# Patient Record
Sex: FEMALE | Race: BLACK OR AFRICAN AMERICAN | Employment: OTHER | URBAN - METROPOLITAN AREA
[De-identification: names, ages, dates, MRNs, and addresses within clinical notes are randomized per-mention and may not be internally consistent; named-entity substitution may affect disease eponyms.]

---

## 2017-01-06 ENCOUNTER — OFFICE VISIT (OUTPATIENT)
Dept: INTERNAL MEDICINE CLINIC | Facility: CLINIC | Age: 62
End: 2017-01-06

## 2017-01-06 VITALS
SYSTOLIC BLOOD PRESSURE: 122 MMHG | TEMPERATURE: 98 F | HEART RATE: 84 BPM | HEIGHT: 57 IN | WEIGHT: 115.5 LBS | OXYGEN SATURATION: 99 % | DIASTOLIC BLOOD PRESSURE: 70 MMHG | BODY MASS INDEX: 24.92 KG/M2 | RESPIRATION RATE: 16 BRPM

## 2017-01-06 DIAGNOSIS — M47.26 OSTEOARTHRITIS OF SPINE WITH RADICULOPATHY, LUMBAR REGION: ICD-10-CM

## 2017-01-06 DIAGNOSIS — M51.9 LUMBAR DISC LESION: ICD-10-CM

## 2017-01-06 DIAGNOSIS — G89.29 OTHER CHRONIC PAIN: ICD-10-CM

## 2017-01-06 DIAGNOSIS — N95.2 VAGINAL ATROPHY: Primary | ICD-10-CM

## 2017-01-06 DIAGNOSIS — M35.9 UNDIFFERENTIATED CONNECTIVE TISSUE DISEASE (HCC): ICD-10-CM

## 2017-01-06 DIAGNOSIS — G47.00 INSOMNIA, UNSPECIFIED TYPE: ICD-10-CM

## 2017-01-06 DIAGNOSIS — M75.101 ROTATOR CUFF SYNDROME OF RIGHT SHOULDER: ICD-10-CM

## 2017-01-06 PROCEDURE — 99214 OFFICE O/P EST MOD 30 MIN: CPT | Performed by: INTERNAL MEDICINE

## 2017-01-06 NOTE — PROGRESS NOTES
Yamilet Fagan is a 62year old female. HPI:   Patient presents for multiple issues.   1. Chronic Neck and low back pain: underwent b/l facet joint pain injections by Dr. Saul Hernandez in 12/2016 and per him she had \"excellent response\" but patient states he Nausea and vomiting    Family History   Problem Relation Age of Onset   • atherosclerosis, PTCA, pacemaker[other] [OTHER] Mother    • Hypertension Mother    • Heart Disease Mother    • Unknown[other] [OTHER] Father    • Anemia, MI, alcoholic cirrhosis[othe atraumatic, MMM, throat is clear  NECK: supple, no jvd, no thyromegaly, old sugical scar down midline but no swelling apparent. Her flexion and extension is limited to 45 degrees.  Her lateral flexion is normal but she has pain with any movement  LUNGS: gayathri injections or ablations as he has recommended. Reminded to undergo CT spine to further evaluate L4 cyst.   # Recurrent BV, Dyspareunia, Vaginal Dryness: very well controlled with vaginal premarin 2 times/week.  Last episode of BV in 9/2016 but she was not u

## 2017-01-09 ENCOUNTER — TELEPHONE (OUTPATIENT)
Dept: NEUROLOGY | Facility: CLINIC | Age: 62
End: 2017-01-09

## 2017-01-09 NOTE — TELEPHONE ENCOUNTER
Pt is scheduled,CT Lumbar ordered by Dr. Branch Later has been approved by your insurance, authorization # [id number] and is approved through [id number]. Please have procedure completed before 2.23.17.  Schedule at your convenience with the central scheduling d

## 2017-01-10 ENCOUNTER — TELEPHONE (OUTPATIENT)
Dept: SURGERY | Facility: CLINIC | Age: 62
End: 2017-01-10

## 2017-01-10 NOTE — TELEPHONE ENCOUNTER
Spoke w/ pt regarding current pain. Pt c/o pain to RT lower back (tumor) and top of RT neck (swollen and using ice since last week); pain increases when turning neck to RT.   States pain radiates down RT shoulder (rotator cuff tear), and has numbness/tingl

## 2017-01-11 RX ORDER — ACETAMINOPHEN AND CODEINE PHOSPHATE 300; 30 MG/1; MG/1
1 TABLET ORAL EVERY 8 HOURS PRN
Qty: 90 TABLET | Refills: 0 | Status: SHIPPED | OUTPATIENT
Start: 2017-01-11 | End: 2017-02-14

## 2017-01-11 NOTE — TELEPHONE ENCOUNTER
i will place her on tylenol #3 for TID dosing. She should not take during the day if it's making her sleepy.  [place]

## 2017-01-11 NOTE — TELEPHONE ENCOUNTER
Spoke with patient and informed patient of message below. Pt verbalized understanding. No further questions at this time.

## 2017-01-13 ENCOUNTER — HOSPITAL ENCOUNTER (OUTPATIENT)
Dept: CT IMAGING | Facility: HOSPITAL | Age: 62
Discharge: HOME | End: 2017-01-13
Attending: NURSE PRACTITIONER
Payer: MEDICARE

## 2017-01-13 DIAGNOSIS — M54.16 LUMBAR RADICULOPATHY: ICD-10-CM

## 2017-01-13 PROCEDURE — 72131 CT LUMBAR SPINE W/O DYE: CPT

## 2017-01-17 ENCOUNTER — TELEPHONE (OUTPATIENT)
Dept: SURGERY | Facility: CLINIC | Age: 62
End: 2017-01-17

## 2017-01-17 NOTE — TELEPHONE ENCOUNTER
Spoke with patient and informed patient of message below. Pt verbalized understanding, but had questions regarding the RT side where tumor was seen by pt and Dr. Sang Marc from MRI at [facility], prior to CT scan. Pt indicated CT was ordered for further investigation.

## 2017-01-17 NOTE — TELEPHONE ENCOUNTER
----- Message from CONCEPCION Finley sent at 1/17/2017  8:39 AM CST -----  Arthritic changes and also bulging discs are noted on CT of lumbar spine. Proceed with scheduled left TLESIs.

## 2017-01-18 NOTE — TELEPHONE ENCOUNTER
Spoke with pt and informed her that RN will talk to Dr. Concetta Han today in regards to CT results. Pt was thankful, no further questions.

## 2017-01-18 NOTE — TELEPHONE ENCOUNTER
Spoke to patient and advised of provider message below. Patient also with questions regarding upcoming injection dates. Reviewed dates with patient, also sent pre-procedural instructions to [employer] account. No further needs at this time.

## 2017-01-18 NOTE — TELEPHONE ENCOUNTER
Would you please ask the radiologist to comment on the CT scan for the area at L4-5, cyst/growth, noted on MRI. Thanks!

## 2017-01-18 NOTE — TELEPHONE ENCOUNTER
Please let her know that the radiologist commented that it was a benign-appearing growth, Dr. Ceasar Smith feels it is likely a cyst. No concerns about this area at this time. He recommends that she continue with left TLESIs that are scheduled.    Thanks

## 2017-01-23 ENCOUNTER — TELEPHONE (OUTPATIENT)
Dept: SURGERY | Facility: CLINIC | Age: 62
End: 2017-01-23

## 2017-01-23 NOTE — TELEPHONE ENCOUNTER
Spoke to Mu Pat at [employer], [address] ([phone]) is valid and billable, no prior authorization or predetermination required.  Call reference #Tiffanie W 10:20am on 1/23/17, time on call: 16:03

## 2017-01-26 ENCOUNTER — TELEPHONE (OUTPATIENT)
Dept: SURGERY | Facility: CLINIC | Age: 62
End: 2017-01-26

## 2017-01-26 DIAGNOSIS — M54.16 LUMBAR RADICULOPATHY: Primary | ICD-10-CM

## 2017-01-26 NOTE — TELEPHONE ENCOUNTER
Patient did not show for scheduled injection today. Would you cancel her injection and contact her to see if she would like to reschedule?   Thanks

## 2017-01-26 NOTE — TELEPHONE ENCOUNTER
Spoke w/ pt this morning. Pt states she forgot about her injection d/t pain. Pt rescheduled for 3/2/17 @ 10:30am arriving at 9:30am.  Reviewed upcoming procedure dates and arrival times. Pt able to teach back dates and arrival times.   Pt verbalized unde ? Xarelto (Rivaroxaban) 3 days  ? Lovenox (Enoxaparin) 24 hours  ? Aspirin  ? 81mg day of procedure  ? Greater than 81 mg (325mg) 7 days  ? Coumadin Procedure may be cancelled if INR is elevated. ? Epidural ____ - 7 days  ? Others 5 days  ?  Excedrin (wit It is normal to have increased pain at injection site for up to 3-5 days after procedure, you can use heat for the first 24 hours (20 minutes on 20 minutes off) after the first 24 hours you can use heat or cold.          Epidural Injections  What is an epid No, this procedure is done with a local anesthetic. Some patients choose to receive intravenous sedation that can make the procedure easier to tolerate.  This type of sedation may cause amnesia and patients may not remember parts or all of the actual proced Most patients do not receive more than three injections in a six-month period. This is because the effects of the medication injected frequently last for six months or more.  If three injections have not helped you very much, you may be a candidate for a di

## 2017-02-02 ENCOUNTER — SURGERY (OUTPATIENT)
Age: 62
End: 2017-02-02

## 2017-02-02 ENCOUNTER — APPOINTMENT (OUTPATIENT)
Dept: GENERAL RADIOLOGY | Facility: HOSPITAL | Age: 62
End: 2017-02-02
Attending: ANESTHESIOLOGY
Payer: MEDICARE

## 2017-02-07 ENCOUNTER — TELEPHONE (OUTPATIENT)
Dept: INTERNAL MEDICINE CLINIC | Facility: CLINIC | Age: 62
End: 2017-02-07

## 2017-02-07 NOTE — TELEPHONE ENCOUNTER
Pt has new insurance for 2017 needs multiple referrals. Please call pt at [phone] can leave detailed msg if no answer      [facility]in [facility] [address] [facility],      PAIN MANAGEMENT Beau Rios.  Milly Aranda MD

## 2017-02-09 ENCOUNTER — SURGERY (OUTPATIENT)
Age: 62
End: 2017-02-09

## 2017-02-09 ENCOUNTER — APPOINTMENT (OUTPATIENT)
Dept: GENERAL RADIOLOGY | Facility: HOSPITAL | Age: 62
End: 2017-02-09
Attending: ANESTHESIOLOGY
Payer: MEDICARE

## 2017-02-14 ENCOUNTER — APPOINTMENT (OUTPATIENT)
Dept: LAB | Age: 62
End: 2017-02-14
Attending: INTERNAL MEDICINE
Payer: MEDICARE

## 2017-02-14 ENCOUNTER — OFFICE VISIT (OUTPATIENT)
Dept: INTERNAL MEDICINE CLINIC | Facility: CLINIC | Age: 62
End: 2017-02-14

## 2017-02-14 VITALS
OXYGEN SATURATION: 98 % | TEMPERATURE: 98 F | SYSTOLIC BLOOD PRESSURE: 118 MMHG | HEART RATE: 78 BPM | RESPIRATION RATE: 18 BRPM | BODY MASS INDEX: 24.59 KG/M2 | DIASTOLIC BLOOD PRESSURE: 70 MMHG | HEIGHT: 57 IN | WEIGHT: 114 LBS

## 2017-02-14 DIAGNOSIS — Z12.31 VISIT FOR SCREENING MAMMOGRAM: ICD-10-CM

## 2017-02-14 DIAGNOSIS — Z87.891 HISTORY OF TOBACCO ABUSE: ICD-10-CM

## 2017-02-14 DIAGNOSIS — Z00.00 ENCOUNTER FOR ANNUAL HEALTH EXAMINATION: ICD-10-CM

## 2017-02-14 DIAGNOSIS — Z00.00 ENCOUNTER FOR ANNUAL HEALTH EXAMINATION: Primary | ICD-10-CM

## 2017-02-14 DIAGNOSIS — N95.2 VAGINAL ATROPHY: ICD-10-CM

## 2017-02-14 DIAGNOSIS — G47.00 INSOMNIA, UNSPECIFIED TYPE: ICD-10-CM

## 2017-02-14 DIAGNOSIS — M35.9 UNDIFFERENTIATED CONNECTIVE TISSUE DISEASE (HCC): ICD-10-CM

## 2017-02-14 DIAGNOSIS — M47.26 OSTEOARTHRITIS OF SPINE WITH RADICULOPATHY, LUMBAR REGION: ICD-10-CM

## 2017-02-14 DIAGNOSIS — M48.02 SPINAL STENOSIS IN CERVICAL REGION: ICD-10-CM

## 2017-02-14 DIAGNOSIS — Z98.1 S/P CERVICAL SPINAL FUSION: ICD-10-CM

## 2017-02-14 LAB
ALT SERPL-CCNC: 22 U/L (ref 14–54)
AST SERPL-CCNC: 19 U/L (ref 15–41)
BUN BLD-MCNC: 11 MG/DL (ref 8–20)
CALCIUM BLD-MCNC: 8.8 MG/DL (ref 8.3–10.3)
CHLORIDE: 106 MMOL/L (ref 101–111)
CHOLEST SMN-MCNC: 222 MG/DL (ref ?–200)
CO2: 29 MMOL/L (ref 22–32)
CREAT BLD-MCNC: 0.9 MG/DL (ref 0.55–1.02)
EST. AVERAGE GLUCOSE BLD GHB EST-MCNC: 120 MG/DL (ref 68–126)
GLUCOSE BLD-MCNC: 78 MG/DL (ref 70–99)
HBA1C MFR BLD HPLC: 5.8 % (ref ?–5.7)
HDLC SERPL-MCNC: 81 MG/DL (ref 45–?)
HDLC SERPL: 2.74 {RATIO} (ref ?–4.44)
LDLC SERPL CALC-MCNC: 128 MG/DL (ref ?–130)
NONHDLC SERPL-MCNC: 141 MG/DL (ref ?–130)
POTASSIUM SERPL-SCNC: 4.8 MMOL/L (ref 3.6–5.1)
SODIUM SERPL-SCNC: 141 MMOL/L (ref 136–144)
TRIGLYCERIDES: 63 MG/DL (ref ?–150)
VLDL: 13 MG/DL (ref 5–40)

## 2017-02-14 PROCEDURE — 84460 ALANINE AMINO (ALT) (SGPT): CPT | Performed by: INTERNAL MEDICINE

## 2017-02-14 PROCEDURE — 99396 PREV VISIT EST AGE 40-64: CPT | Performed by: INTERNAL MEDICINE

## 2017-02-14 PROCEDURE — 36415 COLL VENOUS BLD VENIPUNCTURE: CPT | Performed by: INTERNAL MEDICINE

## 2017-02-14 PROCEDURE — 83036 HEMOGLOBIN GLYCOSYLATED A1C: CPT | Performed by: INTERNAL MEDICINE

## 2017-02-14 PROCEDURE — G0439 PPPS, SUBSEQ VISIT: HCPCS | Performed by: INTERNAL MEDICINE

## 2017-02-14 PROCEDURE — 80048 BASIC METABOLIC PNL TOTAL CA: CPT | Performed by: INTERNAL MEDICINE

## 2017-02-14 PROCEDURE — 96160 PT-FOCUSED HLTH RISK ASSMT: CPT | Performed by: INTERNAL MEDICINE

## 2017-02-14 PROCEDURE — 84450 TRANSFERASE (AST) (SGOT): CPT | Performed by: INTERNAL MEDICINE

## 2017-02-14 PROCEDURE — 80061 LIPID PANEL: CPT | Performed by: INTERNAL MEDICINE

## 2017-02-14 RX ORDER — TRAZODONE HYDROCHLORIDE 50 MG/1
50 TABLET ORAL NIGHTLY
Qty: 30 TABLET | Refills: 1 | Status: SHIPPED | OUTPATIENT
Start: 2017-02-14 | End: 2017-03-24

## 2017-02-14 NOTE — PATIENT INSTRUCTIONS
For your insomnia, please try trazodone 50 mg nightly for 3 nights. If you do not feel drowsy on this dose, then increase to 100 mg nightly. You need 3 eight ounce servings of calcium/vitamin D daily.  This includes spinach, kale, broccoli, almonds, milk

## 2017-02-14 NOTE — PROGRESS NOTES
HPI:   Harris Denis is a 62year old female who presents for a medicare wellness exam and additional issues noted below. 1. Chronic Pain: tylenol #4 is not helping so she stopped.    2. Cervical and lumbar injections on 2/9 and 2/13 have not helped Prescriptions Marked as Taking for the 2/14/17 encounter (Office Visit) with Cb Wood MD:  Estrogens, Conjugated (PREMARIN) 0.625 MG/GM Vaginal Cream Place 0.5 g vaginally See Admin Instructions.  2 times/week   RESTASIS 0.05 % Ophthalmic Emulsion Plac not use laxatives. Has used miralax in past but did not help.    : denies dysuria or hematuria, vaginal discharge or itching or bleeding, no complaint of urinary incontinence   MUSCULOSKELETAL: chronic pain  NEURO: has headaches 2/2 neck pain 2 times/week 12/21/2015, 09/15/2016, 01/27/2017   • Influenza 09/13/2010, 11/16/2011, 11/07/2012, 10/25/2013   • Kenalog Per 10mg, Bursa/Joint Inj 03/31/2015   • TDAP 10/07/2014       ASSESSMENT AND OTHER RELEVANT CHRONIC CONDITIONS:   Christina Pereira is a 62year old Hearing Problems?: No    Vision Problems? : Yes    Difficulty walking?: Yes    Difficulty dressing or bathing?: No    Problems with daily activities? : Yes    Memory Problems?: Yes      Fall/Risk Assessment     Do you have 3 or more medical conditions?: Date Value   03/25/2016 124     LDL-CHOLESTEROL (mg/dL (calc))   Date Value   04/26/2012 169*     LDL CHOLESTROL (mg/dL)   Date Value   12/24/2013 121        EKG - w/ Initial Preventative Physical Exam only, or if medically necessary Electrocardiogram da (Prevnar) No orders found for this or any previous visit. Pneumococcal 23 (Pneumovax) No orders found for this or any previous visit. Hepatitis B No orders found for this or any previous visit.      Tetanus   Orders placed or performed in visit on sleep hygiene and ambien have not helped.  Treating the underlying issue (pain) is the ultimate solution.    # Cervical stenosis, disc herniation with radiculopathy and history of cervical spine tumor, cervical kyphosis: s/p L C3-6 ACDF by Dr. Bhakti Puri i Osteopenia: she did not tolerate alendronate 2/2 GERD so has not taken since 1/2014. DEXA 3/2015 and FRAX score is low risk.  Thus, at this time she does not need pharmacologic therapy. Cont counseling on calcium/vit D and exercise.  Per Dr. Noa Go, she wo patient indicates understanding of these issues and agrees to the plan.    The patient is asked to return in 6-12 weeks for evaluation of insomnia.    Al Chiu MD

## 2017-02-16 ENCOUNTER — TELEPHONE (OUTPATIENT)
Dept: SURGERY | Facility: CLINIC | Age: 62
End: 2017-02-16

## 2017-02-16 NOTE — TELEPHONE ENCOUNTER
Spoke with pt who was under the impression that she had a scheduled injection today. Pt informed her that injection is scheduled for 3/2/2017. Pt transferred to [facility] to scheduled follow up.

## 2017-02-21 ENCOUNTER — HOSPITAL ENCOUNTER (OUTPATIENT)
Dept: MAMMOGRAPHY | Age: 62
Discharge: HOME | End: 2017-02-21
Attending: INTERNAL MEDICINE
Payer: MEDICARE

## 2017-02-21 DIAGNOSIS — Z12.31 VISIT FOR SCREENING MAMMOGRAM: ICD-10-CM

## 2017-02-21 PROCEDURE — 77067 SCR MAMMO BI INCL CAD: CPT

## 2017-02-27 ENCOUNTER — TELEPHONE (OUTPATIENT)
Dept: NEUROLOGY | Facility: CLINIC | Age: 62
End: 2017-02-27

## 2017-03-24 ENCOUNTER — MED REC SCAN ONLY (OUTPATIENT)
Dept: SURGERY | Facility: CLINIC | Age: 62
End: 2017-03-24

## 2017-03-24 ENCOUNTER — APPOINTMENT (OUTPATIENT)
Dept: LAB | Age: 62
End: 2017-03-24
Attending: ANESTHESIOLOGY
Payer: MEDICARE

## 2017-03-24 DIAGNOSIS — F11.90 NARCOTIC DRUG USE: ICD-10-CM

## 2017-03-24 PROCEDURE — 80307 DRUG TEST PRSMV CHEM ANLYZR: CPT

## 2017-03-24 NOTE — PATIENT INSTRUCTIONS
Refill policies:    • Allow 2 business days for refills; controlled substances may take longer.   • Contact your pharmacy at least 5 days prior to running out of medication and have them send an electronic request or submit request through the “request re insurance carrier to obtain pre-certification or prior authorization. Unfortunately, [facility] has seen an increase in denial of payment even though the procedure/test has been pre-certified.   You are strongly encouraged to contact your insurance carrier to v

## 2017-03-26 LAB
6-ACETYLMORHINE CUTOFF 20 NG/ML: NOT DETECTED
7-AMINOCLONAZEPAM 40 NG/ML: NOT DETECTED
A-OH-ALPRAZOLM CUTOFF 20 NG/ML: NOT DETECTED
ALPRAZOLAM CUTOFF 40 NG/ML: NOT DETECTED
AMPHETAMINE CUTOFF 10 NG/ML: NOT DETECTED
BARBITURATES CUTOFF 200 NG/ML: NOT DETECTED
BENZOYLECGONINE  150 NG/ML: NOT DETECTED
BUPRENORPHINE CUTOFF 5 NG/ML: NOT DETECTED
CARISOPRODOL CUTOFF 100 NG/ML: NOT DETECTED
CODINE CUTOFF 40 NG/ML: PRESENT
COLNAZEPAM CUTOFF 20 NG/ML: NOT DETECTED
CREATININE,URINE: 51.3 MG/DL
DIAZEPAM CUTOFF 50 NG/ML: NOT DETECTED
ETHYL-GLUCURONIDE 500 NG/ML: NOT DETECTED
FENTANYL CUTOFF 2 NG/ML: NOT DETECTED
HYDROCODONE CUTOFF 40 NG/ML: NOT DETECTED
HYDROMORPHONE CUTOFF 40 NG/ML: NOT DETECTED
LORAZEPAM CUTOFF 60 NG/ML: NOT DETECTED
MARIJUANA CUTOFF 20 NG/ML: NOT DETECTED
MDA CUTOFF 200 NG/ML: NOT DETECTED
MDEA EVE CUTOFF 200 NG/ML: NOT DETECTED
MDMA-ECSTASY CUTOFF 200 NG/ML: NOT DETECTED
MEPERIDINE MET CUTOFF 50 NG/ML: NOT DETECTED
METHADONE CUTOFF 150 NG/ML: NOT DETECTED
METHAMPHETMN CUTOFF 400 NG/ML: NOT DETECTED
METHYLPHENIDATE (CUTOFF 100 NG/ML): NOT DETECTED
MIDAZOLAM CUTOFF 20 NG/ML: NOT DETECTED
MORHINE CUTOFF 20 NG/ML: PRESENT
NORBUPRENORPHINE  20 NG/ML: NOT DETECTED
NORDIAZEPAM CUTOFF 50 NG/ML: NOT DETECTED
NORFENTANYL CUTOFF 2 NG/ML: NOT DETECTED
NORHYDRCODONE CUTOFF 100 NG/ML: NOT DETECTED
NOROXYCODONE CUTOFF 100 NG/ML: NOT DETECTED
NOROXYMORPHNE CUTOFF 100 NG/ML: NOT DETECTED
OXAZEPAM CUTOFF 50 NG/ML: NOT DETECTED
OXYCODONE CUTOFF 40 NG/ML: NOT DETECTED
OXYMORPHONE CUTOFF 40 NG/ML: NOT DETECTED
PCP CUTOFF 25 NG/ML: NOT DETECTED
PHENTERMINE CUTOFF 100 NG/ML: NOT DETECTED
PROPOXYPHENE CUTOFF 300 NG/ML: NOT DETECTED
TAPENTADOL CUTOFF 100 NG/ML: NOT DETECTED
TAPENTADOL-O SULF 200 NG/ML: NOT DETECTED
TEMAZEPAM CUTOFF 50 NG/ML: NOT DETECTED
TRAMADOL CUTOFF 200 NG/ML: NOT DETECTED
ZOLPIDEM CUTOFF 20 NG/ML: NOT DETECTED

## 2017-03-26 NOTE — PROGRESS NOTES
Name: Izabel Blunt   : 1955   DOS: 3/24/2017     Pain Clinic Follow Up Visit:   Izabel Blunt is a 62year old female who presents for recheck of her chronic neck and low back pain.   She is status post bilateral diagnostic cervical facet join spine makes the pain worse. Straight leg raising test is positive on the left side at 15° and negative on the right side. Motor 5 out of 5, sensory is intact and reflexes 2+. Dorsalis pedis is palpable bilaterally.  Heel walking and toe walking is normal.

## 2017-03-28 ENCOUNTER — TELEPHONE (OUTPATIENT)
Dept: SURGERY | Facility: CLINIC | Age: 62
End: 2017-03-28

## 2017-03-28 NOTE — TELEPHONE ENCOUNTER
----- Message from CONCEPCION Ochoa sent at 3/27/2017 10:58 AM CDT -----  She is taking codeine and morphine, which we are not prescribing for her. Please let her know that we will no longer be able to prescribe Tramadol for her.

## 2017-03-28 NOTE — TELEPHONE ENCOUNTER
Patient informed of message below. Patient stated she better start watching what she's eating. Patient understood that we will not be prescribing Tramadol for her.

## 2017-04-23 ENCOUNTER — HOSPITAL ENCOUNTER (OUTPATIENT)
Age: 62
Discharge: HOME | End: 2017-04-23
Payer: MEDICARE

## 2017-04-23 VITALS
HEART RATE: 84 BPM | RESPIRATION RATE: 16 BRPM | OXYGEN SATURATION: 100 % | TEMPERATURE: 98 F | DIASTOLIC BLOOD PRESSURE: 85 MMHG | SYSTOLIC BLOOD PRESSURE: 154 MMHG

## 2017-04-23 DIAGNOSIS — N30.00 ACUTE CYSTITIS WITHOUT HEMATURIA: Primary | ICD-10-CM

## 2017-04-23 PROCEDURE — 87186 SC STD MICRODIL/AGAR DIL: CPT | Performed by: PHYSICIAN ASSISTANT

## 2017-04-23 PROCEDURE — 81002 URINALYSIS NONAUTO W/O SCOPE: CPT | Performed by: PHYSICIAN ASSISTANT

## 2017-04-23 PROCEDURE — 99214 OFFICE O/P EST MOD 30 MIN: CPT

## 2017-04-23 PROCEDURE — 87086 URINE CULTURE/COLONY COUNT: CPT | Performed by: PHYSICIAN ASSISTANT

## 2017-04-23 PROCEDURE — 99204 OFFICE O/P NEW MOD 45 MIN: CPT

## 2017-04-23 PROCEDURE — 87088 URINE BACTERIA CULTURE: CPT | Performed by: PHYSICIAN ASSISTANT

## 2017-04-23 RX ORDER — SULFAMETHOXAZOLE AND TRIMETHOPRIM 800; 160 MG/1; MG/1
1 TABLET ORAL 2 TIMES DAILY
Qty: 6 TABLET | Refills: 0 | Status: SHIPPED | OUTPATIENT
Start: 2017-04-23 | End: 2017-04-26

## 2017-04-23 RX ORDER — PHENAZOPYRIDINE HYDROCHLORIDE 200 MG/1
200 TABLET, FILM COATED ORAL 3 TIMES DAILY PRN
Qty: 6 TABLET | Refills: 0 | Status: SHIPPED | OUTPATIENT
Start: 2017-04-23 | End: 2017-04-30

## 2017-04-23 NOTE — ED PROVIDER NOTES
Patient Seen in: THE [facility] OF [facility] Immediate Care In [address] [place] [address],[address]    History   Patient presents with:  Dysuria    Stated Complaint: UTI    HPI    CHIEF COMPLAINT: UTI symptoms    HISTORY OF PRESENT ILLNESS: Patient is a 62-year-old female presents to the immediate c Intraspinal tumor    HYSTERECTOMY  1985    Comment RUKHSANA and BSO, fibroids, 1987    OTHER SURGICAL HISTORY  2006    OTHER SURGICAL HISTORY  1/2008    Comment R toe artificial cartilage, straighten out 4th toe    BACK SURGERY  2007     Comment cer tumors sanjeev None (Room air)       Current:/85 mmHg  Pulse 84  Temp(Src) 98 °F (36.7 °C) (Temporal)  Resp 16  SpO2 100%        Physical Exam    Nursing notes and vital signs reviewed     General Appearance: alert and oriented x 4, no acute distress  Respiratory: [id number]  [phone]    Schedule an appointment as soon as possible for a visit in 5 days  For reevaluation      Medications Prescribed:  Current Discharge Medication List    START taking these medications    Sulfamethoxazole-TMP -160 MG Oral Tab

## 2017-04-24 NOTE — ED NOTES
Preliminary urine culture positive E-coli. Patient treated with Bactrim awaiting final culture sensitivity.

## 2017-04-28 ENCOUNTER — OFFICE VISIT (OUTPATIENT)
Dept: INTERNAL MEDICINE CLINIC | Facility: CLINIC | Age: 62
End: 2017-04-28

## 2017-04-28 VITALS
WEIGHT: 119.5 LBS | BODY MASS INDEX: 25 KG/M2 | RESPIRATION RATE: 16 BRPM | DIASTOLIC BLOOD PRESSURE: 78 MMHG | TEMPERATURE: 98 F | HEART RATE: 88 BPM | SYSTOLIC BLOOD PRESSURE: 144 MMHG

## 2017-04-28 DIAGNOSIS — N39.0 URINARY TRACT INFECTION WITHOUT HEMATURIA, SITE UNSPECIFIED: Primary | ICD-10-CM

## 2017-04-28 PROCEDURE — 99213 OFFICE O/P EST LOW 20 MIN: CPT | Performed by: INTERNAL MEDICINE

## 2017-04-28 PROCEDURE — 81003 URINALYSIS AUTO W/O SCOPE: CPT | Performed by: INTERNAL MEDICINE

## 2017-04-28 RX ORDER — SULFAMETHOXAZOLE AND TRIMETHOPRIM 800; 160 MG/1; MG/1
1 TABLET ORAL 2 TIMES DAILY
Qty: 10 TABLET | Refills: 0 | Status: SHIPPED | OUTPATIENT
Start: 2017-04-28 | End: 2017-05-03

## 2017-04-28 RX ORDER — FLUCONAZOLE 150 MG/1
150 TABLET ORAL ONCE
Qty: 1 TABLET | Refills: 0 | Status: SHIPPED | OUTPATIENT
Start: 2017-04-28 | End: 2017-04-28

## 2017-04-28 NOTE — PATIENT INSTRUCTIONS
- We will do another course of antibiotics (Bactrim). Take 1 tablet twice daily for the next 5 days.   I have also prescribed a Diflucan tablet that you can take if you have symptoms of a yeast infection.    - Let us know if your symptoms are not improved

## 2017-04-28 NOTE — PROGRESS NOTES
Kirtland Sacks is a 62year old female. HPI:   Patient presents with:  Urgent Care F/u: 4/23/17 dt UTI  Patient presents for follow up on urinary issues. She was seen at the immediate care on 4/23, was diagnosed with a UTI.   Urine culture did grow p Depression; Osteoarthrosis, unspecified whether generalized or localized, unspecified site; Osteopenia; Undifferentiated connective tissue disease ([facility] [address] Dyspareunia; Osteoporosis; and H/O mammogram (4-10-15).   Surgical:  has past surgical history that inc Coli. Will do 5 more days of Bactrim DS.       Patient Care Team:  Paulina Stewart MD as PCP - General (Internal Medicine)  Angela Avila MD ([facility])  Art Ann MD (Anesthesiology)  CONCEPCION Haque (Nurse Practitioner)  Brenna Esquivel

## 2017-05-22 RX ORDER — IBUPROFEN 800 MG/1
TABLET ORAL
Qty: 90 TABLET | Refills: 1 | Status: SHIPPED | OUTPATIENT
Start: 2017-05-22 | End: 2017-05-22

## 2017-05-22 RX ORDER — IBUPROFEN 800 MG/1
TABLET ORAL
Qty: 30 TABLET | Refills: 0 | Status: SHIPPED | OUTPATIENT
Start: 2017-05-22 | End: 2017-08-01

## 2017-06-16 ENCOUNTER — OFFICE VISIT (OUTPATIENT)
Dept: INTERNAL MEDICINE CLINIC | Facility: CLINIC | Age: 62
End: 2017-06-16

## 2017-06-16 VITALS
SYSTOLIC BLOOD PRESSURE: 134 MMHG | HEART RATE: 72 BPM | WEIGHT: 119.5 LBS | HEIGHT: 58 IN | TEMPERATURE: 98 F | BODY MASS INDEX: 25.08 KG/M2 | DIASTOLIC BLOOD PRESSURE: 76 MMHG | RESPIRATION RATE: 17 BRPM

## 2017-06-16 DIAGNOSIS — M47.26 OSTEOARTHRITIS OF SPINE WITH RADICULOPATHY, LUMBAR REGION: ICD-10-CM

## 2017-06-16 DIAGNOSIS — R35.0 URINARY FREQUENCY: Primary | ICD-10-CM

## 2017-06-16 DIAGNOSIS — M79.671 RIGHT FOOT PAIN: ICD-10-CM

## 2017-06-16 DIAGNOSIS — M50.30 DEGENERATIVE DISC DISEASE, CERVICAL: ICD-10-CM

## 2017-06-16 DIAGNOSIS — G47.00 INSOMNIA, UNSPECIFIED TYPE: ICD-10-CM

## 2017-06-16 DIAGNOSIS — Z98.1 S/P CERVICAL SPINAL FUSION: ICD-10-CM

## 2017-06-16 DIAGNOSIS — M48.02 SPINAL STENOSIS IN CERVICAL REGION: ICD-10-CM

## 2017-06-16 DIAGNOSIS — G89.29 OTHER CHRONIC PAIN: ICD-10-CM

## 2017-06-16 PROCEDURE — 99214 OFFICE O/P EST MOD 30 MIN: CPT | Performed by: INTERNAL MEDICINE

## 2017-06-16 PROCEDURE — 81003 URINALYSIS AUTO W/O SCOPE: CPT | Performed by: INTERNAL MEDICINE

## 2017-06-16 PROCEDURE — 87086 URINE CULTURE/COLONY COUNT: CPT | Performed by: INTERNAL MEDICINE

## 2017-06-16 NOTE — PATIENT INSTRUCTIONS
For your insomnia, please start with trazodone 25 mg nightly for 3 nights and then increase to 50 mg if you tolerate it well.

## 2017-06-16 NOTE — PROGRESS NOTES
Tawnya Cordero is a 62year old female. HPI:   Patient presents for the followin. Lumbar Radiculopathy, Cervical Radiculitis: she is very upset because she states Dr. Blanchard Awksuzi made her feel like a drug addict.  She underwent LESI in low back and neck chronic pain. No anxiety. She gets very frustrated about her pain. No SI/HI.    EXT: denies edema     Wt Readings from Last 6 Encounters:  06/16/17 : 119 lb 8 oz  04/28/17 : 119 lb 8 oz  03/24/17 : 116 lb  02/14/17 : 114 lb  02/02/17 : 114 lb  01/06/17 : 11 in 2003    Alcohol Use: No                    EXAM:   /76 mmHg  Pulse 72  Temp(Src) 97.7 °F (36.5 °C) (Oral)  Resp 17  Ht 58\"  Wt 119 lb 8 oz  BMI 24.98 kg/m2  GENERAL: A&O well developed, well nourished,in no apparent distress  SKIN: no rashes, no interventions anymore but no medications have provided relief. Very challenging situation.    # Mood Disorder: never took fluoxetine. Cont to decline SSRI or counseling. Treating her underlying pain is main solution. I am hopeful that trazodone may help. signs or symptoms of lung cancer, cigarette smoking history is 32 years and quit 14 year ago. Vaccines: TDAP 2014, up to date with flu shot. Shingles vaccine advised.    Healthcare Living Will, Medical POA: does not have one, counseled on writing; resourc

## 2017-07-13 ENCOUNTER — OFFICE VISIT (OUTPATIENT)
Dept: INTERNAL MEDICINE CLINIC | Facility: CLINIC | Age: 62
End: 2017-07-13

## 2017-07-13 VITALS
WEIGHT: 119 LBS | SYSTOLIC BLOOD PRESSURE: 122 MMHG | BODY MASS INDEX: 24.98 KG/M2 | HEIGHT: 58 IN | HEART RATE: 72 BPM | DIASTOLIC BLOOD PRESSURE: 70 MMHG | TEMPERATURE: 98 F | RESPIRATION RATE: 16 BRPM

## 2017-07-13 DIAGNOSIS — R30.0 DYSURIA: ICD-10-CM

## 2017-07-13 DIAGNOSIS — N89.8 VAGINAL DISCHARGE: Primary | ICD-10-CM

## 2017-07-13 PROCEDURE — 87480 CANDIDA DNA DIR PROBE: CPT | Performed by: INTERNAL MEDICINE

## 2017-07-13 PROCEDURE — 87660 TRICHOMONAS VAGIN DIR PROBE: CPT | Performed by: INTERNAL MEDICINE

## 2017-07-13 PROCEDURE — 87510 GARDNER VAG DNA DIR PROBE: CPT | Performed by: INTERNAL MEDICINE

## 2017-07-13 PROCEDURE — 99214 OFFICE O/P EST MOD 30 MIN: CPT | Performed by: INTERNAL MEDICINE

## 2017-07-13 RX ORDER — METRONIDAZOLE 500 MG/1
500 TABLET ORAL 2 TIMES DAILY
Qty: 14 TABLET | Refills: 0 | Status: SHIPPED | OUTPATIENT
Start: 2017-07-13 | End: 2017-07-20

## 2017-07-13 RX ORDER — FLUCONAZOLE 150 MG/1
150 TABLET ORAL ONCE
Qty: 1 TABLET | Refills: 0 | Status: SHIPPED | OUTPATIENT
Start: 2017-07-13 | End: 2017-07-13

## 2017-07-13 NOTE — PATIENT INSTRUCTIONS
- start antibiotic (metronidazole). Take 1 tablet twice daily for the next week  - If you develop a yeast infection, take 1 tablet of diflucan  - We will contact you with your specimen results.   If everything is negative, and you are still having symptoms

## 2017-07-13 NOTE — PROGRESS NOTES
Rosalee Johnson is a 62year old female. HPI:   Patient presents with:  Vaginal Problem: Pt has been expercincing some vaginal odor for the past week think she has a infection   Patient presents with acute complaint of vaginal odor.   This has been goi Spinal cord tumor; and Undifferentiated connective tissue disease ([facility] [address]   Surgical:  has a past surgical history that includes colonoscopy (2010); revise median n/carpal tunnel surg (2000); repair rotator cuff,chronic ([id number]); surgery - external (Septe empirically on metronidazole, 500 mg BID x 7 days. Fluconazole 150 mg once prescribed as well - patient often gets yeast infections with antibiotic treatments. 2.  Polyuria  Possible related to vaginal symptoms.  She has had urinary symptoms several t

## 2017-07-14 LAB
C TRACH DNA SPEC QL NAA+PROBE: NEGATIVE
N GONORRHOEA DNA SPEC QL NAA+PROBE: NEGATIVE

## 2017-08-01 RX ORDER — IBUPROFEN 800 MG/1
TABLET ORAL
Qty: 30 TABLET | Refills: 0 | Status: SHIPPED | OUTPATIENT
Start: 2017-08-01 | End: 2017-09-09

## 2017-09-09 ENCOUNTER — TELEPHONE (OUTPATIENT)
Dept: INTERNAL MEDICINE CLINIC | Facility: CLINIC | Age: 62
End: 2017-09-09

## 2017-09-09 ENCOUNTER — MOBILE ENCOUNTER (OUTPATIENT)
Dept: INTERNAL MEDICINE CLINIC | Facility: CLINIC | Age: 62
End: 2017-09-09

## 2017-09-09 ENCOUNTER — APPOINTMENT (OUTPATIENT)
Dept: LAB | Facility: HOSPITAL | Age: 62
End: 2017-09-09
Attending: INTERNAL MEDICINE
Payer: MEDICARE

## 2017-09-09 DIAGNOSIS — R35.0 URINARY FREQUENCY: Primary | ICD-10-CM

## 2017-09-09 DIAGNOSIS — R35.0 URINARY FREQUENCY: ICD-10-CM

## 2017-09-09 LAB
BILIRUB UR QL STRIP.AUTO: NEGATIVE
GLUCOSE UR STRIP.AUTO-MCNC: NEGATIVE MG/DL
KETONES UR STRIP.AUTO-MCNC: NEGATIVE MG/DL
NITRITE UR QL STRIP.AUTO: NEGATIVE
PH UR STRIP.AUTO: 6 [PH] (ref 4.5–8)
PROT UR STRIP.AUTO-MCNC: NEGATIVE MG/DL
SP GR UR STRIP.AUTO: <1.005 (ref 1–1.03)
UROBILINOGEN UR STRIP.AUTO-MCNC: <2 MG/DL
WBC #/AREA URNS AUTO: >50 /HPF

## 2017-09-09 PROCEDURE — 87086 URINE CULTURE/COLONY COUNT: CPT

## 2017-09-09 PROCEDURE — 87186 SC STD MICRODIL/AGAR DIL: CPT

## 2017-09-09 PROCEDURE — 87077 CULTURE AEROBIC IDENTIFY: CPT

## 2017-09-09 PROCEDURE — 81001 URINALYSIS AUTO W/SCOPE: CPT

## 2017-09-09 RX ORDER — SULFAMETHOXAZOLE AND TRIMETHOPRIM 800; 160 MG/1; MG/1
1 TABLET ORAL 2 TIMES DAILY
Qty: 14 TABLET | Refills: 0 | Status: SHIPPED | OUTPATIENT
Start: 2017-09-09 | End: 2017-09-15 | Stop reason: ALTCHOICE

## 2017-09-09 RX ORDER — FLUCONAZOLE 150 MG/1
150 TABLET ORAL ONCE
Qty: 1 TABLET | Refills: 0 | Status: SHIPPED | OUTPATIENT
Start: 2017-09-09 | End: 2017-09-09

## 2017-09-10 ENCOUNTER — TELEPHONE (OUTPATIENT)
Dept: INTERNAL MEDICINE CLINIC | Facility: CLINIC | Age: 62
End: 2017-09-10

## 2017-09-10 NOTE — TELEPHONE ENCOUNTER
Patient called with 1 day of dysuria and urinary frequency. No hematuria. Mild pelvic pressure. No fevers, chills, shortness of breath, chest pain. I asked patient to complete UA and bactrim prescribed.

## 2017-09-12 RX ORDER — IBUPROFEN 800 MG/1
TABLET ORAL
Qty: 30 TABLET | Refills: 0 | Status: SHIPPED | OUTPATIENT
Start: 2017-09-12 | End: 2017-12-04

## 2017-09-15 ENCOUNTER — OFFICE VISIT (OUTPATIENT)
Dept: INTERNAL MEDICINE CLINIC | Facility: CLINIC | Age: 62
End: 2017-09-15

## 2017-09-15 VITALS
SYSTOLIC BLOOD PRESSURE: 138 MMHG | WEIGHT: 115.5 LBS | DIASTOLIC BLOOD PRESSURE: 80 MMHG | OXYGEN SATURATION: 98 % | BODY MASS INDEX: 24.24 KG/M2 | RESPIRATION RATE: 20 BRPM | HEIGHT: 58 IN | TEMPERATURE: 98 F | HEART RATE: 88 BPM

## 2017-09-15 DIAGNOSIS — N89.8 VAGINAL ITCHING: ICD-10-CM

## 2017-09-15 DIAGNOSIS — R26.81 UNSTABLE GAIT: ICD-10-CM

## 2017-09-15 DIAGNOSIS — N89.8 VAGINAL ODOR: ICD-10-CM

## 2017-09-15 DIAGNOSIS — R29.6 RECURRENT FALLS WHILE WALKING: ICD-10-CM

## 2017-09-15 DIAGNOSIS — N89.8 VAGINAL DISCHARGE: Primary | ICD-10-CM

## 2017-09-15 DIAGNOSIS — M48.02 SPINAL STENOSIS IN CERVICAL REGION: ICD-10-CM

## 2017-09-15 DIAGNOSIS — M20.41 HAMMERTOE OF RIGHT FOOT: ICD-10-CM

## 2017-09-15 DIAGNOSIS — G44.229 CHRONIC TENSION-TYPE HEADACHE, NOT INTRACTABLE: ICD-10-CM

## 2017-09-15 LAB
APPEARANCE: CLEAR
MULTISTIX LOT#: NORMAL NUMERIC
PH, URINE: 5.5 (ref 4.5–8)
SPECIFIC GRAVITY: 1.01 (ref 1–1.03)
UROBILINOGEN,SEMI-QN: 0.2 MG/DL (ref 0–1.9)

## 2017-09-15 PROCEDURE — 99214 OFFICE O/P EST MOD 30 MIN: CPT | Performed by: INTERNAL MEDICINE

## 2017-09-15 PROCEDURE — 81003 URINALYSIS AUTO W/O SCOPE: CPT | Performed by: INTERNAL MEDICINE

## 2017-09-15 PROCEDURE — 87660 TRICHOMONAS VAGIN DIR PROBE: CPT | Performed by: INTERNAL MEDICINE

## 2017-09-15 PROCEDURE — 87510 GARDNER VAG DNA DIR PROBE: CPT | Performed by: INTERNAL MEDICINE

## 2017-09-15 PROCEDURE — 87480 CANDIDA DNA DIR PROBE: CPT | Performed by: INTERNAL MEDICINE

## 2017-09-15 RX ORDER — EPINEPHRINE 0.3 MG/.3ML
0.3 INJECTION SUBCUTANEOUS ONCE
Qty: 2 EACH | Refills: 0 | Status: SHIPPED | OUTPATIENT
Start: 2017-09-15 | End: 2017-09-15

## 2017-09-15 NOTE — PROGRESS NOTES
Leodan Martinez is a 65year old female. HPI:   Patient presents for the followin. Possible peanut allergy: no issues as a child. However, last week she used peanut oil and developed tongue itching and hives.   She has needed epi shot in past from mood is stable. Sometimes she feels depression or anxious. She prefers to manage on her own. She declines counseling. Denies any SI/HI.    EXT: denies edema         Wt Readings from Last 6 Encounters:  09/15/17 : 115 lb 8 oz  07/13/17 : 119 lb  06/16/17 : 1 Packs/day: 1.00      Years: 32.00        Quit date: 3/20/2003  Smokeless tobacco: Never Used                      Comment: quit in 2003  Alcohol use:  No                    EXAM:   /80 (BP Location: Left arm, Patient Position: Sitting, Cuff Size: adul of cervical spine tumor, cervical kyphosis: s/p L C3-6 ACDF by Dr. Christy Lopez in 4/2016 to stabilize spinal cord.    - underwent b/l facet joint injections, tylenol #4 by Dr. Santo Mejia but this was not helpful.   - gabapentin, tylenol, meloxicam, voltaren, ibu Cancer Screening: 3/18/2016 colonoscopy by Dr. Jose Michael with internal hemorrhoids, repeat in 2021. Cervical Cancer Screening: s/p RUKHSANA  Breast Cancer Screening: cont yearly mammogram  Bone Health: see above.  Takes calcium/vit BID  Lung Cancer Screening: (54

## 2017-10-19 ENCOUNTER — HOSPITAL ENCOUNTER (OUTPATIENT)
Age: 62
Discharge: HOME | End: 2017-10-19
Attending: FAMILY MEDICINE
Payer: MEDICARE

## 2017-10-19 ENCOUNTER — TELEPHONE (OUTPATIENT)
Dept: INTERNAL MEDICINE CLINIC | Facility: CLINIC | Age: 62
End: 2017-10-19

## 2017-10-19 VITALS
TEMPERATURE: 98 F | SYSTOLIC BLOOD PRESSURE: 140 MMHG | HEART RATE: 87 BPM | RESPIRATION RATE: 16 BRPM | DIASTOLIC BLOOD PRESSURE: 76 MMHG

## 2017-10-19 DIAGNOSIS — N39.0 UTI (URINARY TRACT INFECTION), UNCOMPLICATED: Primary | ICD-10-CM

## 2017-10-19 PROCEDURE — 87086 URINE CULTURE/COLONY COUNT: CPT | Performed by: FAMILY MEDICINE

## 2017-10-19 PROCEDURE — 99214 OFFICE O/P EST MOD 30 MIN: CPT

## 2017-10-19 PROCEDURE — 81002 URINALYSIS NONAUTO W/O SCOPE: CPT | Performed by: FAMILY MEDICINE

## 2017-10-19 RX ORDER — NITROFURANTOIN 25; 75 MG/1; MG/1
100 CAPSULE ORAL 2 TIMES DAILY
Qty: 14 CAPSULE | Refills: 0 | Status: SHIPPED | OUTPATIENT
Start: 2017-10-19 | End: 2017-10-26

## 2017-10-19 RX ORDER — PHENAZOPYRIDINE HYDROCHLORIDE 200 MG/1
200 TABLET, FILM COATED ORAL 3 TIMES DAILY PRN
Qty: 6 TABLET | Refills: 0 | Status: SHIPPED | OUTPATIENT
Start: 2017-10-19 | End: 2017-10-21

## 2017-10-19 NOTE — TELEPHONE ENCOUNTER
Patient called stating that she has a UTI. She is unable to come in for appointment tomorrow and is wondering if a prescription can be sent to relieve the pain.  Patient said she can schedule an appointment for Monday, but still needs a prescription

## 2017-10-19 NOTE — TELEPHONE ENCOUNTER
Patient informed to walk in clinic or IC, office visit would be required for treatment, patient verbalized understanding

## 2017-10-20 NOTE — ED INITIAL ASSESSMENT (HPI)
Burning with urination - started yesterday.  Took a left over antibiotic yesterday x 1,  denies fever,blood in urine

## 2017-10-20 NOTE — ED PROVIDER NOTES
Patient Seen in: THE MEDICAL [facility] Immediate Care In [facility]    History   Patient presents with:  Urinary Symptoms (urologic)    Stated Complaint: Possible UTI    HPI    This 58-year-old female presents to the office with complaint of dysuria, urinary urgency a alcoholic cirrhosis[other] [OTHER] Other      6 siblings   • Heart Disease Maternal Grandmother    • Cancer Neg    • Stroke Neg        Smoking status: Former Smoker                                                              Packs/day: 1.00      Years: 28 limits   URINE CULTURE, ROUTINE     Urine culture from 9/9/17 shows 10,000-50,000 E. coli which was sensitive to the patient's Bactrim.   It was sensitive to all antibiotics tested.    ============================================================  ED Course your test results determines a need for a change in antibiotics. Otherwise, take all your medications until completed. See your doctor in 2-3 days if not better.

## 2017-11-20 ENCOUNTER — TELEPHONE (OUTPATIENT)
Dept: INTERNAL MEDICINE CLINIC | Facility: CLINIC | Age: 62
End: 2017-11-20

## 2017-11-20 NOTE — TELEPHONE ENCOUNTER
I was paged directly by patient and she is complaining of thick, cottage cheesy vaginal discharge without any odor. She was recently treated for a UTI and believes this is responsible for her current symptoms.  I have ordered a dose of diflucan but she will

## 2017-12-05 RX ORDER — IBUPROFEN 800 MG/1
TABLET ORAL
Qty: 30 TABLET | Refills: 0 | Status: SHIPPED | OUTPATIENT
Start: 2017-12-05 | End: 2018-03-16

## 2017-12-05 NOTE — TELEPHONE ENCOUNTER
Medication(s) to Refill:   Pending Prescriptions Disp Refills    IBUPROFEN 800 MG Oral Tab [Pharmacy Med Name: Ibuprofen Oral Tablet 800 MG] 30 tablet 0     Sig: take 1 tablet by mouth every 8 hours as needed for pain.  do not exceed 3 tablets per day

## 2017-12-19 ENCOUNTER — OFFICE VISIT (OUTPATIENT)
Dept: INTERNAL MEDICINE CLINIC | Facility: CLINIC | Age: 62
End: 2017-12-19

## 2017-12-19 ENCOUNTER — APPOINTMENT (OUTPATIENT)
Dept: LAB | Age: 62
End: 2017-12-19
Attending: NURSE PRACTITIONER
Payer: MEDICARE

## 2017-12-19 VITALS
HEART RATE: 79 BPM | BODY MASS INDEX: 25 KG/M2 | WEIGHT: 117.5 LBS | RESPIRATION RATE: 12 BRPM | DIASTOLIC BLOOD PRESSURE: 82 MMHG | TEMPERATURE: 99 F | SYSTOLIC BLOOD PRESSURE: 139 MMHG | OXYGEN SATURATION: 98 %

## 2017-12-19 DIAGNOSIS — N89.8 VAGINAL DISCHARGE: ICD-10-CM

## 2017-12-19 DIAGNOSIS — R10.2 VAGINAL PAIN: Primary | ICD-10-CM

## 2017-12-19 PROCEDURE — 36415 COLL VENOUS BLD VENIPUNCTURE: CPT | Performed by: NURSE PRACTITIONER

## 2017-12-19 PROCEDURE — 87510 GARDNER VAG DNA DIR PROBE: CPT | Performed by: NURSE PRACTITIONER

## 2017-12-19 PROCEDURE — 86780 TREPONEMA PALLIDUM: CPT | Performed by: NURSE PRACTITIONER

## 2017-12-19 PROCEDURE — 87389 HIV-1 AG W/HIV-1&-2 AB AG IA: CPT | Performed by: NURSE PRACTITIONER

## 2017-12-19 PROCEDURE — 86803 HEPATITIS C AB TEST: CPT | Performed by: NURSE PRACTITIONER

## 2017-12-19 PROCEDURE — 87480 CANDIDA DNA DIR PROBE: CPT | Performed by: NURSE PRACTITIONER

## 2017-12-19 PROCEDURE — 99213 OFFICE O/P EST LOW 20 MIN: CPT | Performed by: NURSE PRACTITIONER

## 2017-12-19 PROCEDURE — 87660 TRICHOMONAS VAGIN DIR PROBE: CPT | Performed by: NURSE PRACTITIONER

## 2017-12-19 PROCEDURE — 87340 HEPATITIS B SURFACE AG IA: CPT | Performed by: NURSE PRACTITIONER

## 2017-12-19 NOTE — PROGRESS NOTES
HPI:   Christina Pereira is a 65year old female. Patient presents with:  Vaginal Problem: Vaginal itching and swelling more than 2 weeks. Completed Diflucan that was prescribed Dr. Joana Guevara.     Patient presents with c/o vaginal discomfort and discharge HYSTERECTOMY      Comment: RUKHSANA and BSO, fibroids, 1987  2006: OTHER SURGICAL HISTORY  1/2008: OTHER SURGICAL HISTORY      Comment: R toe artificial cartilage, straighten out 4th               toe  2001,2003: REPAIR ROTATOR CUFF,CHRONIC      Comment: (manasaat edema. Vaginal vault with thin white discharge. Bimanual exam with + tenderness. Cultures obtained. ASSESSMENT AND PLAN:   Sarah Nunez is a 65year old female who presents with the following.  Differential includes BV, worsening vaginal atrophy/PO

## 2017-12-22 ENCOUNTER — TELEPHONE (OUTPATIENT)
Dept: INTERNAL MEDICINE CLINIC | Facility: CLINIC | Age: 62
End: 2017-12-22

## 2017-12-22 NOTE — TELEPHONE ENCOUNTER
I can send her oral medication for BV but she MUST avoid all alcohol including anything with alcohol in it or she will become valiantly ill. She has also already completed two courses of diflucan and Cx did not indicate Candida.  Try metronidazole first and

## 2017-12-22 NOTE — TELEPHONE ENCOUNTER
Spoke to patient prefers a oral medication opposed to Vaginal Metro gel, also vagina still swollen and c/o yeast infection (has tried OTC Montistat with no relief, please advise

## 2017-12-22 NOTE — TELEPHONE ENCOUNTER
Patient called me stating she has not heard back from office of [facility]. I do see that Claritza Elkins intended to order oral metronidazole for patient but it was not done.  Patient called me complaining her vaginal area is swollen (just as it was when she

## 2017-12-22 NOTE — TELEPHONE ENCOUNTER
Patient calling to speak with nurse. Patient still has symptoms and needs a prescription. Please call patient back.  Patient was seen on 12/19/17

## 2018-01-19 PROBLEM — M85.80 OSTEOPENIA, UNSPECIFIED LOCATION: Status: ACTIVE | Noted: 2018-01-19

## 2018-01-19 PROCEDURE — 86160 COMPLEMENT ANTIGEN: CPT | Performed by: INTERNAL MEDICINE

## 2018-01-19 PROCEDURE — 82570 ASSAY OF URINE CREATININE: CPT | Performed by: INTERNAL MEDICINE

## 2018-01-19 PROCEDURE — 81003 URINALYSIS AUTO W/O SCOPE: CPT | Performed by: INTERNAL MEDICINE

## 2018-01-19 PROCEDURE — 86225 DNA ANTIBODY NATIVE: CPT | Performed by: INTERNAL MEDICINE

## 2018-01-19 PROCEDURE — 84156 ASSAY OF PROTEIN URINE: CPT | Performed by: INTERNAL MEDICINE

## 2018-01-23 ENCOUNTER — OFFICE VISIT (OUTPATIENT)
Dept: INTERNAL MEDICINE CLINIC | Facility: CLINIC | Age: 63
End: 2018-01-23

## 2018-01-23 VITALS
WEIGHT: 117 LBS | HEART RATE: 88 BPM | BODY MASS INDEX: 25.24 KG/M2 | HEIGHT: 57 IN | RESPIRATION RATE: 18 BRPM | SYSTOLIC BLOOD PRESSURE: 138 MMHG | OXYGEN SATURATION: 98 % | DIASTOLIC BLOOD PRESSURE: 84 MMHG | TEMPERATURE: 98 F

## 2018-01-23 DIAGNOSIS — M35.9 UNDIFFERENTIATED CONNECTIVE TISSUE DISEASE (HCC): ICD-10-CM

## 2018-01-23 DIAGNOSIS — H57.89 EYE IRRITATION: Primary | ICD-10-CM

## 2018-01-23 PROCEDURE — G0008 ADMIN INFLUENZA VIRUS VAC: HCPCS | Performed by: PHYSICIAN ASSISTANT

## 2018-01-23 PROCEDURE — 90686 IIV4 VACC NO PRSV 0.5 ML IM: CPT | Performed by: PHYSICIAN ASSISTANT

## 2018-01-23 PROCEDURE — 99214 OFFICE O/P EST MOD 30 MIN: CPT | Performed by: PHYSICIAN ASSISTANT

## 2018-01-23 NOTE — PROGRESS NOTES
Spencer Fagan is a 65year old female. HPI:   Patient presents for bilat eye itching x 2 weeks. C/o eye redness, irritation, more dryness than usual.  Denies eyelid swelling or redness, changes in vision, AM crusting, excessive tearing.   She does h Comment: R toe artificial cartilage, straighten out 4th               toe  04/2016: OTHER SURGICAL HISTORY      Comment: Cervical spine fusion  2001,2003: REPAIR ROTATOR CUFF,CHRONIC      Comment: (bilateral)  2000: REVISE MEDIAN N/CARPAL TUNNEL SURG  Sept

## 2018-01-23 NOTE — PATIENT INSTRUCTIONS
Eye Irritation:  - cold compresses three times a day  - start Zaditor eye drops - follow directions on the box    Please see your eye doctor ASAP. Please see Dr. Lauro Schaffer for your wellness exam next month as planned.

## 2018-02-13 DIAGNOSIS — Z01.00 EYE EXAM, ROUTINE: Primary | ICD-10-CM

## 2018-02-13 DIAGNOSIS — Z79.899 EYE EXAM DUE TO HIGH RISK MEDICATION, ENCOUNTER FOR: ICD-10-CM

## 2018-03-16 ENCOUNTER — OFFICE VISIT (OUTPATIENT)
Dept: INTERNAL MEDICINE CLINIC | Facility: CLINIC | Age: 63
End: 2018-03-16

## 2018-03-16 VITALS
SYSTOLIC BLOOD PRESSURE: 120 MMHG | DIASTOLIC BLOOD PRESSURE: 72 MMHG | BODY MASS INDEX: 25.24 KG/M2 | TEMPERATURE: 98 F | WEIGHT: 117 LBS | HEART RATE: 68 BPM | HEIGHT: 57 IN

## 2018-03-16 DIAGNOSIS — M85.80 OSTEOPENIA, UNSPECIFIED LOCATION: ICD-10-CM

## 2018-03-16 DIAGNOSIS — N95.2 VAGINAL ATROPHY: ICD-10-CM

## 2018-03-16 DIAGNOSIS — Z12.39 SCREENING BREAST EXAMINATION: ICD-10-CM

## 2018-03-16 DIAGNOSIS — Z00.00 ROUTINE GENERAL MEDICAL EXAMINATION AT A HEALTH CARE FACILITY: Primary | ICD-10-CM

## 2018-03-16 DIAGNOSIS — M47.26 OSTEOARTHRITIS OF SPINE WITH RADICULOPATHY, LUMBAR REGION: ICD-10-CM

## 2018-03-16 DIAGNOSIS — M20.41 HAMMERTOE OF RIGHT FOOT: ICD-10-CM

## 2018-03-16 DIAGNOSIS — M75.101 ROTATOR CUFF SYNDROME OF RIGHT SHOULDER: ICD-10-CM

## 2018-03-16 DIAGNOSIS — M50.30 DEGENERATIVE DISC DISEASE, CERVICAL: ICD-10-CM

## 2018-03-16 DIAGNOSIS — F33.1 MODERATE EPISODE OF RECURRENT MAJOR DEPRESSIVE DISORDER (HCC): ICD-10-CM

## 2018-03-16 DIAGNOSIS — M35.9 UNDIFFERENTIATED CONNECTIVE TISSUE DISEASE (HCC): ICD-10-CM

## 2018-03-16 DIAGNOSIS — M48.02 SPINAL STENOSIS IN CERVICAL REGION: ICD-10-CM

## 2018-03-16 DIAGNOSIS — Z79.899 LONG-TERM USE OF PLAQUENIL: ICD-10-CM

## 2018-03-16 DIAGNOSIS — G89.29 OTHER CHRONIC PAIN: ICD-10-CM

## 2018-03-16 PROBLEM — M51.9 LUMBAR DISC LESION: Status: RESOLVED | Noted: 2017-01-06 | Resolved: 2018-03-16

## 2018-03-16 PROCEDURE — 96160 PT-FOCUSED HLTH RISK ASSMT: CPT | Performed by: INTERNAL MEDICINE

## 2018-03-16 PROCEDURE — 99396 PREV VISIT EST AGE 40-64: CPT | Performed by: INTERNAL MEDICINE

## 2018-03-16 PROCEDURE — G0439 PPPS, SUBSEQ VISIT: HCPCS | Performed by: INTERNAL MEDICINE

## 2018-03-16 RX ORDER — ESCITALOPRAM OXALATE 5 MG/1
5 TABLET ORAL DAILY
Qty: 30 TABLET | Refills: 1 | Status: SHIPPED | OUTPATIENT
Start: 2018-03-16 | End: 2018-04-27

## 2018-03-16 RX ORDER — IBUPROFEN 800 MG/1
TABLET ORAL
Qty: 30 TABLET | Refills: 0 | Status: SHIPPED | OUTPATIENT
Start: 2018-03-16 | End: 2019-01-05

## 2018-03-16 NOTE — PROGRESS NOTES
Lindsey Oh is a 65year old female. HPI:   Patient presents for medicare supervisit and additional issues noted below. 1. Undifferentiated CTDz: finally resumed care with rheumatology. Started plaquenil in 1/2018.  Tolerating it well but not imp and Lumbar DJDz and chronic R shoulder pain: R shoulder pain is really severe right now. Requesting referral to ortho. REVIEW OF SYSTEMS:   GENERAL HEALTH: feels well otherwise. No f/c  NEURO: denies any LOC.  She has frequent headaches with her Girtha  cuff syndrome    • Spinal cord tumor     c2 intraspinal tumor with cord compression, neurilemmoma    • Undifferentiated connective tissue disease [facility])       Past Surgical History:  2007 : BACK SURGERY      Comment: cer tumors removed  2010: COLONOSCOPY  1 again. She is going to wait until she sees physiatry and works on improving her mood. # Recurrent BV and Dyspareunia/vaginal dryness: well controlled/prevented with premarin. # Chronic Tension Headaches: 2/2 neck pain, see management as below.    # R sh RUKHSANA  Breast Cancer Screening: cont yearly mammogram  Bone Health: see above.  Takes calcium/vit BID  Lung Cancer Screening: (54 to 74 years, a history of smoking at least 30 pack-years and, if a former smoker, had quit within the previous 15 years): quit sm

## 2018-03-16 NOTE — PATIENT INSTRUCTIONS
For your mood (and hopefully this will also help your pain) please start lexapro (escitalopram) 5 mg once daily for 7 days. If you tolerate it well, please increase to 10 mg daily for 7 days.  If you continue to tolerate it well, please increase to 20 mg da

## 2018-04-27 ENCOUNTER — APPOINTMENT (OUTPATIENT)
Dept: LAB | Age: 63
End: 2018-04-27
Attending: INTERNAL MEDICINE
Payer: MEDICARE

## 2018-04-27 DIAGNOSIS — F33.1 MODERATE EPISODE OF RECURRENT MAJOR DEPRESSIVE DISORDER (HCC): ICD-10-CM

## 2018-04-27 DIAGNOSIS — E55.9 VITAMIN D DEFICIENCY: ICD-10-CM

## 2018-04-27 DIAGNOSIS — Z00.00 LABORATORY EXAM ORDERED AS PART OF ROUTINE GENERAL MEDICAL EXAMINATION: ICD-10-CM

## 2018-04-27 DIAGNOSIS — M35.9 UNDIFFERENTIATED CONNECTIVE TISSUE DISEASE (HCC): ICD-10-CM

## 2018-04-27 PROCEDURE — 36415 COLL VENOUS BLD VENIPUNCTURE: CPT | Performed by: INTERNAL MEDICINE

## 2018-04-27 PROCEDURE — 82306 VITAMIN D 25 HYDROXY: CPT | Performed by: INTERNAL MEDICINE

## 2018-04-27 PROCEDURE — 80061 LIPID PANEL: CPT | Performed by: INTERNAL MEDICINE

## 2018-04-27 NOTE — PROGRESS NOTES
Gely Ibarra is a 65year old female. HPI:   Patient presents for the followin. MDD 2/2 chronic pain: she has self titrated up to 15 mg of lexapro. She thinks the lexapro is \"activiting\" her. It makes her feel hyper and worsening her sleep. hematuria. No vaginal discharge, itching, or odor. SKIN: denies any unusual skin lesions or rashes  PSYCH: mood is as above.    EXT: denies edema     Wt Readings from Last 6 Encounters:  04/27/18 : 114 lb  03/16/18 : 117 lb  01/23/18 : 117 lb  01/19/18 : History:    Smoking status: Former Smoker                                                              Packs/day: 1.00      Years: 32.00        Quit date: 3/20/2003  Smokeless tobacco: Never Used                      Comment: quit in 2003  Alcohol use:  No joint injections, tylenol #4 by Dr. Soha Nuno but this was not helpful.   - gabapentin, tylenol, meloxicam, voltaren, ibuprofen, lidoerm patches, PT, HEP have not helped  # Chronic Pain 2/2 OA and Fibromyalgia: she has diffuse OA and UCTD but I do think fibromy

## 2018-05-01 ENCOUNTER — HOSPITAL ENCOUNTER (OUTPATIENT)
Dept: MAMMOGRAPHY | Age: 63
Discharge: HOME | End: 2018-05-01
Attending: INTERNAL MEDICINE
Payer: MEDICARE

## 2018-05-01 ENCOUNTER — HOSPITAL ENCOUNTER (OUTPATIENT)
Dept: BONE DENSITY | Age: 63
Discharge: HOME | End: 2018-05-01
Attending: INTERNAL MEDICINE
Payer: MEDICARE

## 2018-05-01 DIAGNOSIS — M50.30 DEGENERATIVE DISC DISEASE, CERVICAL: ICD-10-CM

## 2018-05-01 DIAGNOSIS — Z12.39 SCREENING BREAST EXAMINATION: ICD-10-CM

## 2018-05-01 DIAGNOSIS — R53.82 CFIDS (CHRONIC FATIGUE AND IMMUNE DYSFUNCTION SYNDROME) (HCC): ICD-10-CM

## 2018-05-01 DIAGNOSIS — M25.50 POLYARTHRALGIA: ICD-10-CM

## 2018-05-01 DIAGNOSIS — M35.9 UNDIFFERENTIATED CONNECTIVE TISSUE DISEASE (HCC): ICD-10-CM

## 2018-05-01 DIAGNOSIS — D89.89 CFIDS (CHRONIC FATIGUE AND IMMUNE DYSFUNCTION SYNDROME) (HCC): ICD-10-CM

## 2018-05-01 DIAGNOSIS — M75.101 ROTATOR CUFF SYNDROME OF RIGHT SHOULDER: ICD-10-CM

## 2018-05-01 DIAGNOSIS — R53.82 CHRONIC FATIGUE: ICD-10-CM

## 2018-05-01 DIAGNOSIS — M35.9 DIFFUSE DISEASE OF CONNECTIVE TISSUE (HCC): ICD-10-CM

## 2018-05-01 DIAGNOSIS — M47.26 OSTEOARTHRITIS OF SPINE WITH RADICULOPATHY, LUMBAR REGION: ICD-10-CM

## 2018-05-01 DIAGNOSIS — M25.50 PAIN IN JOINT, MULTIPLE SITES: ICD-10-CM

## 2018-05-01 DIAGNOSIS — M20.41 HAMMERTOE OF RIGHT FOOT: ICD-10-CM

## 2018-05-01 DIAGNOSIS — Z79.899 LONG-TERM USE OF PLAQUENIL: ICD-10-CM

## 2018-05-01 PROCEDURE — 77067 SCR MAMMO BI INCL CAD: CPT | Performed by: INTERNAL MEDICINE

## 2018-05-01 PROCEDURE — 77063 BREAST TOMOSYNTHESIS BI: CPT | Performed by: INTERNAL MEDICINE

## 2018-05-01 PROCEDURE — 77080 DXA BONE DENSITY AXIAL: CPT | Performed by: INTERNAL MEDICINE

## 2018-05-01 NOTE — PROGRESS NOTES
- Bone density test show osteopenia (low bone density)  - Results and treatment plan to be discussed at follow-up appointment.

## 2018-05-05 ENCOUNTER — OFFICE VISIT (OUTPATIENT)
Dept: INTERNAL MEDICINE CLINIC | Facility: CLINIC | Age: 63
End: 2018-05-05

## 2018-05-05 VITALS
SYSTOLIC BLOOD PRESSURE: 134 MMHG | WEIGHT: 115 LBS | BODY MASS INDEX: 24.14 KG/M2 | RESPIRATION RATE: 16 BRPM | DIASTOLIC BLOOD PRESSURE: 80 MMHG | HEART RATE: 77 BPM | TEMPERATURE: 99 F | OXYGEN SATURATION: 98 % | HEIGHT: 58 IN

## 2018-05-05 DIAGNOSIS — F33.1 MODERATE EPISODE OF RECURRENT MAJOR DEPRESSIVE DISORDER (HCC): ICD-10-CM

## 2018-05-05 DIAGNOSIS — N89.8 VAGINAL ODOR: Primary | ICD-10-CM

## 2018-05-05 DIAGNOSIS — N89.8 VAGINAL DISCHARGE: ICD-10-CM

## 2018-05-05 PROCEDURE — 87660 TRICHOMONAS VAGIN DIR PROBE: CPT | Performed by: PHYSICIAN ASSISTANT

## 2018-05-05 PROCEDURE — 87480 CANDIDA DNA DIR PROBE: CPT | Performed by: PHYSICIAN ASSISTANT

## 2018-05-05 PROCEDURE — 87491 CHLMYD TRACH DNA AMP PROBE: CPT | Performed by: PHYSICIAN ASSISTANT

## 2018-05-05 PROCEDURE — 99214 OFFICE O/P EST MOD 30 MIN: CPT | Performed by: PHYSICIAN ASSISTANT

## 2018-05-05 PROCEDURE — 87591 N.GONORRHOEAE DNA AMP PROB: CPT | Performed by: PHYSICIAN ASSISTANT

## 2018-05-05 PROCEDURE — 87510 GARDNER VAG DNA DIR PROBE: CPT | Performed by: PHYSICIAN ASSISTANT

## 2018-05-05 RX ORDER — ESCITALOPRAM OXALATE 10 MG/1
10 TABLET ORAL DAILY
Refills: 0 | COMMUNITY
Start: 2018-05-05 | End: 2018-09-12 | Stop reason: ALTCHOICE

## 2018-05-05 RX ORDER — FLUCONAZOLE 150 MG/1
150 TABLET ORAL ONCE
Qty: 1 TABLET | Refills: 0 | Status: SHIPPED | OUTPATIENT
Start: 2018-05-05 | End: 2018-05-08

## 2018-05-05 RX ORDER — METRONIDAZOLE 500 MG/1
500 TABLET ORAL 2 TIMES DAILY
Qty: 14 TABLET | Refills: 0 | Status: SHIPPED | OUTPATIENT
Start: 2018-05-05 | End: 2018-05-12

## 2018-05-05 NOTE — PATIENT INSTRUCTIONS
Current Symptoms:  - start metronidazole 500 mg tablets - 1 tablet twice a day x 7 days  - may take Diflucan (fluconazole) if developing vaginal itching or white/clumpy discharge    If swab positive again for bacterial vaginosis:  - start metronidazole gel

## 2018-05-05 NOTE — PROGRESS NOTES
HPI:  Lindsey Oh is a 65year old female who presents for vaginal odor and grey to white discharge x 3 days. Denies vaginal itching, burning, skin lesions, dysuria, hematuria, abd pain or back, nausea, diarrhea, fever, chills, sweats.   Hx of BV x3 e Comment: quit in 2003  Alcohol use:  No                 REVIEW OF SYSTEMS:  GENERAL: feels well otherwise  SKIN: no rashes  LUNGS: denies shortness of breath  CARDIOVASCULAR: denies chest pain  GI: per HPI  : per HPI    EXAM:  /80 (

## 2018-05-07 RX ORDER — METRONIDAZOLE 7.5 MG/G
GEL VAGINAL
Qty: 70 G | Refills: 5 | Status: SHIPPED | OUTPATIENT
Start: 2018-05-07 | End: 2018-09-12 | Stop reason: ALTCHOICE

## 2018-05-08 ENCOUNTER — TELEPHONE (OUTPATIENT)
Dept: INTERNAL MEDICINE CLINIC | Facility: CLINIC | Age: 63
End: 2018-05-08

## 2018-05-08 PROBLEM — M85.89 OSTEOPENIA OF MULTIPLE SITES: Status: ACTIVE | Noted: 2018-01-19

## 2018-05-08 RX ORDER — FLUCONAZOLE 150 MG/1
150 TABLET ORAL ONCE
Qty: 1 TABLET | Refills: 0 | Status: SHIPPED | OUTPATIENT
Start: 2018-05-08 | End: 2018-05-08

## 2018-05-08 NOTE — TELEPHONE ENCOUNTER
She is concerned about the length of treatment and developing another yeast infection.  Can you add refills

## 2018-05-08 NOTE — TELEPHONE ENCOUNTER
Patient states she always get a yeast infection with Flagyl, will you send a Diflucan with refills?  Please advise

## 2018-05-09 ENCOUNTER — TELEPHONE (OUTPATIENT)
Dept: INTERNAL MEDICINE CLINIC | Facility: CLINIC | Age: 63
End: 2018-05-09

## 2018-05-09 NOTE — TELEPHONE ENCOUNTER
Per MD message: Call patient and tell her she does not meet criteria for a prior auth on Lidocaine patch. She can purchase these patches OTC. Spoke to pt and informed. Pt verbalized understanding.

## 2018-05-15 ENCOUNTER — HOSPITAL ENCOUNTER (OUTPATIENT)
Dept: MAMMOGRAPHY | Facility: HOSPITAL | Age: 63
Discharge: HOME | End: 2018-05-15
Attending: INTERNAL MEDICINE
Payer: MEDICARE

## 2018-05-15 DIAGNOSIS — R92.2 INCONCLUSIVE MAMMOGRAM: ICD-10-CM

## 2018-05-15 PROCEDURE — 76642 ULTRASOUND BREAST LIMITED: CPT | Performed by: INTERNAL MEDICINE

## 2018-05-15 PROCEDURE — 77061 BREAST TOMOSYNTHESIS UNI: CPT | Performed by: INTERNAL MEDICINE

## 2018-05-15 PROCEDURE — 77065 DX MAMMO INCL CAD UNI: CPT | Performed by: INTERNAL MEDICINE

## 2018-06-21 ENCOUNTER — TELEPHONE (OUTPATIENT)
Dept: INTERNAL MEDICINE CLINIC | Facility: CLINIC | Age: 63
End: 2018-06-21

## 2018-06-21 NOTE — TELEPHONE ENCOUNTER
Patient calling to speak with nurse regarding UTI. Patient was informed that there are no appointments today and offered tomorrow with Dr Lorenzo Lo or Soham Talavera, but patient refused to wait.  Please call patient

## 2018-06-21 NOTE — TELEPHONE ENCOUNTER
Spoke to patient, no appointment today can go to IC but patient prefers to wait for Dr. Sreekanth Hoffmann, scheduled appointment 6/22/18 @ 10 am

## 2018-06-25 ENCOUNTER — HOSPITAL ENCOUNTER (OUTPATIENT)
Age: 63
Discharge: HOME | End: 2018-06-25
Payer: MEDICARE

## 2018-06-25 VITALS
OXYGEN SATURATION: 98 % | TEMPERATURE: 97 F | RESPIRATION RATE: 20 BRPM | SYSTOLIC BLOOD PRESSURE: 150 MMHG | DIASTOLIC BLOOD PRESSURE: 72 MMHG | HEART RATE: 83 BPM

## 2018-06-25 DIAGNOSIS — R25.2 MUSCLE CRAMPING: ICD-10-CM

## 2018-06-25 DIAGNOSIS — N39.0 URINARY TRACT INFECTION WITHOUT HEMATURIA, SITE UNSPECIFIED: Primary | ICD-10-CM

## 2018-06-25 PROCEDURE — 99214 OFFICE O/P EST MOD 30 MIN: CPT

## 2018-06-25 PROCEDURE — 87086 URINE CULTURE/COLONY COUNT: CPT | Performed by: FAMILY MEDICINE

## 2018-06-25 PROCEDURE — 87077 CULTURE AEROBIC IDENTIFY: CPT | Performed by: FAMILY MEDICINE

## 2018-06-25 PROCEDURE — 81002 URINALYSIS NONAUTO W/O SCOPE: CPT | Performed by: FAMILY MEDICINE

## 2018-06-25 RX ORDER — ACETAMINOPHEN 500 MG
1000 TABLET ORAL ONCE
Status: COMPLETED | OUTPATIENT
Start: 2018-06-25 | End: 2018-06-25

## 2018-06-25 RX ORDER — FLUCONAZOLE 150 MG/1
150 TABLET ORAL ONCE
Qty: 1 TABLET | Refills: 0 | Status: SHIPPED | OUTPATIENT
Start: 2018-06-25 | End: 2018-06-25

## 2018-06-25 RX ORDER — NITROFURANTOIN 25; 75 MG/1; MG/1
100 CAPSULE ORAL 2 TIMES DAILY
Qty: 14 CAPSULE | Refills: 0 | Status: SHIPPED | OUTPATIENT
Start: 2018-06-25 | End: 2018-07-02

## 2018-06-26 NOTE — ED PROVIDER NOTES
Patient Seen in: [facility]    History   Patient presents with:  Urinary Symptoms (urologic)    Stated Complaint: UTI     HPI    44-year-old female here with complaint of urinary frequency and burning for the past few days.   Daisha Morris family history is reviewed and is noncontributory to the presenting problem, except as indicated as above.     Smoking status: Former Smoker                                                              Packs/day: 1.00      Years: 32.00        Quit date: 3/2 other components within normal limits   URINE CULTURE, ROUTINE       ED Course as of Jun 25 2019  ------------------------------------------------------------      [facility]       Clinical Impression: UTI symptoms with muscle cramping  Course of Treatment: Brenda

## 2018-06-28 NOTE — ED NOTES
Called the patient and spoke to her regarding the urine culture result. Instructed to stop the antibiotic per Dr Lakesha Fox order. Patient states she is still feeling some discomfort. Instructed to follow up with PCP. Verbalized understanding.

## 2018-09-12 ENCOUNTER — OFFICE VISIT (OUTPATIENT)
Dept: INTERNAL MEDICINE CLINIC | Facility: CLINIC | Age: 63
End: 2018-09-12
Payer: COMMERCIAL

## 2018-09-12 VITALS
DIASTOLIC BLOOD PRESSURE: 70 MMHG | HEIGHT: 58 IN | OXYGEN SATURATION: 100 % | WEIGHT: 111 LBS | HEART RATE: 104 BPM | BODY MASS INDEX: 23.3 KG/M2 | SYSTOLIC BLOOD PRESSURE: 122 MMHG | TEMPERATURE: 100 F

## 2018-09-12 DIAGNOSIS — J06.9 UPPER RESPIRATORY TRACT INFECTION, UNSPECIFIED TYPE: ICD-10-CM

## 2018-09-12 DIAGNOSIS — J40 BRONCHITIS: Primary | ICD-10-CM

## 2018-09-12 PROCEDURE — 99213 OFFICE O/P EST LOW 20 MIN: CPT | Performed by: NURSE PRACTITIONER

## 2018-09-12 RX ORDER — DEXTROMETHORPHAN HYDROBROMIDE AND PROMETHAZINE HYDROCHLORIDE 15; 6.25 MG/5ML; MG/5ML
5 SYRUP ORAL 4 TIMES DAILY PRN
Qty: 1 BOTTLE | Refills: 0 | Status: SHIPPED | OUTPATIENT
Start: 2018-09-12 | End: 2018-11-08 | Stop reason: ALTCHOICE

## 2018-09-12 RX ORDER — METHYLPREDNISOLONE 4 MG/1
TABLET ORAL
Qty: 1 KIT | Refills: 0 | Status: SHIPPED | OUTPATIENT
Start: 2018-09-12 | End: 2018-11-08 | Stop reason: ALTCHOICE

## 2018-09-12 NOTE — PROGRESS NOTES
CHIEF COMPLAINT:   Patient presents with:  Cough: patient c/o cough x 4 days      HPI:   Chiquis Coello is a 59year old female who presents for upper respiratory symptoms for  4 days. Patient reports congestion, dry cough.  Cough is/is not keeping patien cuff syndrome    • Spinal cord tumor     c2 intraspinal tumor with cord compression, neurilemmoma    • Undifferentiated connective tissue disease [facility])       Past Surgical History:  2007 : BACK SURGERY      Comment:  cer tumors removed  2010: COLONOSCOPY moist. Posterior pharynx is mildly erythematous. PND exudates. Tonsils 2+    NECK: Supple, non-tender  LUNGS: clear to auscultation bilaterally, no wheezes or rhonchi. Breathing is non labored.   CARDIO: RRR without murmur  EXTREMITIES: no cyanosis, clubbin

## 2018-11-07 PROCEDURE — 86225 DNA ANTIBODY NATIVE: CPT | Performed by: INTERNAL MEDICINE

## 2018-11-07 PROCEDURE — 86160 COMPLEMENT ANTIGEN: CPT | Performed by: INTERNAL MEDICINE

## 2018-11-08 PROCEDURE — 86235 NUCLEAR ANTIGEN ANTIBODY: CPT | Performed by: INTERNAL MEDICINE

## 2018-11-08 PROCEDURE — 83883 ASSAY NEPHELOMETRY NOT SPEC: CPT | Performed by: INTERNAL MEDICINE

## 2018-11-08 PROCEDURE — 86200 CCP ANTIBODY: CPT | Performed by: INTERNAL MEDICINE

## 2018-11-08 PROCEDURE — 36415 COLL VENOUS BLD VENIPUNCTURE: CPT | Performed by: INTERNAL MEDICINE

## 2018-11-08 PROCEDURE — 84165 PROTEIN E-PHORESIS SERUM: CPT | Performed by: INTERNAL MEDICINE

## 2018-11-08 PROCEDURE — 86334 IMMUNOFIX E-PHORESIS SERUM: CPT | Performed by: INTERNAL MEDICINE

## 2018-11-27 ENCOUNTER — HOSPITAL ENCOUNTER (OUTPATIENT)
Age: 63
Discharge: HOME | End: 2018-11-27
Payer: MEDICARE

## 2018-11-27 VITALS
TEMPERATURE: 97 F | HEART RATE: 82 BPM | BODY MASS INDEX: 23.18 KG/M2 | HEIGHT: 59 IN | RESPIRATION RATE: 16 BRPM | SYSTOLIC BLOOD PRESSURE: 130 MMHG | OXYGEN SATURATION: 100 % | DIASTOLIC BLOOD PRESSURE: 77 MMHG | WEIGHT: 115 LBS

## 2018-11-27 DIAGNOSIS — N39.0 URINARY TRACT INFECTION WITH HEMATURIA, SITE UNSPECIFIED: Primary | ICD-10-CM

## 2018-11-27 DIAGNOSIS — R31.9 URINARY TRACT INFECTION WITH HEMATURIA, SITE UNSPECIFIED: Primary | ICD-10-CM

## 2018-11-27 PROCEDURE — 99214 OFFICE O/P EST MOD 30 MIN: CPT

## 2018-11-27 PROCEDURE — 87086 URINE CULTURE/COLONY COUNT: CPT | Performed by: PHYSICIAN ASSISTANT

## 2018-11-27 PROCEDURE — 81002 URINALYSIS NONAUTO W/O SCOPE: CPT | Performed by: PHYSICIAN ASSISTANT

## 2018-11-27 RX ORDER — NITROFURANTOIN 25; 75 MG/1; MG/1
100 CAPSULE ORAL 2 TIMES DAILY
Qty: 14 CAPSULE | Refills: 0 | Status: SHIPPED | OUTPATIENT
Start: 2018-11-27 | End: 2018-12-04

## 2018-11-27 RX ORDER — FLUCONAZOLE 150 MG/1
150 TABLET ORAL ONCE
Qty: 1 TABLET | Refills: 0 | Status: SHIPPED | OUTPATIENT
Start: 2018-11-27 | End: 2018-11-27

## 2018-11-28 NOTE — ED PROVIDER NOTES
Patient Seen in: [facility]    History   Patient presents with:  Urinary Symptoms (urologic)    Stated Complaint: UTI     HPI    71-year-old female here with complaint of dysuria, frequency and urgency that started today.   Ria Family history reviewed and is not pertinent to presenting problem. The patient's medication list, past medical history and social history elements  as listed in today's nurse's notes are reviewed and agree.    The patient's family history is revie warm.   Psychiatric: She has a normal mood and affect. Her behavior is normal. Judgment and thought content normal.   Nursing note and vitals reviewed.           ED Course     Labs Reviewed   POCT URINALYSIS DIPSTICK - Abnormal; Notable for the following co

## 2018-12-10 ENCOUNTER — TELEPHONE (OUTPATIENT)
Dept: INTERNAL MEDICINE CLINIC | Facility: CLINIC | Age: 63
End: 2018-12-10

## 2018-12-10 ENCOUNTER — OFFICE VISIT (OUTPATIENT)
Dept: INTERNAL MEDICINE CLINIC | Facility: CLINIC | Age: 63
End: 2018-12-10
Payer: COMMERCIAL

## 2018-12-10 VITALS
RESPIRATION RATE: 16 BRPM | OXYGEN SATURATION: 98 % | HEIGHT: 58 IN | BODY MASS INDEX: 24.14 KG/M2 | HEART RATE: 75 BPM | DIASTOLIC BLOOD PRESSURE: 70 MMHG | TEMPERATURE: 98 F | WEIGHT: 115 LBS | SYSTOLIC BLOOD PRESSURE: 128 MMHG

## 2018-12-10 DIAGNOSIS — B96.89 BACTERIAL VAGINOSIS: ICD-10-CM

## 2018-12-10 DIAGNOSIS — N76.0 BACTERIAL VAGINOSIS: ICD-10-CM

## 2018-12-10 DIAGNOSIS — N94.10 DYSPAREUNIA, FEMALE: ICD-10-CM

## 2018-12-10 DIAGNOSIS — N89.8 VAGINAL DISCHARGE: ICD-10-CM

## 2018-12-10 DIAGNOSIS — R35.0 URINARY FREQUENCY: Primary | ICD-10-CM

## 2018-12-10 PROCEDURE — 87660 TRICHOMONAS VAGIN DIR PROBE: CPT | Performed by: PHYSICIAN ASSISTANT

## 2018-12-10 PROCEDURE — 87510 GARDNER VAG DNA DIR PROBE: CPT | Performed by: PHYSICIAN ASSISTANT

## 2018-12-10 PROCEDURE — 99214 OFFICE O/P EST MOD 30 MIN: CPT | Performed by: PHYSICIAN ASSISTANT

## 2018-12-10 PROCEDURE — 87480 CANDIDA DNA DIR PROBE: CPT | Performed by: PHYSICIAN ASSISTANT

## 2018-12-10 PROCEDURE — 81003 URINALYSIS AUTO W/O SCOPE: CPT | Performed by: PHYSICIAN ASSISTANT

## 2018-12-10 RX ORDER — FLUCONAZOLE 150 MG/1
150 TABLET ORAL ONCE
Qty: 1 TABLET | Refills: 0 | Status: SHIPPED | OUTPATIENT
Start: 2018-12-10 | End: 2018-12-10

## 2018-12-10 RX ORDER — METRONIDAZOLE 500 MG/1
500 TABLET ORAL 2 TIMES DAILY
Qty: 14 TABLET | Refills: 0 | Status: SHIPPED | OUTPATIENT
Start: 2018-12-10 | End: 2018-12-17

## 2018-12-10 NOTE — TELEPHONE ENCOUNTER
Patient was seen at the urgent care for a UTI on 11/27/18. She stated that she was given an antibiotic and it's not working. Patient scheduled an appointment for tomorrow at 8:00am with Jenny Breen but would like to be seen today. Please advise.

## 2018-12-10 NOTE — PROGRESS NOTES
HPI:  Leodan Martinez is a 59year old female who presents for urinary symptoms x 4 days. C/o increased urinary frequency, urgency, nausea, vaginal odor, vaginal discharge - grey, mild vaginal itching.   Denies fever, chills, sweats, dysuria, abd pain, vo INJECTION MULTIPLE LEVEL Left 2/2/2017    Performed by Judie Trinidad MD at [facility] MAIN OR     Social History    Tobacco Use      Smoking status: Former Smoker        Packs/day: 1.00        Years: 32.00        Pack years: 28        Quit date: 3/20/2003 March 2019 for wellness visit.

## 2018-12-10 NOTE — PATIENT INSTRUCTIONS
Bacterial vaginosis:  - start metronidazole 500 mg - 1 tablet twice a day    May take diflucan if needed for yeast infection symptoms (clumpy white discharge, itching).     Urinary symptoms:  - increase water intake  - avoid bladder irritants (caffeine, alc

## 2019-01-03 ENCOUNTER — TELEPHONE (OUTPATIENT)
Dept: OBGYN CLINIC | Facility: CLINIC | Age: 64
End: 2019-01-03

## 2019-01-03 NOTE — TELEPHONE ENCOUNTER
Pt calling states she has been getting recurrent UTI's/ bladder infections. Has been treating with PCP Dr Diego Schultz and since can't find infection or reason why she keeps getting these symptoms referred her to [facility].  Pt asking if Dr Marina Hurd can see her sooner t

## 2019-01-03 NOTE — TELEPHONE ENCOUNTER
Please advise pt this is the next available slot. Pt has not been seen since 2015; technically a new patient.

## 2019-01-04 ENCOUNTER — TELEPHONE (OUTPATIENT)
Dept: INTERNAL MEDICINE CLINIC | Facility: CLINIC | Age: 64
End: 2019-01-04

## 2019-01-04 NOTE — TELEPHONE ENCOUNTER
C/o urinary frequency, odor, abdominal pressure/cramping, dysuria, dark colored urine for over 1 week. Denies fever, nausea, vomiting, chills, hematuria. Cloudy urine, flank pain. Per franci Marley to schedule tomorrow at 0800.      Future Appointment

## 2019-01-05 ENCOUNTER — OFFICE VISIT (OUTPATIENT)
Dept: INTERNAL MEDICINE CLINIC | Facility: CLINIC | Age: 64
End: 2019-01-05
Payer: MEDICARE

## 2019-01-05 VITALS
OXYGEN SATURATION: 99 % | TEMPERATURE: 98 F | WEIGHT: 116.75 LBS | SYSTOLIC BLOOD PRESSURE: 124 MMHG | HEIGHT: 58 IN | HEART RATE: 88 BPM | BODY MASS INDEX: 24.51 KG/M2 | RESPIRATION RATE: 16 BRPM | DIASTOLIC BLOOD PRESSURE: 72 MMHG

## 2019-01-05 DIAGNOSIS — R30.0 DYSURIA: ICD-10-CM

## 2019-01-05 DIAGNOSIS — R35.0 URINARY FREQUENCY: Primary | ICD-10-CM

## 2019-01-05 DIAGNOSIS — R39.15 URINARY URGENCY: ICD-10-CM

## 2019-01-05 LAB
APPEARANCE: CLEAR
MULTISTIX LOT#: ABNORMAL NUMERIC
PH, URINE: 6 (ref 4.5–8)
SPECIFIC GRAVITY: 1.01 (ref 1–1.03)
URINE-COLOR: YELLOW
UROBILINOGEN,SEMI-QN: 0.2 MG/DL (ref 0–1.9)

## 2019-01-05 PROCEDURE — 87086 URINE CULTURE/COLONY COUNT: CPT | Performed by: PHYSICIAN ASSISTANT

## 2019-01-05 PROCEDURE — 87077 CULTURE AEROBIC IDENTIFY: CPT | Performed by: PHYSICIAN ASSISTANT

## 2019-01-05 PROCEDURE — 87186 SC STD MICRODIL/AGAR DIL: CPT | Performed by: PHYSICIAN ASSISTANT

## 2019-01-05 PROCEDURE — 81003 URINALYSIS AUTO W/O SCOPE: CPT | Performed by: PHYSICIAN ASSISTANT

## 2019-01-05 PROCEDURE — 99214 OFFICE O/P EST MOD 30 MIN: CPT | Performed by: PHYSICIAN ASSISTANT

## 2019-01-05 RX ORDER — FLUCONAZOLE 150 MG/1
150 TABLET ORAL ONCE
Qty: 1 TABLET | Refills: 0 | Status: SHIPPED | OUTPATIENT
Start: 2019-01-05 | End: 2019-01-05

## 2019-01-05 RX ORDER — NITROFURANTOIN 25; 75 MG/1; MG/1
100 CAPSULE ORAL 2 TIMES DAILY
Qty: 10 CAPSULE | Refills: 0 | Status: SHIPPED | OUTPATIENT
Start: 2019-01-05 | End: 2019-01-10

## 2019-01-05 NOTE — PATIENT INSTRUCTIONS
Urinary tract infection:  - start antibiotic (nitrofurantoin) - 1 tablet twice a day x 5 days  - increase water intake  - avoid bladder irritants    Take fluconazole if you develop signs of a yeast infection (vaginal itching, discharge).     Follow up with

## 2019-01-05 NOTE — PROGRESS NOTES
HPI:  Bienvenido Pinedo is a 59year old female who presents for urinary symptoms for 7 days. Patient complains of increased urinary frequency, urgency, burning with urination, mild lower abd pain.   Denies fever, chills, sweats, hematuria, back pain, vagin Right 12/1/2016    Performed by Teodoro Fay MD at [facility] MAIN OR   • COLONOSCOPY  2010   • HYSTERECTOMY  1985    RUKHSANA and BSO, fibroids, 1987   • OTHER SURGICAL HISTORY  2006   • OTHER SURGICAL HISTORY  1/2008    R toe artificial cartilage, straighten out will take only if she develops symptoms. # Hx of recurrent BV: we tried doing twice weekly vaginal metronidazole this summer but pt kept developing recurrent yeast infections. Will f/u with GYN. # Dyspareunia: likely 2/2 recurrent BV.   Will f/u with GYN

## 2019-05-13 ENCOUNTER — LAB ENCOUNTER (OUTPATIENT)
Dept: LAB | Age: 64
End: 2019-05-13
Attending: ORTHOPAEDIC SURGERY
Payer: MEDICARE

## 2019-05-13 ENCOUNTER — OFFICE VISIT (OUTPATIENT)
Dept: INTERNAL MEDICINE CLINIC | Facility: CLINIC | Age: 64
End: 2019-05-13
Payer: MEDICARE

## 2019-05-13 VITALS
RESPIRATION RATE: 16 BRPM | SYSTOLIC BLOOD PRESSURE: 134 MMHG | DIASTOLIC BLOOD PRESSURE: 68 MMHG | HEART RATE: 62 BPM | TEMPERATURE: 98 F | WEIGHT: 108.5 LBS | BODY MASS INDEX: 23.41 KG/M2 | HEIGHT: 57 IN

## 2019-05-13 DIAGNOSIS — G89.29 OTHER CHRONIC PAIN: ICD-10-CM

## 2019-05-13 DIAGNOSIS — F33.1 MODERATE EPISODE OF RECURRENT MAJOR DEPRESSIVE DISORDER (HCC): ICD-10-CM

## 2019-05-13 DIAGNOSIS — E55.9 VITAMIN D DEFICIENCY: ICD-10-CM

## 2019-05-13 DIAGNOSIS — M06.4 INFLAMMATORY POLYARTHRITIS (HCC): ICD-10-CM

## 2019-05-13 DIAGNOSIS — Z00.00 ROUTINE GENERAL MEDICAL EXAMINATION AT A HEALTH CARE FACILITY: ICD-10-CM

## 2019-05-13 DIAGNOSIS — M47.812 CERVICAL SPONDYLOSIS WITHOUT MYELOPATHY: Primary | ICD-10-CM

## 2019-05-13 DIAGNOSIS — M35.9 UNDIFFERENTIATED CONNECTIVE TISSUE DISEASE (HCC): ICD-10-CM

## 2019-05-13 DIAGNOSIS — Z12.39 SCREENING FOR BREAST CANCER: ICD-10-CM

## 2019-05-13 DIAGNOSIS — M48.02 CERVICAL STENOSIS OF SPINE: ICD-10-CM

## 2019-05-13 DIAGNOSIS — S46.011D TRAUMATIC TEAR OF RIGHT ROTATOR CUFF, SUBSEQUENT ENCOUNTER: ICD-10-CM

## 2019-05-13 DIAGNOSIS — M19.011 PRIMARY OSTEOARTHRITIS OF RIGHT SHOULDER: ICD-10-CM

## 2019-05-13 DIAGNOSIS — R07.89 ATYPICAL CHEST PAIN: ICD-10-CM

## 2019-05-13 DIAGNOSIS — M47.812 CERVICAL SPONDYLOSIS WITHOUT MYELOPATHY: ICD-10-CM

## 2019-05-13 DIAGNOSIS — M75.101 ROTATOR CUFF SYNDROME OF RIGHT SHOULDER: ICD-10-CM

## 2019-05-13 PROBLEM — J41.0 SMOKERS' COUGH (HCC): Chronic | Status: ACTIVE | Noted: 2019-05-13

## 2019-05-13 PROBLEM — J41.0 SMOKERS' COUGH (HCC): Chronic | Status: RESOLVED | Noted: 2019-05-13 | Resolved: 2019-05-13

## 2019-05-13 PROCEDURE — 87081 CULTURE SCREEN ONLY: CPT

## 2019-05-13 PROCEDURE — 86160 COMPLEMENT ANTIGEN: CPT

## 2019-05-13 PROCEDURE — 36415 COLL VENOUS BLD VENIPUNCTURE: CPT

## 2019-05-13 PROCEDURE — 84156 ASSAY OF PROTEIN URINE: CPT

## 2019-05-13 PROCEDURE — 86140 C-REACTIVE PROTEIN: CPT

## 2019-05-13 PROCEDURE — 82570 ASSAY OF URINE CREATININE: CPT

## 2019-05-13 PROCEDURE — 85652 RBC SED RATE AUTOMATED: CPT

## 2019-05-13 PROCEDURE — 82248 BILIRUBIN DIRECT: CPT

## 2019-05-13 PROCEDURE — 81003 URINALYSIS AUTO W/O SCOPE: CPT

## 2019-05-13 PROCEDURE — 86225 DNA ANTIBODY NATIVE: CPT

## 2019-05-13 PROCEDURE — 85025 COMPLETE CBC W/AUTO DIFF WBC: CPT

## 2019-05-13 PROCEDURE — 93000 ELECTROCARDIOGRAM COMPLETE: CPT | Performed by: INTERNAL MEDICINE

## 2019-05-13 PROCEDURE — 80053 COMPREHEN METABOLIC PANEL: CPT

## 2019-05-13 PROCEDURE — 96160 PT-FOCUSED HLTH RISK ASSMT: CPT | Performed by: INTERNAL MEDICINE

## 2019-05-13 PROCEDURE — 80061 LIPID PANEL: CPT

## 2019-05-13 PROCEDURE — G0439 PPPS, SUBSEQ VISIT: HCPCS | Performed by: INTERNAL MEDICINE

## 2019-05-13 RX ORDER — DOCUSATE SODIUM 100 MG/1
100 CAPSULE, LIQUID FILLED ORAL 2 TIMES DAILY
Qty: 60 CAPSULE | Refills: 0 | Status: SHIPPED | OUTPATIENT
Start: 2019-05-13 | End: 2019-06-11

## 2019-05-13 RX ORDER — HYDROCODONE BITARTRATE AND ACETAMINOPHEN 5; 325 MG/1; MG/1
1 TABLET ORAL EVERY 8 HOURS PRN
Qty: 30 TABLET | Refills: 0 | Status: ON HOLD | OUTPATIENT
Start: 2019-05-13 | End: 2019-05-31

## 2019-05-13 NOTE — PATIENT INSTRUCTIONS
You need 3 eight ounce servings of calcium/vitamin D daily. This includes spinach, kale, broccoli, almonds, milk, yogurt, or cottage cheese. I also advise you get the shingrix vaccine once in a lifetime.  This is NEW and considered better than the previo

## 2019-05-13 NOTE — PROGRESS NOTES
sed rate is normal  C-reactive protein is normal  potassium is above normal  white blood cell count is lower than 6 and 12 months ago  C3 is normal

## 2019-05-13 NOTE — PROGRESS NOTES
Izabel Blunt is a 59year old female. HPI:   Patient presents for medicare supervisit and following issues and pre-op evaluation requested by Dr. Alison Stevens.    1. Chronic rotator cuff tear which is large and retracted and not amenable to repair s cain. She does not feel anxious. Feels she brings herself out of her low moods. Denies depression.    EXT: denies edema       Wt Readings from Last 6 Encounters:  05/13/19 : 108 lb 8 oz  02/26/19 : 115 lb  01/30/19 : 115 lb  01/29/19 : 115 lb  01/05/19 : 1 (bilateral)   • REVISE MEDIAN N/CARPAL TUNNEL SURG  2000   • SURGERY - EXTERNAL  September 2007    C-spine surgery for Intraspinal tumor   • TRANSFORAMINAL LUMBAR EPIDURAL STEROID INJECTION MULTIPLE LEVEL Left 2/9/2017    Performed by Mj Franklin MD shoulder replacement by Melissa Parikh on 5/30/2019. R eported risk of composite outcome of myocardial infarction, cardiac arrest, or death 30 days after noncardiac surgery based on RCRI points is 3.9% (95% CI 2.8%-5.4%) for 0 points.  She is at acceptable r w/leukopenia and pos EVERT: I have re-ordered labs she is due for by rheum. She is not on HCQ b/c she is overdue for optho visit - she has this scheduled and will then f/u with rheum.    # Osteopenia: she did not tolerate alendronate 2/2 GERD so has not taken

## 2019-05-15 ENCOUNTER — LABORATORY ENCOUNTER (OUTPATIENT)
Dept: LAB | Age: 64
End: 2019-05-15
Payer: MEDICARE

## 2019-05-15 DIAGNOSIS — M19.011 OSTEOARTHRITIS OF RIGHT SHOULDER, UNSPECIFIED OSTEOARTHRITIS TYPE: ICD-10-CM

## 2019-05-15 PROCEDURE — 86901 BLOOD TYPING SEROLOGIC RH(D): CPT

## 2019-05-15 PROCEDURE — 86900 BLOOD TYPING SEROLOGIC ABO: CPT

## 2019-05-15 PROCEDURE — 86850 RBC ANTIBODY SCREEN: CPT

## 2019-05-16 DIAGNOSIS — E87.5 HYPERKALEMIA: Primary | ICD-10-CM

## 2019-05-16 NOTE — PROGRESS NOTES
White blood cell count is low. This is lower than 6 months ago  Potassium level is elevated. Please discuss this with your primary care provider.  Do not eat foods that are high in potassium (bananas)  Liver and kidney function are normal   - Markers for co

## 2019-05-17 ENCOUNTER — TELEPHONE (OUTPATIENT)
Dept: INTERNAL MEDICINE CLINIC | Facility: CLINIC | Age: 64
End: 2019-05-17

## 2019-05-17 ENCOUNTER — APPOINTMENT (OUTPATIENT)
Dept: LAB | Facility: HOSPITAL | Age: 64
End: 2019-05-17
Attending: INTERNAL MEDICINE
Payer: MEDICARE

## 2019-05-17 DIAGNOSIS — E87.5 HYPERKALEMIA: ICD-10-CM

## 2019-05-17 PROCEDURE — 36415 COLL VENOUS BLD VENIPUNCTURE: CPT

## 2019-05-17 PROCEDURE — 80048 BASIC METABOLIC PNL TOTAL CA: CPT

## 2019-05-17 NOTE — TELEPHONE ENCOUNTER
----- Message from Boy Sewell MD sent at 5/16/2019  3:05 PM CDT -----  Please ensure patient reviews mychart message from [address]. Please order repeat BMP.

## 2019-05-17 NOTE — TELEPHONE ENCOUNTER
Pt provided with instructions and verbalized understanding. She states that she feels K+ being elevated is not a fluke since she has been having heart palpitations which she did discuss with you at [facility].      Reiterated to avoid foods high in K+, if heart

## 2019-05-20 ENCOUNTER — TELEPHONE (OUTPATIENT)
Dept: SURGERY | Facility: CLINIC | Age: 64
End: 2019-05-20

## 2019-05-21 ENCOUNTER — TELEPHONE (OUTPATIENT)
Dept: INTERNAL MEDICINE CLINIC | Facility: CLINIC | Age: 64
End: 2019-05-21

## 2019-05-21 NOTE — TELEPHONE ENCOUNTER
[facility], calling in, seeking EKG results/tracing to be faxed to [phone]    T: [phone]

## 2019-05-21 NOTE — TELEPHONE ENCOUNTER
Pre op ekg faxed with confirmation. Confirmation was received. Copy of form made and placed in teal accordion file. Original form sent to scanning.

## 2019-05-23 ENCOUNTER — TELEPHONE (OUTPATIENT)
Dept: INTERNAL MEDICINE CLINIC | Facility: CLINIC | Age: 64
End: 2019-05-23

## 2019-05-23 NOTE — TELEPHONE ENCOUNTER
Pretty Crandall from presurgical at [facility] calling for a fax of this patient's EKG for procedure next week 5/30/19.  Please fax to her at [phone]

## 2019-05-29 NOTE — H&P
Click to print Maru Valdivia 578 for scanning     Office Visit     5/29/2019  1400 ERICKA Gomez MD       SURGERY, ORTHOPEDIC   Traumatic complete tear of right rotator cuff, subsequent encounter +1   Negative   • Osteoarthrosis, unspecified whether generalized or localized, unspecified site     • Osteopenia     • Osteopenia, unspecified location 1/19/2018   • Osteoporosis     • Rotator cuff syndrome     • Spinal cord tumor       c2 intraspinal tumor Years since quittin.1      Smokeless tobacco: Never Used      Tobacco comment: quit in     Alcohol use: No      Alcohol/week: 0.0 oz    Drug use: No      Comment: in her 20s she used many drugs but never IVDU            REVIEW OF SYSTEMS:

## 2019-05-30 ENCOUNTER — HOSPITAL ENCOUNTER (INPATIENT)
Facility: HOSPITAL | Age: 64
LOS: 2 days | Discharge: HOME HEALTH | DRG: 483 | End: 2019-06-01
Attending: ORTHOPAEDIC SURGERY | Admitting: ORTHOPAEDIC SURGERY
Payer: MEDICARE

## 2019-05-30 ENCOUNTER — ANESTHESIA EVENT (OUTPATIENT)
Dept: SURGERY | Facility: HOSPITAL | Age: 64
DRG: 483 | End: 2019-05-30
Payer: MEDICARE

## 2019-05-30 ENCOUNTER — APPOINTMENT (OUTPATIENT)
Dept: GENERAL RADIOLOGY | Facility: HOSPITAL | Age: 64
DRG: 483 | End: 2019-05-30
Attending: ORTHOPAEDIC SURGERY
Payer: MEDICARE

## 2019-05-30 ENCOUNTER — ANESTHESIA (OUTPATIENT)
Dept: SURGERY | Facility: HOSPITAL | Age: 64
DRG: 483 | End: 2019-05-30
Payer: MEDICARE

## 2019-05-30 DIAGNOSIS — S46.011D TRAUMATIC TEAR OF RIGHT ROTATOR CUFF, SUBSEQUENT ENCOUNTER: ICD-10-CM

## 2019-05-30 DIAGNOSIS — M19.011 OSTEOARTHRITIS OF RIGHT SHOULDER, UNSPECIFIED OSTEOARTHRITIS TYPE: Primary | ICD-10-CM

## 2019-05-30 PROCEDURE — 73020 X-RAY EXAM OF SHOULDER: CPT | Performed by: ORTHOPAEDIC SURGERY

## 2019-05-30 PROCEDURE — 0RRJ00Z REPLACEMENT OF RIGHT SHOULDER JOINT WITH REVERSE BALL AND SOCKET SYNTHETIC SUBSTITUTE, OPEN APPROACH: ICD-10-PCS | Performed by: ORTHOPAEDIC SURGERY

## 2019-05-30 PROCEDURE — 3E0T3BZ INTRODUCTION OF ANESTHETIC AGENT INTO PERIPHERAL NERVES AND PLEXI, PERCUTANEOUS APPROACH: ICD-10-PCS | Performed by: ANESTHESIOLOGY

## 2019-05-30 PROCEDURE — 99233 SBSQ HOSP IP/OBS HIGH 50: CPT | Performed by: HOSPITALIST

## 2019-05-30 DEVICE — DELTA XTEND PREMIERON X-LINKED PE HUMERAL CUP HIGH MOBILITY SIZE 38 +3MM
Type: IMPLANTABLE DEVICE | Site: SHOULDER | Status: FUNCTIONAL
Brand: DELTA XTEND

## 2019-05-30 DEVICE — DELTA XTEND NON LOCKING METAGLENE SCREW DIA 4.5 LG 18MM
Type: IMPLANTABLE DEVICE | Site: SHOULDER | Status: FUNCTIONAL
Brand: DELTA XTEND

## 2019-05-30 DEVICE — DELTA XTEND LOCKING METAGLENE SCREW DIA 4.5 LG 24MM
Type: IMPLANTABLE DEVICE | Site: SHOULDER | Status: FUNCTIONAL
Brand: DELTA XTEND

## 2019-05-30 DEVICE — DELTA XTEND CEMENTLESS MODULAR HUMERAL STEM DIA 10 HA
Type: IMPLANTABLE DEVICE | Site: SHOULDER | Status: FUNCTIONAL
Brand: DELTA XTEND

## 2019-05-30 DEVICE — DELTA XTEND STANDARD GLENOSPHERE DIA 38MM STD
Type: IMPLANTABLE DEVICE | Site: SHOULDER | Status: FUNCTIONAL
Brand: DELTA XTEND

## 2019-05-30 DEVICE — DELTA XTEND CEMENTLESS MODULAR ECCENTRIC EPIPHYSIS SZ1 / RIGHT HA
Type: IMPLANTABLE DEVICE | Site: SHOULDER | Status: FUNCTIONAL
Brand: DELTA XTEND

## 2019-05-30 RX ORDER — DIPHENHYDRAMINE HYDROCHLORIDE 50 MG/ML
25 INJECTION INTRAMUSCULAR; INTRAVENOUS ONCE AS NEEDED
Status: ACTIVE | OUTPATIENT
Start: 2019-05-30 | End: 2019-05-30

## 2019-05-30 RX ORDER — HYDROMORPHONE HYDROCHLORIDE 1 MG/ML
0.4 INJECTION, SOLUTION INTRAMUSCULAR; INTRAVENOUS; SUBCUTANEOUS EVERY 5 MIN PRN
Status: DISCONTINUED | OUTPATIENT
Start: 2019-05-30 | End: 2019-05-30 | Stop reason: HOSPADM

## 2019-05-30 RX ORDER — HYDROCODONE BITARTRATE AND ACETAMINOPHEN 5; 325 MG/1; MG/1
1-2 TABLET ORAL EVERY 6 HOURS PRN
Status: DISCONTINUED | OUTPATIENT
Start: 2019-05-30 | End: 2019-05-30

## 2019-05-30 RX ORDER — METOCLOPRAMIDE HYDROCHLORIDE 5 MG/ML
10 INJECTION INTRAMUSCULAR; INTRAVENOUS EVERY 6 HOURS PRN
Status: DISCONTINUED | OUTPATIENT
Start: 2019-05-30 | End: 2019-06-01

## 2019-05-30 RX ORDER — OXYCODONE HCL 10 MG/1
10 TABLET, FILM COATED, EXTENDED RELEASE ORAL
Status: COMPLETED | OUTPATIENT
Start: 2019-05-30 | End: 2019-05-30

## 2019-05-30 RX ORDER — ACETAMINOPHEN 325 MG/1
650 TABLET ORAL 4 TIMES DAILY
Status: DISCONTINUED | OUTPATIENT
Start: 2019-05-30 | End: 2019-05-31

## 2019-05-30 RX ORDER — HYDROMORPHONE HYDROCHLORIDE 1 MG/ML
0.3 INJECTION, SOLUTION INTRAMUSCULAR; INTRAVENOUS; SUBCUTANEOUS EVERY 2 HOUR PRN
Status: DISCONTINUED | OUTPATIENT
Start: 2019-05-30 | End: 2019-06-01

## 2019-05-30 RX ORDER — MIDAZOLAM HYDROCHLORIDE 1 MG/ML
1 INJECTION INTRAMUSCULAR; INTRAVENOUS EVERY 5 MIN PRN
Status: DISCONTINUED | OUTPATIENT
Start: 2019-05-30 | End: 2019-05-30 | Stop reason: HOSPADM

## 2019-05-30 RX ORDER — TIZANIDINE 4 MG/1
4 TABLET ORAL 3 TIMES DAILY PRN
Status: DISCONTINUED | OUTPATIENT
Start: 2019-05-30 | End: 2019-06-01

## 2019-05-30 RX ORDER — ONDANSETRON 2 MG/ML
4 INJECTION INTRAMUSCULAR; INTRAVENOUS AS NEEDED
Status: DISCONTINUED | OUTPATIENT
Start: 2019-05-30 | End: 2019-05-30 | Stop reason: HOSPADM

## 2019-05-30 RX ORDER — HYDROMORPHONE HYDROCHLORIDE 1 MG/ML
0.2 INJECTION, SOLUTION INTRAMUSCULAR; INTRAVENOUS; SUBCUTANEOUS EVERY 2 HOUR PRN
Status: DISCONTINUED | OUTPATIENT
Start: 2019-05-30 | End: 2019-06-01

## 2019-05-30 RX ORDER — HYDROXYCHLOROQUINE SULFATE 200 MG/1
200 TABLET, FILM COATED ORAL
Status: DISCONTINUED | OUTPATIENT
Start: 2019-05-30 | End: 2019-06-01

## 2019-05-30 RX ORDER — ACETAMINOPHEN 500 MG
1000 TABLET ORAL ONCE
Status: DISCONTINUED | OUTPATIENT
Start: 2019-05-30 | End: 2019-05-30

## 2019-05-30 RX ORDER — METOCLOPRAMIDE HYDROCHLORIDE 5 MG/ML
10 INJECTION INTRAMUSCULAR; INTRAVENOUS AS NEEDED
Status: DISCONTINUED | OUTPATIENT
Start: 2019-05-30 | End: 2019-05-30 | Stop reason: HOSPADM

## 2019-05-30 RX ORDER — SODIUM CHLORIDE, SODIUM LACTATE, POTASSIUM CHLORIDE, CALCIUM CHLORIDE 600; 310; 30; 20 MG/100ML; MG/100ML; MG/100ML; MG/100ML
INJECTION, SOLUTION INTRAVENOUS CONTINUOUS
Status: DISCONTINUED | OUTPATIENT
Start: 2019-05-30 | End: 2019-05-30

## 2019-05-30 RX ORDER — ACETAMINOPHEN 325 MG/1
650 TABLET ORAL ONCE
Status: COMPLETED | OUTPATIENT
Start: 2019-05-30 | End: 2019-05-30

## 2019-05-30 RX ORDER — ALBUTEROL SULFATE 90 UG/1
1 AEROSOL, METERED RESPIRATORY (INHALATION) EVERY 4 HOURS PRN
Status: DISCONTINUED | OUTPATIENT
Start: 2019-05-30 | End: 2019-06-01

## 2019-05-30 RX ORDER — CEFAZOLIN SODIUM/WATER 2 G/20 ML
2 SYRINGE (ML) INTRAVENOUS ONCE
Status: COMPLETED | OUTPATIENT
Start: 2019-05-30 | End: 2019-05-30

## 2019-05-30 RX ORDER — SCOLOPAMINE TRANSDERMAL SYSTEM 1 MG/1
1 PATCH, EXTENDED RELEASE TRANSDERMAL ONCE
Status: DISCONTINUED | OUTPATIENT
Start: 2019-05-30 | End: 2019-06-01

## 2019-05-30 RX ORDER — BISACODYL 10 MG
10 SUPPOSITORY, RECTAL RECTAL
Status: DISCONTINUED | OUTPATIENT
Start: 2019-05-30 | End: 2019-06-01

## 2019-05-30 RX ORDER — HYDROMORPHONE HYDROCHLORIDE 1 MG/ML
0.4 INJECTION, SOLUTION INTRAMUSCULAR; INTRAVENOUS; SUBCUTANEOUS EVERY 2 HOUR PRN
Status: DISCONTINUED | OUTPATIENT
Start: 2019-05-30 | End: 2019-06-01

## 2019-05-30 RX ORDER — DEXAMETHASONE SODIUM PHOSPHATE 4 MG/ML
4 VIAL (ML) INJECTION AS NEEDED
Status: DISCONTINUED | OUTPATIENT
Start: 2019-05-30 | End: 2019-05-30 | Stop reason: HOSPADM

## 2019-05-30 RX ORDER — ENOXAPARIN SODIUM 100 MG/ML
40 INJECTION SUBCUTANEOUS DAILY
Status: DISCONTINUED | OUTPATIENT
Start: 2019-05-31 | End: 2019-06-01

## 2019-05-30 RX ORDER — HYDROCODONE BITARTRATE AND ACETAMINOPHEN 5; 325 MG/1; MG/1
2 TABLET ORAL AS NEEDED
Status: DISCONTINUED | OUTPATIENT
Start: 2019-05-30 | End: 2019-05-30 | Stop reason: HOSPADM

## 2019-05-30 RX ORDER — SODIUM CHLORIDE, SODIUM LACTATE, POTASSIUM CHLORIDE, CALCIUM CHLORIDE 600; 310; 30; 20 MG/100ML; MG/100ML; MG/100ML; MG/100ML
INJECTION, SOLUTION INTRAVENOUS CONTINUOUS
Status: DISCONTINUED | OUTPATIENT
Start: 2019-05-30 | End: 2019-06-01

## 2019-05-30 RX ORDER — CEFAZOLIN SODIUM/WATER 2 G/20 ML
2 SYRINGE (ML) INTRAVENOUS EVERY 8 HOURS
Status: COMPLETED | OUTPATIENT
Start: 2019-05-31 | End: 2019-05-31

## 2019-05-30 RX ORDER — DOCUSATE SODIUM 100 MG/1
100 CAPSULE, LIQUID FILLED ORAL 2 TIMES DAILY
Status: DISCONTINUED | OUTPATIENT
Start: 2019-05-30 | End: 2019-05-30

## 2019-05-30 RX ORDER — OXYCODONE HYDROCHLORIDE 5 MG/1
2.5 TABLET ORAL EVERY 4 HOURS PRN
Status: DISCONTINUED | OUTPATIENT
Start: 2019-05-30 | End: 2019-05-31

## 2019-05-30 RX ORDER — POLYETHYLENE GLYCOL 3350 17 G/17G
17 POWDER, FOR SOLUTION ORAL DAILY PRN
Status: DISCONTINUED | OUTPATIENT
Start: 2019-05-30 | End: 2019-06-01

## 2019-05-30 RX ORDER — HYDROCODONE BITARTRATE AND ACETAMINOPHEN 5; 325 MG/1; MG/1
1 TABLET ORAL AS NEEDED
Status: DISCONTINUED | OUTPATIENT
Start: 2019-05-30 | End: 2019-05-30 | Stop reason: HOSPADM

## 2019-05-30 RX ORDER — SODIUM CHLORIDE, SODIUM LACTATE, POTASSIUM CHLORIDE, CALCIUM CHLORIDE 600; 310; 30; 20 MG/100ML; MG/100ML; MG/100ML; MG/100ML
INJECTION, SOLUTION INTRAVENOUS CONTINUOUS
Status: DISCONTINUED | OUTPATIENT
Start: 2019-05-30 | End: 2019-05-30 | Stop reason: HOSPADM

## 2019-05-30 RX ORDER — OXYCODONE HYDROCHLORIDE 10 MG/1
10 TABLET ORAL EVERY 4 HOURS PRN
Status: DISCONTINUED | OUTPATIENT
Start: 2019-05-30 | End: 2019-05-31

## 2019-05-30 RX ORDER — LABETALOL HYDROCHLORIDE 5 MG/ML
5 INJECTION, SOLUTION INTRAVENOUS EVERY 5 MIN PRN
Status: DISCONTINUED | OUTPATIENT
Start: 2019-05-30 | End: 2019-05-30 | Stop reason: HOSPADM

## 2019-05-30 RX ORDER — OXYCODONE HYDROCHLORIDE 5 MG/1
5 TABLET ORAL EVERY 4 HOURS PRN
Status: DISCONTINUED | OUTPATIENT
Start: 2019-05-30 | End: 2019-05-31

## 2019-05-30 RX ORDER — ACETAMINOPHEN 500 MG
1000 TABLET ORAL ONCE AS NEEDED
Status: DISCONTINUED | OUTPATIENT
Start: 2019-05-30 | End: 2019-05-30 | Stop reason: HOSPADM

## 2019-05-30 RX ORDER — NALOXONE HYDROCHLORIDE 0.4 MG/ML
80 INJECTION, SOLUTION INTRAMUSCULAR; INTRAVENOUS; SUBCUTANEOUS AS NEEDED
Status: DISCONTINUED | OUTPATIENT
Start: 2019-05-30 | End: 2019-05-30 | Stop reason: HOSPADM

## 2019-05-30 RX ORDER — KETOROLAC TROMETHAMINE 15 MG/ML
15 INJECTION, SOLUTION INTRAMUSCULAR; INTRAVENOUS EVERY 6 HOURS
Status: COMPLETED | OUTPATIENT
Start: 2019-05-30 | End: 2019-05-31

## 2019-05-30 RX ORDER — DOCUSATE SODIUM 100 MG/1
100 CAPSULE, LIQUID FILLED ORAL 2 TIMES DAILY
Status: DISCONTINUED | OUTPATIENT
Start: 2019-05-30 | End: 2019-06-01

## 2019-05-30 RX ORDER — ACETAMINOPHEN 325 MG/1
TABLET ORAL
Status: COMPLETED
Start: 2019-05-30 | End: 2019-05-30

## 2019-05-30 RX ORDER — ONDANSETRON 2 MG/ML
4 INJECTION INTRAMUSCULAR; INTRAVENOUS EVERY 4 HOURS PRN
Status: DISCONTINUED | OUTPATIENT
Start: 2019-05-30 | End: 2019-06-01

## 2019-05-30 RX ORDER — SODIUM PHOSPHATE, DIBASIC AND SODIUM PHOSPHATE, MONOBASIC 7; 19 G/133ML; G/133ML
1 ENEMA RECTAL ONCE AS NEEDED
Status: DISCONTINUED | OUTPATIENT
Start: 2019-05-30 | End: 2019-06-01

## 2019-05-30 RX ORDER — MEPERIDINE HYDROCHLORIDE 25 MG/ML
12.5 INJECTION INTRAMUSCULAR; INTRAVENOUS; SUBCUTANEOUS AS NEEDED
Status: DISCONTINUED | OUTPATIENT
Start: 2019-05-30 | End: 2019-05-30 | Stop reason: HOSPADM

## 2019-05-30 RX ORDER — PROCHLORPERAZINE EDISYLATE 5 MG/ML
10 INJECTION INTRAMUSCULAR; INTRAVENOUS EVERY 6 HOURS PRN
Status: DISCONTINUED | OUTPATIENT
Start: 2019-05-30 | End: 2019-06-01

## 2019-05-30 NOTE — BRIEF OP NOTE
Pre-Operative Diagnosis: Traumatic tear of right rotator cuff, subsequent encounter [S46.011D]  Osteoarthritis of right shoulder, unspecified osteoarthritis type [M19.011]     Post-Operative Diagnosis: same as pre-op      Procedure Performed:   Procedure(s

## 2019-05-30 NOTE — ANESTHESIA POSTPROCEDURE EVALUATION
[address] Patient Status:  Surgery Admit - Inpt   Age/Gender 59year old female MRN [id number]   Location [facility] Attending Alysia Padilla MD   Hosp Day # 0 PCP Mitchell Cox MD       Anesthesia P

## 2019-05-30 NOTE — INTERVAL H&P NOTE
Pre-op Diagnosis: Traumatic tear of right rotator cuff, subsequent encounter [S46.011D]  Osteoarthritis of right shoulder, unspecified osteoarthritis type [M19.011]    The above referenced H&P was reviewed by Nara Martinez MD on 5/30/2019, the patient wa

## 2019-05-30 NOTE — ANESTHESIA PREPROCEDURE EVALUATION
PRE-OP EVALUATION    Patient Name: Satish Caraballo    Pre-op Diagnosis: Traumatic tear of right rotator cuff, subsequent encounter [S46.011D]  Osteoarthritis of right shoulder, unspecified osteoarthritis type [M19.011]    Procedure(s):  RIGHT REVERSE TOTA Endo/Other                           (+) arthritis       Pulmonary                 (-) recent URI          Neuro/Psych      (+) depression  (+) anxiety                      Cervical spine fusion      Past Surgical History:   Procedur K 4.4 05/17/2019     05/17/2019    CO2 32.0 05/17/2019    BUN 13 05/17/2019    CREATSERUM 0.96 05/17/2019    GLU 94 05/17/2019    CA 9.1 05/17/2019            Airway      Mallampati: II  Mouth opening: >3 FB  TM distance: > 6 cm  Neck ROM: full Ca

## 2019-05-31 PROCEDURE — 99232 SBSQ HOSP IP/OBS MODERATE 35: CPT | Performed by: HOSPITALIST

## 2019-05-31 RX ORDER — ACETAMINOPHEN 325 MG/1
325 TABLET ORAL EVERY 4 HOURS PRN
Status: DISCONTINUED | OUTPATIENT
Start: 2019-05-31 | End: 2019-06-01

## 2019-05-31 RX ORDER — OXYCODONE HYDROCHLORIDE AND ACETAMINOPHEN 5; 325 MG/1; MG/1
1 TABLET ORAL EVERY 4 HOURS PRN
Qty: 40 TABLET | Refills: 0 | Status: SHIPPED | OUTPATIENT
Start: 2019-05-31 | End: 2019-06-10

## 2019-05-31 RX ORDER — OXYCODONE HYDROCHLORIDE AND ACETAMINOPHEN 5; 325 MG/1; MG/1
2 TABLET ORAL EVERY 4 HOURS PRN
Status: DISCONTINUED | OUTPATIENT
Start: 2019-05-31 | End: 2019-06-01

## 2019-05-31 RX ORDER — OXYCODONE HYDROCHLORIDE AND ACETAMINOPHEN 5; 325 MG/1; MG/1
1 TABLET ORAL EVERY 4 HOURS PRN
Status: DISCONTINUED | OUTPATIENT
Start: 2019-05-31 | End: 2019-06-01

## 2019-05-31 RX ORDER — ACETAMINOPHEN 325 MG/1
650 TABLET ORAL EVERY 4 HOURS PRN
Status: DISCONTINUED | OUTPATIENT
Start: 2019-05-31 | End: 2019-06-01

## 2019-05-31 NOTE — PLAN OF CARE
Patient was admitted from PACU. She is AOX4. Denies any pain. + numbness on her Right Shoulder. Patient able to move fingers, moderate hand grasp. MF dressing C/D/I, ice gel maintained. DTV. SCDs maintained. IS education done.  Up with standby assist using

## 2019-05-31 NOTE — CM/SW NOTE
Per Evelin Barrientos with [address], they can accept pt and she will meet with pt.      Eric Long RN, [address] [place] [address]  Extension [phone]

## 2019-05-31 NOTE — PROGRESS NOTES
NURSING ADMISSION NOTE      Patient admitted via BED  Oriented to room. Safety precautions initiated. Bed in low position. Call light in reach.   Dr. Lucretia Farrell made aware of the consult

## 2019-05-31 NOTE — CONSULTS
[facility]  [address] Patient Status:  Inpatient    1955 MRN [id number]   [facility] 3SW-A Attending Hayden Capellan MD   [address] Day # 0 PCP Al Chiu MD     Reason for consult: connective tissue disease Cervical spine fusion   • REPAIR ROTATOR CUFF,CHRONIC  3015,[id number]    (bilateral)   • REVISE MEDIAN N/CARPAL TUNNEL SURG  2000   • SURGERY - EXTERNAL  September 2007    C-spine surgery for Intraspinal tumor   • TRANSFORAMINAL LUMBAR EPIDURAL STEROID [facility] Powder, Breath Activated Inhale 1 puff into the lungs every 4 (four) hours as needed. Disp: 1 each Rfl: 0   lidocaine 5 % External Patch Place 1 patch onto the skin See Admin Instructions.  On for 12 hours and off for 12 hours Disp: 90 patch Rfl: 1   RESTAS

## 2019-05-31 NOTE — CM/SW NOTE
60 yo sp reverse total shoulder replacement. PT is recommending home physical therapy. Discussed with RN who will request home health order from surgeon. Referred to Yeni Grossman with [facility]. Await acceptance.      Brenda Al RN, CCM

## 2019-05-31 NOTE — PROGRESS NOTES
[facility] Note:  Renal Dose Adjustment for Ketorolac (TORADOL)    Satish Caraballo has been prescribed Ketorolac (TORADOL) 30 mg IV every 6 hours scheduled for pain.     Estimated crcl was 45 ml/min using scr 0.96 from 5/17/19    Her calculated creatinin

## 2019-05-31 NOTE — PROGRESS NOTES
Block still partially in effect    /60 (BP Location: Left arm)   Pulse 90   Temp 98.6 °F (37 °C) (Oral)   Resp 16   Ht 4' 9\" (1.448 m)   Wt 106 lb 2.4 oz (48.1 kg)   SpO2 99%   BMI 22.97 kg/m²     Dressing intact. Moves fingers.   Has sensation at a

## 2019-05-31 NOTE — PAYOR COMM NOTE
--------------  ADMISSION REVIEW     Payor: BCBS MEDICARE ADV PPO  Subscriber #:  [id number]  Authorization Number: [id number]    Admit date: 5/30/19  Admit time: 1944       Admitting Physician: Rolan Tatum MD  Attending Physician:  Rolan Tatum, It will not replace the rotator cuff and definitely would not add any external rotation. She voices her understanding wishes to proceed  All of their questions were answered and they are in agreement with the treatment plan.             Pre-Operative Diagn

## 2019-05-31 NOTE — PROGRESS NOTES
[facility]  Progress Note     Sherie Aponte Patient Status:  Inpatient    1955 MRN [id number]   [facility] 3SW-A Attending Sabiha Medina MD   Hosp Day # 1 PCP Alexsander Zuleta MD     Chief Complaint: Medical Management    S: P 100 mg Oral BID   • enoxaparin  40 mg Subcutaneous Daily       ASSESSMENT / PLAN:     1. S/P TSA  2. SHADY:  Plaquenil    Plan of care: Per ortho, will follow peripherally.     Sara Chacon MD  [facility]

## 2019-05-31 NOTE — HOME CARE LIAISON
MET WITH PTNT AND OFFERED CHOICE  OF AGENCIES. PTNT AGREEABLE TO [facility]. MET WITH PTNT TO DISCUSS HOME HEALTH SERVICES AND COVERAGE CRITERIA. PTNT AGREEABLE TO [facility]. PTNT GIVEN RESIDENTIAL BROCHURE.  RESIDENTIAL WITH PROVIDE SN/PT ON DISC

## 2019-05-31 NOTE — OCCUPATIONAL THERAPY NOTE
OCCUPATIONAL THERAPY QUICK EVALUATION - INPATIENT    Room Number: 377/377-A  Evaluation Date: 5/31/2019     Type of Evaluation: Quick Eval  Presenting Problem: s/p R reverse TSA 5/30/19    Physician Order: IP Consult to Occupational Therapy  Reason for The cartilage, straighten out 4th toe   • OTHER SURGICAL HISTORY  04/2016    Cervical spine fusion   • REPAIR ROTATOR CUFF,CHRONIC  1182,4613    (bilateral)   • REVISE MEDIAN N/CARPAL TUNNEL SURG  2000   • SHOULDER TOTAL Right 5/30/2019    Performed by Kj Mustafa, end range    Upper extremity strength is within functional limits; except R shoulder d/t R TSA; L shoulder flexion 4+/5    NEUROLOGICAL FINDINGS                   ACTIVITY TOLERANCE                         O2 SATURATIONS                ACTIVITIES OF DAILY technique, bed mobility via Mod I with good return demo following NWB, educated patient on providing support under surgical arm while sleeping.  Patient also educated on OT role, safety, fall prevention, pain management, shower transfers, car transfers with supervision at home  Patient able to dress lower extremities:  At supervision level or above; patient reports will have supervision at home  Patient/Caregiver able to demonstrate safety with ADLS: At supervision level or above; patient reports will have sup

## 2019-05-31 NOTE — PROGRESS NOTES
Post Op Day 1 Ortho Note: Right reverse TSA    Status Post Nerve Block:  Type of Nerve Block: Right Interscalene  Single Injection Nerve Block    Post op review: No evidence of immediate block related complications, No paresthesia noted and Patients with l

## 2019-05-31 NOTE — PLAN OF CARE
Pt A&Ox4. On ra,  &scds in place. Tolerating diet. Bs active, passing gas given laxative this am per noc rn. Voiding in bathroom up with min assist & RUE in sling. Participating in therapy. Vss.  Per ortho md pt to stay at hosp to night & ok to order felicitas

## 2019-05-31 NOTE — PHYSICAL THERAPY NOTE
PHYSICAL THERAPY QUICK EVALUATION - INPATIENT    Room Number: 377/377-A  Evaluation Date: 5/31/2019  Presenting Problem: R Reverse TSA on 5/30/19  Physician Order: PT Eval and Treat    Problem List  Active Problems:    Mixed connective tissue disease (HC Performed by Federica Mcgowan MD at [address] [facility] [address]   • TRANSFORAMINAL LUMBAR EPIDURAL STEROID INJECTION MULTIPLE LEVEL Left 2/2/2017    Performed by Federica Mcgowan MD at [facility]  Type of Home: House   Home Layout: Multi-level  Reda Peasant 45.44   CMS Modifier (G-Code): CK      FUNCTIONAL ABILITY STATUS  Gait Assessment  Gait Assistance: Supervision  Distance (ft): 300  Assistive Device: None  Pattern: Within Functional Limits  Stoop/Curb Assistance: Modified independent  Comment : scores pe

## 2019-06-01 VITALS
HEART RATE: 70 BPM | OXYGEN SATURATION: 96 % | WEIGHT: 106.13 LBS | HEIGHT: 57 IN | RESPIRATION RATE: 16 BRPM | BODY MASS INDEX: 22.9 KG/M2 | DIASTOLIC BLOOD PRESSURE: 72 MMHG | SYSTOLIC BLOOD PRESSURE: 103 MMHG | TEMPERATURE: 98 F

## 2019-06-01 RX ORDER — DIPHENHYDRAMINE HCL 25 MG
CAPSULE ORAL
Status: DISCONTINUED
Start: 2019-06-01 | End: 2019-06-01

## 2019-06-01 RX ORDER — DIPHENHYDRAMINE HCL 25 MG
25 CAPSULE ORAL ONCE
Status: COMPLETED | OUTPATIENT
Start: 2019-06-01 | End: 2019-06-01

## 2019-06-01 NOTE — PROGRESS NOTES
[address] Patient Status:  Inpatient    1955 MRN [id number]   [facility] 3SW-A Attending Rolan Tatum MD   Hosp Day # 2 PCP Lee Ann Leyva MD     Subjective:  S/P RIGHT Reverse Total Shoulder Arthroplasty  Sy

## 2019-06-01 NOTE — PLAN OF CARE
Left shoulder with Aquacel dressing intact, dry and clean. Gel ice pack noted to left shoulder. Denies any numbness/tingling to left arm/hands. Renata CORDERO came/examined patient for Dr. Mary Song. Per ortho patient cleared for discharge to home.   Discu

## 2019-06-01 NOTE — PROGRESS NOTES
Acute Pain Service    Post Op Day 2 Ortho Note    Assessed patient in bed. Patient rates pain 2/10 at rest. Patient states Percocet is working well to manage pain; denies itching/nausea/dizziness.  Able to wiggle fingers and make a fist with surgical arm; r

## 2019-06-01 NOTE — PLAN OF CARE
A&O x 4. VSS. On RA. RUE pain well controlled with PRN pain medications. Aquacel dressing to right shoulder C/D/I. Sling and ice therapy in place. Up with stand by assist to bathroom, voiding without issue. SCD's/Lovenox.  Reviewed POC, pain management, and

## 2019-06-01 NOTE — PROGRESS NOTES
NURSING DISCHARGE NOTE    Discharged Home via Wheelchair. Accompanied by Family member  Belongings Taken by patient/family   Discharge patient with referral to [facility].   Discussed discharge instructions with patient, she verbalized under

## 2019-06-03 NOTE — CM/SW NOTE
06/03/19 0800   Discharge disposition   Expected discharge disposition Home-Health   Name of Facillity[facility]Home Care[facility]Hospice [facility]   Discharge transportation Private car   DC 6/1/19

## 2019-06-03 NOTE — OPERATIVE REPORT
[address]    PATIENT'S NAME: Kristine Flowers   ATTENDING PHYSICIAN: Dianelys Saucedo M.D. OPERATING PHYSICIAN: Dianelys Saucedo M.D.    PATIENT ACCOUNT#:   [de-identified]    LOCATION:  [address] [place] [address]  MEDICAL RECORD #:   [id number]       DATE OF BIRTH: anterior aspect. Circumferential excision of the labrum was sharply performed. The template for the metaglene was positioned and the guide pin placed. Reamer was passed over the pin, including the Dev reamer.   Drill was passed over this for the julius

## 2019-06-03 NOTE — PAYOR COMM NOTE
--------------  DISCHARGE REVIEW    Payor: BCBS MEDICARE ADV PPO  Subscriber #:  [id number]  Authorization Number: [id number]    Admit date: 5/30/19  Admit time:  1944  Discharge Date: 6/1/2019 12:20 PM     Admitting Physician: Edward Mane MD  [facility]

## 2019-06-11 NOTE — TELEPHONE ENCOUNTER
Refill requested:   Requested Prescriptions     Pending Prescriptions Disp Refills   • DOCUSATE SODIUM 100 MG Oral Cap [Pharmacy Med Name: Docusate Sodium 100 Mg Cap [facility]] 60 capsule 0     Sig: Take 1 capsule (100 mg total) by mouth 2 (two) times daily.

## 2019-06-12 RX ORDER — DOCUSATE SODIUM 100 MG/1
100 CAPSULE, LIQUID FILLED ORAL 2 TIMES DAILY PRN
Qty: 60 CAPSULE | Refills: 0 | Status: SHIPPED | OUTPATIENT
Start: 2019-06-12 | End: 2019-07-12

## 2019-07-13 RX ORDER — DOCUSATE SODIUM 100 MG/1
CAPSULE, LIQUID FILLED ORAL
Qty: 60 CAPSULE | Refills: 3 | Status: SHIPPED | OUTPATIENT
Start: 2019-07-13

## 2019-07-13 NOTE — TELEPHONE ENCOUNTER
Docusate sodium 100mg last filled 6/12/19 #60    LOV  5/13/19 - PreOp exam    RTC  - 3 months for chronic pain     Upcoming appt - none     Labs - 5-13-19

## 2019-10-03 ENCOUNTER — OFFICE VISIT (OUTPATIENT)
Dept: INTERNAL MEDICINE CLINIC | Facility: CLINIC | Age: 64
End: 2019-10-03
Payer: MEDICARE

## 2019-10-03 VITALS
BODY MASS INDEX: 23 KG/M2 | SYSTOLIC BLOOD PRESSURE: 116 MMHG | HEART RATE: 88 BPM | WEIGHT: 106 LBS | DIASTOLIC BLOOD PRESSURE: 70 MMHG | TEMPERATURE: 98 F

## 2019-10-03 DIAGNOSIS — N76.0 ACUTE VAGINITIS: Primary | ICD-10-CM

## 2019-10-03 DIAGNOSIS — M19.011 OSTEOARTHRITIS OF RIGHT SHOULDER, UNSPECIFIED OSTEOARTHRITIS TYPE: ICD-10-CM

## 2019-10-03 DIAGNOSIS — Z23 INFLUENZA VACCINE NEEDED: ICD-10-CM

## 2019-10-03 PROCEDURE — G0008 ADMIN INFLUENZA VIRUS VAC: HCPCS | Performed by: NURSE PRACTITIONER

## 2019-10-03 PROCEDURE — 87510 GARDNER VAG DNA DIR PROBE: CPT | Performed by: NURSE PRACTITIONER

## 2019-10-03 PROCEDURE — 87491 CHLMYD TRACH DNA AMP PROBE: CPT | Performed by: NURSE PRACTITIONER

## 2019-10-03 PROCEDURE — 87591 N.GONORRHOEAE DNA AMP PROB: CPT | Performed by: NURSE PRACTITIONER

## 2019-10-03 PROCEDURE — 87480 CANDIDA DNA DIR PROBE: CPT | Performed by: NURSE PRACTITIONER

## 2019-10-03 PROCEDURE — 99214 OFFICE O/P EST MOD 30 MIN: CPT | Performed by: NURSE PRACTITIONER

## 2019-10-03 PROCEDURE — 87660 TRICHOMONAS VAGIN DIR PROBE: CPT | Performed by: NURSE PRACTITIONER

## 2019-10-03 PROCEDURE — 90686 IIV4 VACC NO PRSV 0.5 ML IM: CPT | Performed by: NURSE PRACTITIONER

## 2019-10-03 RX ORDER — MELOXICAM 15 MG/1
15 TABLET ORAL DAILY
Qty: 90 TABLET | Refills: 0 | Status: SHIPPED | OUTPATIENT
Start: 2019-10-03 | End: 2020-02-11 | Stop reason: SINTOL

## 2019-10-03 NOTE — PATIENT INSTRUCTIONS
Bacterial Vaginosis    You have a vaginal infection called bacterial vaginosis (BV). Both good and bad bacteria are present in a healthy vagina. BV occurs when these bacteria get out of balance. The number of bad bacteria increase.  And the number of good · You have a fever of 100.4ºF (38ºC) or higher, or as directed by your provider. · Your symptoms worsen, or they don’t go away within a few days of starting treatment. · You have new pain in the lower belly or pelvic region.   · You have side effects that What side effects may I notice from receiving this medicine?   Side effects that you should report to your doctor or health care professional as soon as possible:  · allergic reactions like skin rash, itching or hives, swelling of the face, lips, or tongue · methotrexate  · other NSAIDs, medicines for pain and inflammation, like ibuprofen and naproxen  · pemetrexed  What if I miss a dose? If you miss a dose, take it as soon as you can. If it is almost time for your next dose, take only that dose.  Do not hanna This medicine can cause ulcers and bleeding in the stomach and intestines at any time during treatment. This can happen with no warning and may cause death. There is increased risk with taking this medicine for a long time.  Smoking, drinking alcohol, older

## 2019-10-03 NOTE — PROGRESS NOTES
HPI:   Kalee Kelsey is a 62year old female patient presents with c/o vaginal discharge for the past 6 days. She denies any pelvic pain or dyspareunia. Patient reports the vaginal discharge is milky and has + odor. No urinary frequency or urgency.  No b Osteoarthrosis, unspecified whether generalized or localized, unspecified site    • Osteopenia    • Osteopenia, unspecified location 1/19/2018   • Osteoporosis    • PONV (postoperative nausea and vomiting)    • Rotator cuff syndrome    • Spinal cord tumor Grandmother    • Cancer Neg    • Stroke Neg       Social History:   Social History    Tobacco Use      Smoking status: Former Smoker        Packs/day: 1.00        Years: 32.00        Pack years: 28        Quit date: 3/20/2003        Years since quittin Dispense: 90 tablet; Refill: 0    Follow up if symptoms are not improving.

## 2019-10-04 ENCOUNTER — TELEPHONE (OUTPATIENT)
Dept: INTERNAL MEDICINE CLINIC | Facility: CLINIC | Age: 64
End: 2019-10-04

## 2019-10-04 ENCOUNTER — HOSPITAL ENCOUNTER (OUTPATIENT)
Dept: MAMMOGRAPHY | Age: 64
Discharge: HOME | End: 2019-10-04
Attending: INTERNAL MEDICINE
Payer: MEDICARE

## 2019-10-04 DIAGNOSIS — Z12.39 SCREENING FOR BREAST CANCER: ICD-10-CM

## 2019-10-04 DIAGNOSIS — M19.011 OSTEOARTHRITIS OF RIGHT SHOULDER, UNSPECIFIED OSTEOARTHRITIS TYPE: ICD-10-CM

## 2019-10-04 PROCEDURE — 77067 SCR MAMMO BI INCL CAD: CPT | Performed by: INTERNAL MEDICINE

## 2019-10-04 PROCEDURE — 77063 BREAST TOMOSYNTHESIS BI: CPT | Performed by: INTERNAL MEDICINE

## 2019-10-04 RX ORDER — FLUCONAZOLE 150 MG/1
150 TABLET ORAL ONCE
Qty: 1 TABLET | Refills: 0 | Status: SHIPPED | OUTPATIENT
Start: 2019-10-04 | End: 2019-10-04

## 2019-10-04 RX ORDER — METRONIDAZOLE 500 MG/1
500 TABLET ORAL 2 TIMES DAILY
Qty: 14 TABLET | Refills: 0 | Status: SHIPPED | OUTPATIENT
Start: 2019-10-04 | End: 2019-10-11

## 2019-10-04 NOTE — TELEPHONE ENCOUNTER
----- Message from REANRD Galvan sent at 10/4/2019 11:10 AM CDT -----  Please inform patient +BV. Please send metronidazole 500 mg PO BID x 7 days. No alcohol ingestion (even avoid in mouthwash) while taking.  Please also send Diflucan 150 mg PO x

## 2019-11-25 ENCOUNTER — TELEPHONE (OUTPATIENT)
Dept: INTERNAL MEDICINE CLINIC | Facility: CLINIC | Age: 64
End: 2019-11-25

## 2019-11-25 NOTE — TELEPHONE ENCOUNTER
Patient calling because she thinks she has another vaginal infection; please call to triage.  IFEANYI set up next available appointment for her to come in to be seen on this Friday, 11/20/19

## 2019-11-29 ENCOUNTER — OFFICE VISIT (OUTPATIENT)
Dept: INTERNAL MEDICINE CLINIC | Facility: CLINIC | Age: 64
End: 2019-11-29
Payer: MEDICARE

## 2019-11-29 VITALS
RESPIRATION RATE: 16 BRPM | SYSTOLIC BLOOD PRESSURE: 124 MMHG | WEIGHT: 107.5 LBS | DIASTOLIC BLOOD PRESSURE: 62 MMHG | HEIGHT: 57 IN | BODY MASS INDEX: 23.19 KG/M2 | TEMPERATURE: 98 F | HEART RATE: 74 BPM | OXYGEN SATURATION: 98 %

## 2019-11-29 DIAGNOSIS — N95.2 VAGINAL ATROPHY: ICD-10-CM

## 2019-11-29 DIAGNOSIS — R68.89 DECREASED EXERCISE TOLERANCE: ICD-10-CM

## 2019-11-29 DIAGNOSIS — N76.0 ACUTE VAGINITIS: Primary | ICD-10-CM

## 2019-11-29 DIAGNOSIS — R07.9 CHEST PAIN, UNSPECIFIED TYPE: ICD-10-CM

## 2019-11-29 PROCEDURE — 93000 ELECTROCARDIOGRAM COMPLETE: CPT | Performed by: NURSE PRACTITIONER

## 2019-11-29 PROCEDURE — 87510 GARDNER VAG DNA DIR PROBE: CPT | Performed by: NURSE PRACTITIONER

## 2019-11-29 PROCEDURE — 87660 TRICHOMONAS VAGIN DIR PROBE: CPT | Performed by: NURSE PRACTITIONER

## 2019-11-29 PROCEDURE — 99214 OFFICE O/P EST MOD 30 MIN: CPT | Performed by: NURSE PRACTITIONER

## 2019-11-29 PROCEDURE — 87480 CANDIDA DNA DIR PROBE: CPT | Performed by: NURSE PRACTITIONER

## 2019-11-29 RX ORDER — METRONIDAZOLE 500 MG/1
500 TABLET ORAL 2 TIMES DAILY
Qty: 14 TABLET | Refills: 0 | Status: SHIPPED | OUTPATIENT
Start: 2019-11-29 | End: 2019-12-06

## 2019-11-29 RX ORDER — FLUCONAZOLE 150 MG/1
150 TABLET ORAL ONCE
Qty: 1 TABLET | Refills: 0 | Status: SHIPPED | OUTPATIENT
Start: 2019-11-29 | End: 2019-11-29

## 2019-11-29 NOTE — PROGRESS NOTES
Patient presents with:  Vaginal Problem: pt states she is having vaginal discharge that is thick white. Pt states she has notcie a slight ordor and itchy. Pt states she has been having symptoms for over a week.  Pt does not want any vaginal gels, states the 90 tablet 0      Past Medical History:   Diagnosis Date   • Abdominal pain, right upper quadrant 1/25/2012   • Anxiety state, unspecified    • Biceps muscle tear    • Carpal tunnel syndrome    • Depression    • Dyspareunia    • H/O mammogram 4-10-15    Neg Mother    • Heart Disease Mother    • Other (atherosclerosis, PTCA, pacemaker) Mother    • Other (lupus) Mother    • Other (Unknown) Father    • Ovarian Cancer Sister 61   • Other (Anemia, MI, alcoholic cirrhosis) Other         6 siblings   • Heart Disease Future  - metRONIDAZOLE 500 MG Oral Tab; Take 1 tablet (500 mg total) by mouth 2 (two) times daily for 7 days. Dispense: 14 tablet; Refill: 0  - fluconazole (DIFLUCAN) 150 MG Oral Tab; Take 1 tablet (150 mg total) by mouth once for 1 dose.   Dispense: 1 ta

## 2019-11-29 NOTE — PATIENT INSTRUCTIONS
Start vitamin C daily and probiotic or increase yogurt intake    Bacterial Vaginosis    You have a vaginal infection called bacterial vaginosis (BV). Both good and bad bacteria are present in a healthy vagina.  BV occurs when these bacteria get out of bal When to seek medical advice  Call your healthcare provider right away if:  · You have a fever of 100.4ºF (38ºC) or higher, or as directed by your provider. · Your symptoms worsen, or they don’t go away within a few days of starting treatment.   · You have Talk to your pediatrician regarding the use of this medicine in children. This medicine is not approved for use in children. What side effects may I notice from receiving this medicine?   Side effects that you should report to your doctor or health care pr If you miss a dose, use it as soon as you can. If it is almost time for your next dose, use only that dose. Do not use double or extra doses. Where should I keep my medicine? Keep out of the reach of children.   Store at room temperature between 15 and 30 Tobacco smoking increases the risk of getting a blood clot or having a stroke, especially if you are more than 28years old. You are strongly advised not to smoke.   If you wear contact lenses and notice visual changes, or if the lenses begin to feel uncomf · A heart attack is also known as acute myocardial infarction (AMI). It is what happens when blood can't get to part of the heart muscle. That part of the heart muscle is damaged and starts to die.  If enough of the heart is affected, it will severely limit If you have symptoms that you can’t explain, call 911 right away. Don't drive yourself to the emergency room.  The following are warning signs of a possible heart attack:  · Chest discomfort. Most heart attacks involve discomfort in the center of the chest · If you've had a heart attack. People who have had one heart attack are at risk for having another. Your provider may prescribe medicine such as nitroglycerin to take when chest pain starts.  Or you may need medicines to lower your heart rate and blood pre

## 2019-12-17 ENCOUNTER — OFFICE VISIT (OUTPATIENT)
Dept: INTERNAL MEDICINE CLINIC | Facility: CLINIC | Age: 64
End: 2019-12-17
Payer: MEDICARE

## 2019-12-17 VITALS
HEIGHT: 57 IN | BODY MASS INDEX: 23.84 KG/M2 | DIASTOLIC BLOOD PRESSURE: 60 MMHG | WEIGHT: 110.5 LBS | OXYGEN SATURATION: 99 % | HEART RATE: 66 BPM | SYSTOLIC BLOOD PRESSURE: 124 MMHG | RESPIRATION RATE: 16 BRPM

## 2019-12-17 DIAGNOSIS — R82.998 URINE LEUKOCYTES: ICD-10-CM

## 2019-12-17 DIAGNOSIS — N89.8 VAGINAL ODOR: Primary | ICD-10-CM

## 2019-12-17 DIAGNOSIS — N89.8 VAGINAL ITCHING: ICD-10-CM

## 2019-12-17 PROCEDURE — 87510 GARDNER VAG DNA DIR PROBE: CPT | Performed by: NURSE PRACTITIONER

## 2019-12-17 PROCEDURE — 87086 URINE CULTURE/COLONY COUNT: CPT | Performed by: NURSE PRACTITIONER

## 2019-12-17 PROCEDURE — 87660 TRICHOMONAS VAGIN DIR PROBE: CPT | Performed by: NURSE PRACTITIONER

## 2019-12-17 PROCEDURE — 81003 URINALYSIS AUTO W/O SCOPE: CPT | Performed by: NURSE PRACTITIONER

## 2019-12-17 PROCEDURE — 99213 OFFICE O/P EST LOW 20 MIN: CPT | Performed by: NURSE PRACTITIONER

## 2019-12-17 PROCEDURE — 87480 CANDIDA DNA DIR PROBE: CPT | Performed by: NURSE PRACTITIONER

## 2019-12-17 NOTE — PROGRESS NOTES
Patient presents with:  Vaginal Problem    HPI:   Jero Rodarte is a 62year old female patient presents with c/o clear vaginal discharge, itching, and a \"medication odor\" for the past 2 weeks.  Was treated with Flagyl 2 weeks ago for BV, feels symptom (postoperative nausea and vomiting)    • Rotator cuff syndrome    • Spinal cord tumor     c2 intraspinal tumor with cord compression, neurilemmoma    • Undifferentiated connective tissue disease (HCC)    • Visual impairment     glasses and contacts      Pa 32.00        Pack years: 28        Quit date: 3/20/2003        Years since quittin.7      Smokeless tobacco: Never Used      Tobacco comment: quit in     Alcohol use: No      Alcohol/week: 0.0 standard drinks    Drug use: No      Comment: in her 2 VAGINITIS/VAGINOSIS, DNA PROBE; Future  - URINALYSIS, AUTO, W/O SCOPE    3. Urine leukocytes  - URINE CULTURE, ROUTINE; Future      Patient verbalized understanding and agrees to plan. Patient to follow up if symptoms are not improving.

## 2020-01-17 ENCOUNTER — HOSPITAL ENCOUNTER (OUTPATIENT)
Dept: CV DIAGNOSTICS | Facility: HOSPITAL | Age: 65
Discharge: HOME | End: 2020-01-17
Attending: NURSE PRACTITIONER
Payer: MEDICARE

## 2020-01-17 DIAGNOSIS — R07.9 CHEST PAIN, UNSPECIFIED TYPE: ICD-10-CM

## 2020-01-17 DIAGNOSIS — R68.89 DECREASED EXERCISE TOLERANCE: ICD-10-CM

## 2020-01-17 PROCEDURE — 93018 CV STRESS TEST I&R ONLY: CPT | Performed by: NURSE PRACTITIONER

## 2020-01-17 PROCEDURE — 93017 CV STRESS TEST TRACING ONLY: CPT | Performed by: NURSE PRACTITIONER

## 2020-01-23 ENCOUNTER — HOSPITAL ENCOUNTER (OUTPATIENT)
Age: 65
Discharge: HOME | End: 2020-01-23
Payer: MEDICARE

## 2020-01-23 VITALS
OXYGEN SATURATION: 100 % | RESPIRATION RATE: 20 BRPM | HEART RATE: 74 BPM | SYSTOLIC BLOOD PRESSURE: 151 MMHG | DIASTOLIC BLOOD PRESSURE: 72 MMHG | TEMPERATURE: 99 F

## 2020-01-23 DIAGNOSIS — N30.00 ACUTE CYSTITIS WITHOUT HEMATURIA: Primary | ICD-10-CM

## 2020-01-23 LAB
POCT BILIRUBIN URINE: NEGATIVE
POCT GLUCOSE URINE: NEGATIVE MG/DL
POCT KETONE URINE: NEGATIVE MG/DL
POCT NITRITE URINE: NEGATIVE
POCT PH URINE: 5.5 (ref 5–8)
POCT SPECIFIC GRAVITY URINE: 1.01
POCT URINE COLOR: YELLOW
POCT UROBILINOGEN URINE: 0.2 MG/DL

## 2020-01-23 PROCEDURE — 99214 OFFICE O/P EST MOD 30 MIN: CPT

## 2020-01-23 PROCEDURE — 81002 URINALYSIS NONAUTO W/O SCOPE: CPT | Performed by: PHYSICIAN ASSISTANT

## 2020-01-23 PROCEDURE — 87186 SC STD MICRODIL/AGAR DIL: CPT | Performed by: PHYSICIAN ASSISTANT

## 2020-01-23 PROCEDURE — 87086 URINE CULTURE/COLONY COUNT: CPT | Performed by: PHYSICIAN ASSISTANT

## 2020-01-23 PROCEDURE — 87077 CULTURE AEROBIC IDENTIFY: CPT | Performed by: PHYSICIAN ASSISTANT

## 2020-01-23 RX ORDER — CEPHALEXIN 500 MG/1
500 TABLET ORAL 3 TIMES DAILY
Qty: 15 TABLET | Refills: 0 | Status: SHIPPED | OUTPATIENT
Start: 2020-01-23 | End: 2020-01-28

## 2020-01-23 RX ORDER — FLUCONAZOLE 150 MG/1
150 TABLET ORAL ONCE
Qty: 1 TABLET | Refills: 0 | Status: SHIPPED | OUTPATIENT
Start: 2020-01-23 | End: 2020-01-23

## 2020-01-23 NOTE — ED PROVIDER NOTES
Patient Seen in: [facility]      History   Patient presents with:  Urinary Symptoms    Stated Complaint: urinary issue    HPI    CHIEF COMPLAINT: UTI symptoms    HISTORY OF PRESENT ILLNESS: Patient is a 25-year-old female presen impairment     glasses and contacts              Past Surgical History:   Procedure Laterality Date   • ANESTH,SHOULDER REPLACEMENT Right 05/30/2019    Right reverse total shoulder arthroplasty- Dr. Kj Mustafa   • [address]  2007     cer tumors removed   • C as noted above.     Physical Exam     ED Triage Vitals [01/23/20 0814]   /72   Pulse 74   Resp 20   Temp 98.7 °F (37.1 °C)   Temp src Temporal   SpO2 100 %   O2 Device None (Room air)       Current:/72   Pulse 74   Temp 98.7 °F (37.1 °C) (Tempor emergency basis. Comprehensive verbal and written discharge and follow-up instructions were provided to help prevent relapse or worsening. I discussed the case with the patient and they had no questions, complaints, or concerns.   Patient states they und

## 2020-02-11 ENCOUNTER — OFFICE VISIT (OUTPATIENT)
Dept: INTERNAL MEDICINE CLINIC | Facility: CLINIC | Age: 65
End: 2020-02-11
Payer: MEDICARE

## 2020-02-11 VITALS
OXYGEN SATURATION: 99 % | BODY MASS INDEX: 23.57 KG/M2 | SYSTOLIC BLOOD PRESSURE: 146 MMHG | DIASTOLIC BLOOD PRESSURE: 82 MMHG | HEART RATE: 81 BPM | TEMPERATURE: 98 F | WEIGHT: 109.25 LBS | HEIGHT: 57 IN | RESPIRATION RATE: 16 BRPM

## 2020-02-11 DIAGNOSIS — R03.0 ELEVATED BLOOD PRESSURE READING: ICD-10-CM

## 2020-02-11 DIAGNOSIS — N89.8 VAGINAL DISCHARGE: Primary | ICD-10-CM

## 2020-02-11 PROCEDURE — 87660 TRICHOMONAS VAGIN DIR PROBE: CPT | Performed by: PHYSICIAN ASSISTANT

## 2020-02-11 PROCEDURE — 99214 OFFICE O/P EST MOD 30 MIN: CPT | Performed by: PHYSICIAN ASSISTANT

## 2020-02-11 PROCEDURE — 87510 GARDNER VAG DNA DIR PROBE: CPT | Performed by: PHYSICIAN ASSISTANT

## 2020-02-11 PROCEDURE — 87480 CANDIDA DNA DIR PROBE: CPT | Performed by: PHYSICIAN ASSISTANT

## 2020-02-11 NOTE — PATIENT INSTRUCTIONS
If swab is positive for BV:  - start oral metronidazole - 1 tablet twice a day x 7 days  - then start metronidazole gel - applied vaginally twice a week x 4 months    Please start an OTC probiotic. Wear cotton underwear and avoid tight fitting pants.

## 2020-02-11 NOTE — PROGRESS NOTES
HPI:  Sarah Nunez is a 62year old female who presents for vaginal discharge x 2 months. C/o creamy white discharge and mild odor. Denies fever, chills, vaginal itching, dysuria, hematuria, urinary urgency or frequency.   Had three documented episode Surgical History:   Procedure Laterality Date   • ANESTH,SHOULDER REPLACEMENT Right 05/30/2019    Right reverse total shoulder arthroplasty- Dr. Jim Chang   • [address]  2007     cer tumors removed   • CERVICAL FACET INJECTION Left 12/8/2016    Performed by Breastfeeding No   BMI 23.64 kg/m²   GENERAL: A&O, well developed, well nourished, in no apparent distress  LUNGS: clear to auscultation  CARDIO: RRR without murmur  GI: soft, non-distended, +mild RLQ tenderness, no guarding or rebound tenderness, BS prese

## 2020-02-12 RX ORDER — METRONIDAZOLE 7.5 MG/G
GEL VAGINAL
Qty: 1 TUBE | Refills: 3 | Status: SHIPPED | OUTPATIENT
Start: 2020-02-12 | End: 2020-04-29

## 2020-02-12 RX ORDER — METRONIDAZOLE 500 MG/1
500 TABLET ORAL 2 TIMES DAILY
Qty: 14 TABLET | Refills: 0 | Status: SHIPPED | OUTPATIENT
Start: 2020-02-12 | End: 2020-02-19

## 2020-02-12 RX ORDER — FLUCONAZOLE 150 MG/1
TABLET ORAL
Qty: 8 TABLET | Refills: 3 | Status: SHIPPED | OUTPATIENT
Start: 2020-02-12 | End: 2020-04-29

## 2020-03-16 ENCOUNTER — TELEPHONE (OUTPATIENT)
Dept: INTERNAL MEDICINE CLINIC | Facility: CLINIC | Age: 65
End: 2020-03-16

## 2020-03-16 RX ORDER — CLINDAMYCIN HYDROCHLORIDE 300 MG/1
300 CAPSULE ORAL 2 TIMES DAILY
Qty: 14 CAPSULE | Refills: 0 | Status: SHIPPED | OUTPATIENT
Start: 2020-03-16 | End: 2020-04-10

## 2020-03-16 NOTE — TELEPHONE ENCOUNTER
Spoke with patient re: scheduled appt for 3/17/20. C/o ongoing vaginal discharge. Vaginal swab from 2/11/20 was positive for BV. Hx of recurrent BV infection.   She was treated at that time with PO Flagyl and has been doing twice weekly metronidazole g

## 2020-03-27 PROBLEM — Z96.611 HX OF TOTAL SHOULDER REPLACEMENT, RIGHT: Status: ACTIVE | Noted: 2020-03-27

## 2020-03-27 PROBLEM — M19.012 ARTHRITIS OF LEFT GLENOHUMERAL JOINT: Status: ACTIVE | Noted: 2020-03-27

## 2020-04-08 ENCOUNTER — TELEPHONE (OUTPATIENT)
Dept: INTERNAL MEDICINE CLINIC | Facility: CLINIC | Age: 65
End: 2020-04-08

## 2020-04-08 NOTE — TELEPHONE ENCOUNTER
Appointment scheduled    Future Appointments   Date Time Provider Andrews Randhawa   4/10/2020 10:00 AM CONSUELO Real [facility] [place]     Patient gave verbal consent for virtual telephone visit.  Patient understands charges will apply for these ser

## 2020-04-08 NOTE — TELEPHONE ENCOUNTER
Pt would like a call from nurse say she still has a vaginal bacteria infection and last antibiotic did not work

## 2020-04-08 NOTE — TELEPHONE ENCOUNTER
Please schedule pt for TELEPHONE VISIT on Friday, 4/10 between 10 and 3 and notify pt that she will receive a call at that time.

## 2020-04-10 ENCOUNTER — VIRTUAL PHONE E/M (OUTPATIENT)
Dept: INTERNAL MEDICINE CLINIC | Facility: CLINIC | Age: 65
End: 2020-04-10
Payer: MEDICARE

## 2020-04-10 DIAGNOSIS — B96.89 BACTERIAL VAGINOSIS: ICD-10-CM

## 2020-04-10 DIAGNOSIS — N76.0 BACTERIAL VAGINOSIS: ICD-10-CM

## 2020-04-10 DIAGNOSIS — N89.8 VAGINAL DISCHARGE: Primary | ICD-10-CM

## 2020-04-10 PROCEDURE — 99442 PHONE E/M BY PHYS 11-20 MIN: CPT | Performed by: PHYSICIAN ASSISTANT

## 2020-04-10 RX ORDER — CLINDAMYCIN HYDROCHLORIDE 300 MG/1
300 CAPSULE ORAL 2 TIMES DAILY
Qty: 14 CAPSULE | Refills: 0 | Status: SHIPPED | OUTPATIENT
Start: 2020-04-10 | End: 2020-04-17

## 2020-04-28 ENCOUNTER — TELEPHONE (OUTPATIENT)
Dept: INTERNAL MEDICINE CLINIC | Facility: CLINIC | Age: 65
End: 2020-04-28

## 2020-04-28 NOTE — TELEPHONE ENCOUNTER
Pt would like a call back from office says that Kimberly Rowan asked her to call the office to let her know what her next step should be seance antibiotic did not work

## 2020-04-28 NOTE — TELEPHONE ENCOUNTER
Pt given information for dr Hussain Davis group If unable to get un to call back for appt with LL on Friday

## 2020-04-28 NOTE — TELEPHONE ENCOUNTER
Please have her call Dr. Junior Espitia office to see if she can get an earlier appt or schedule with [facility] (Dr. Joes Calderon or one of her partners).   If unable to get a physical appt with either of them in the next week then please schedule her with me (in office

## 2020-04-28 NOTE — TELEPHONE ENCOUNTER
Pt calling with updated regarding vaginal discharge. Pt states she finished all medications and \"they did not work\". C/o yellow vaginal discharge, vaginal itching, bilateral flank pain.      Denies fever, abdominal pain, dysuria, hematuria, urinary cheng

## 2020-04-29 ENCOUNTER — OFFICE VISIT (OUTPATIENT)
Dept: OBGYN CLINIC | Facility: CLINIC | Age: 65
End: 2020-04-29
Payer: MEDICARE

## 2020-04-29 ENCOUNTER — TELEPHONE (OUTPATIENT)
Dept: OBGYN CLINIC | Facility: CLINIC | Age: 65
End: 2020-04-29

## 2020-04-29 VITALS
DIASTOLIC BLOOD PRESSURE: 64 MMHG | TEMPERATURE: 99 F | HEIGHT: 58 IN | WEIGHT: 117 LBS | HEART RATE: 68 BPM | SYSTOLIC BLOOD PRESSURE: 110 MMHG | BODY MASS INDEX: 24.56 KG/M2

## 2020-04-29 DIAGNOSIS — Z11.3 SCREEN FOR STD (SEXUALLY TRANSMITTED DISEASE): ICD-10-CM

## 2020-04-29 DIAGNOSIS — N89.8 VAGINAL DISCHARGE: Primary | ICD-10-CM

## 2020-04-29 DIAGNOSIS — B96.89 BV (BACTERIAL VAGINOSIS): ICD-10-CM

## 2020-04-29 DIAGNOSIS — R10.2 PELVIC PAIN: ICD-10-CM

## 2020-04-29 DIAGNOSIS — N76.0 BV (BACTERIAL VAGINOSIS): ICD-10-CM

## 2020-04-29 PROCEDURE — 87591 N.GONORRHOEAE DNA AMP PROB: CPT | Performed by: OBSTETRICS & GYNECOLOGY

## 2020-04-29 PROCEDURE — 87660 TRICHOMONAS VAGIN DIR PROBE: CPT | Performed by: OBSTETRICS & GYNECOLOGY

## 2020-04-29 PROCEDURE — 87480 CANDIDA DNA DIR PROBE: CPT | Performed by: OBSTETRICS & GYNECOLOGY

## 2020-04-29 PROCEDURE — 99203 OFFICE O/P NEW LOW 30 MIN: CPT | Performed by: OBSTETRICS & GYNECOLOGY

## 2020-04-29 PROCEDURE — 87510 GARDNER VAG DNA DIR PROBE: CPT | Performed by: OBSTETRICS & GYNECOLOGY

## 2020-04-29 PROCEDURE — 87491 CHLMYD TRACH DNA AMP PROBE: CPT | Performed by: OBSTETRICS & GYNECOLOGY

## 2020-04-29 NOTE — TELEPHONE ENCOUNTER
PC with patient. Had been prior treated for BV with another provider. Still has symptoms. Yellowish discharge with odor. States not fishy. Needs appointment to evaluate. Scheduled for today.

## 2020-04-29 NOTE — PROGRESS NOTES
Pt has been treated for was thought to be BV She continues to have a yellowish color discharge with no odor.

## 2020-04-29 NOTE — TELEPHONE ENCOUNTER
PT c/o vaginal discharge, yellow in color with no odor since November 2019. Also states she has moderate lower back pain. Would like an appointment as nothing has resolved. Please advise.

## 2020-04-30 RX ORDER — METRONIDAZOLE 500 MG/1
500 TABLET ORAL 2 TIMES DAILY
Qty: 14 TABLET | Refills: 0 | Status: SHIPPED | OUTPATIENT
Start: 2020-04-30 | End: 2020-05-07

## 2020-04-30 RX ORDER — METRONIDAZOLE 7.5 MG/G
1 GEL VAGINAL WEEKLY
Qty: 1 TUBE | Refills: 1 | Status: SHIPPED | OUTPATIENT
Start: 2020-04-30 | End: 2021-02-16 | Stop reason: ALTCHOICE

## 2020-04-30 NOTE — PROGRESS NOTES
HPI:    Patient ID: Chiquis Coello is a 62year old female. HPI  Patient seen with chief complaint of persistent vaginal discharge. She has been treated for yeast and BV repeatedly since October 2019. She is occasionally sexually active.   She is S/P Physical Exam   Constitutional: She is oriented to person, place, and time. She appears well-developed and well-nourished. Neck: Neck supple. No thyromegaly present. Cardiovascular: Normal rate and regular rhythm. Abdominal: Soft.  Bowel sounds are

## 2020-05-13 ENCOUNTER — OFFICE VISIT (OUTPATIENT)
Dept: OBGYN CLINIC | Facility: CLINIC | Age: 65
End: 2020-05-13
Payer: MEDICARE

## 2020-05-13 VITALS
WEIGHT: 115 LBS | BODY MASS INDEX: 24.14 KG/M2 | DIASTOLIC BLOOD PRESSURE: 72 MMHG | TEMPERATURE: 98 F | SYSTOLIC BLOOD PRESSURE: 110 MMHG | HEIGHT: 58 IN | RESPIRATION RATE: 12 BRPM | HEART RATE: 68 BPM

## 2020-05-13 DIAGNOSIS — N89.8 VAGINAL ITCHING: Primary | ICD-10-CM

## 2020-05-13 DIAGNOSIS — N76.0 BV (BACTERIAL VAGINOSIS): ICD-10-CM

## 2020-05-13 DIAGNOSIS — B96.89 BV (BACTERIAL VAGINOSIS): ICD-10-CM

## 2020-05-13 DIAGNOSIS — N89.8 VAGINAL DISCHARGE: ICD-10-CM

## 2020-05-13 PROCEDURE — 87660 TRICHOMONAS VAGIN DIR PROBE: CPT | Performed by: OBSTETRICS & GYNECOLOGY

## 2020-05-13 PROCEDURE — 99213 OFFICE O/P EST LOW 20 MIN: CPT | Performed by: OBSTETRICS & GYNECOLOGY

## 2020-05-13 PROCEDURE — 87510 GARDNER VAG DNA DIR PROBE: CPT | Performed by: OBSTETRICS & GYNECOLOGY

## 2020-05-13 PROCEDURE — 87480 CANDIDA DNA DIR PROBE: CPT | Performed by: OBSTETRICS & GYNECOLOGY

## 2020-05-13 NOTE — PROGRESS NOTES
HPI:    Patient ID: Sander Smalls is a 62year old female. HPI  Patient has a longstanding history of recurrent BV. She was seen on April 29, 2020 and culture was significant for BV. She was given prescription for p.o.  Flagyl for 7 days and to take distribution, no lesions or ulcerations  vagina     no abnormal discharge, no lesions or ulcerations    adnexa     no masses, NT         ASSESSMENT/PLAN:   Vaginal discharge  Vaginal itching  (primary encounter diagnosis)  Bv (bacterial vaginosis)    No or

## 2020-05-13 NOTE — PROGRESS NOTES
Pt here for follow up and re-culture. She states she tried using the boric acid and it seemed to make things worse. She is still experiencing itching.

## 2020-05-19 ENCOUNTER — TELEPHONE (OUTPATIENT)
Dept: OBGYN CLINIC | Facility: CLINIC | Age: 65
End: 2020-05-19

## 2020-05-19 NOTE — TELEPHONE ENCOUNTER
Patient feels her infection has not gone away, she still has a yellow discharge She is going to start the borax acid again.

## 2020-05-19 NOTE — TELEPHONE ENCOUNTER
Patient states her vaginal discharge is back. No itching, no irritation, just yellow discharge. Patient states she will try taking boric acid as instructed per Dr. Becca Pantoja. Patient instructed to call office if no improvement in symptoms.  Patient verbalizes un

## 2020-06-19 ENCOUNTER — OFFICE VISIT (OUTPATIENT)
Dept: OBGYN CLINIC | Facility: CLINIC | Age: 65
End: 2020-06-19
Payer: MEDICARE

## 2020-06-19 VITALS
WEIGHT: 110 LBS | TEMPERATURE: 98 F | HEART RATE: 80 BPM | DIASTOLIC BLOOD PRESSURE: 70 MMHG | RESPIRATION RATE: 12 BRPM | HEIGHT: 59 IN | SYSTOLIC BLOOD PRESSURE: 110 MMHG | BODY MASS INDEX: 22.18 KG/M2

## 2020-06-19 DIAGNOSIS — R35.0 URINARY FREQUENCY: ICD-10-CM

## 2020-06-19 DIAGNOSIS — N95.2 VAGINAL ATROPHY: ICD-10-CM

## 2020-06-19 DIAGNOSIS — R30.0 DYSURIA: Primary | ICD-10-CM

## 2020-06-19 DIAGNOSIS — N89.8 VAGINAL IRRITATION: ICD-10-CM

## 2020-06-19 PROCEDURE — 87186 SC STD MICRODIL/AGAR DIL: CPT | Performed by: OBSTETRICS & GYNECOLOGY

## 2020-06-19 PROCEDURE — 87086 URINE CULTURE/COLONY COUNT: CPT | Performed by: OBSTETRICS & GYNECOLOGY

## 2020-06-19 PROCEDURE — 87510 GARDNER VAG DNA DIR PROBE: CPT | Performed by: OBSTETRICS & GYNECOLOGY

## 2020-06-19 PROCEDURE — 87480 CANDIDA DNA DIR PROBE: CPT | Performed by: OBSTETRICS & GYNECOLOGY

## 2020-06-19 PROCEDURE — 87088 URINE BACTERIA CULTURE: CPT | Performed by: OBSTETRICS & GYNECOLOGY

## 2020-06-19 PROCEDURE — 87660 TRICHOMONAS VAGIN DIR PROBE: CPT | Performed by: OBSTETRICS & GYNECOLOGY

## 2020-06-19 PROCEDURE — 81002 URINALYSIS NONAUTO W/O SCOPE: CPT | Performed by: OBSTETRICS & GYNECOLOGY

## 2020-06-19 PROCEDURE — 99213 OFFICE O/P EST LOW 20 MIN: CPT | Performed by: OBSTETRICS & GYNECOLOGY

## 2020-06-19 RX ORDER — SULFAMETHOXAZOLE AND TRIMETHOPRIM 800; 160 MG/1; MG/1
1 TABLET ORAL 2 TIMES DAILY
Qty: 14 TABLET | Refills: 0 | Status: SHIPPED | OUTPATIENT
Start: 2020-06-19 | End: 2020-06-26

## 2020-06-19 RX ORDER — NITROFURANTOIN 25; 75 MG/1; MG/1
100 CAPSULE ORAL 2 TIMES DAILY
Qty: 14 CAPSULE | Refills: 0 | Status: SHIPPED | OUTPATIENT
Start: 2020-06-19 | End: 2020-06-19

## 2020-06-19 NOTE — PROGRESS NOTES
CHIEF COMPLAINT:   Patient presents with urinary frequency, dysuria and recurrent BV  HPI:   Toni Parents is a 62year old  No LMP recorded. Patient has had a hysterectomy.  who presents with urinary frequency with dysuria for the past several da URINE CULTURE, ROUTINE; Future    2. Urinary frequency      3. Vaginal irritation  Continue maintenance dose for BV prevention. Affirm done    Bactrim DS 1 twice daily for 7 days. 4) vaginal atrophy:  Premarin cream 0.5 g twice a week.   Patient state

## 2020-06-24 DIAGNOSIS — R10.2 PELVIC PAIN: Primary | ICD-10-CM

## 2020-07-01 ENCOUNTER — ULTRASOUND ENCOUNTER (OUTPATIENT)
Dept: OBGYN CLINIC | Facility: CLINIC | Age: 65
End: 2020-07-01
Payer: MEDICARE

## 2020-07-01 DIAGNOSIS — R10.31 ABDOMINAL PAIN, RIGHT LOWER QUADRANT: Primary | ICD-10-CM

## 2020-09-23 ENCOUNTER — TELEPHONE (OUTPATIENT)
Dept: INTERNAL MEDICINE CLINIC | Facility: CLINIC | Age: 65
End: 2020-09-23

## 2020-09-23 DIAGNOSIS — Z12.31 ENCOUNTER FOR SCREENING MAMMOGRAM FOR MALIGNANT NEOPLASM OF BREAST: Primary | ICD-10-CM

## 2020-09-23 NOTE — TELEPHONE ENCOUNTER
Orders pending. CONCLUSION:   Benign findings. DIAGNOSTIC CATEGORY 2--BENIGN FINDING:       RECOMMENDATIONS:    ROUTINE MAMMOGRAM AND CLINICAL EVALUATION IN 12 MONTHS.              A letter explaining the results in lay terms has been sent to the pat

## 2020-09-23 NOTE — TELEPHONE ENCOUNTER
Patient has upcoming appointment for MA supervisit.  Requesting order for mammogram to be placed prior to appointment    Future Appointments   Date Time Provider Andrews Randhawa   10/23/2020 11:00 AM Estee Ward MD [facility] [place]

## 2020-10-01 ENCOUNTER — HOSPITAL ENCOUNTER (OUTPATIENT)
Dept: MRI IMAGING | Facility: HOSPITAL | Age: 65
Discharge: HOME | End: 2020-10-01
Attending: INTERNAL MEDICINE
Payer: MEDICARE

## 2020-10-01 ENCOUNTER — HOSPITAL ENCOUNTER (OUTPATIENT)
Dept: BONE DENSITY | Age: 65
Discharge: HOME | End: 2020-10-01
Attending: INTERNAL MEDICINE
Payer: MEDICARE

## 2020-10-01 DIAGNOSIS — M35.9 UNDIFFERENTIATED CONNECTIVE TISSUE DISEASE (HCC): ICD-10-CM

## 2020-10-01 DIAGNOSIS — R93.89 ABNORMAL X-RAY: ICD-10-CM

## 2020-10-01 DIAGNOSIS — M85.80 OSTEOPENIA, UNSPECIFIED LOCATION: ICD-10-CM

## 2020-10-01 DIAGNOSIS — M06.4 INFLAMMATORY POLYARTHRITIS (HCC): ICD-10-CM

## 2020-10-01 PROCEDURE — A9575 INJ GADOTERATE MEGLUMI 0.1ML: HCPCS

## 2020-10-01 PROCEDURE — 77080 DXA BONE DENSITY AXIAL: CPT | Performed by: INTERNAL MEDICINE

## 2020-10-01 PROCEDURE — 73223 MRI JOINT UPR EXTR W/O&W/DYE: CPT | Performed by: INTERNAL MEDICINE

## 2020-10-09 NOTE — PROGRESS NOTES
Please call patient (see telephone encounter): There is area of loss of blood supply to the left shoulder.  She needs to follow up with Ortho for this  There is tendonitis, and thinning of the cartilage in the shoulder

## 2020-10-23 ENCOUNTER — OFFICE VISIT (OUTPATIENT)
Dept: INTERNAL MEDICINE CLINIC | Facility: CLINIC | Age: 65
End: 2020-10-23
Payer: MEDICARE

## 2020-10-23 VITALS
SYSTOLIC BLOOD PRESSURE: 108 MMHG | WEIGHT: 107 LBS | HEIGHT: 59 IN | DIASTOLIC BLOOD PRESSURE: 58 MMHG | OXYGEN SATURATION: 98 % | RESPIRATION RATE: 16 BRPM | HEART RATE: 88 BPM | TEMPERATURE: 98 F | BODY MASS INDEX: 21.57 KG/M2

## 2020-10-23 DIAGNOSIS — M47.812 CERVICAL SPONDYLOSIS WITHOUT MYELOPATHY: ICD-10-CM

## 2020-10-23 DIAGNOSIS — N76.0 RECURRENT VAGINITIS: ICD-10-CM

## 2020-10-23 DIAGNOSIS — G89.29 CHRONIC PAIN OF BOTH SHOULDERS: ICD-10-CM

## 2020-10-23 DIAGNOSIS — M25.512 CHRONIC PAIN OF BOTH SHOULDERS: ICD-10-CM

## 2020-10-23 DIAGNOSIS — M19.012 ARTHRITIS OF LEFT GLENOHUMERAL JOINT: ICD-10-CM

## 2020-10-23 DIAGNOSIS — E55.9 VITAMIN D DEFICIENCY: ICD-10-CM

## 2020-10-23 DIAGNOSIS — M35.9 UNDIFFERENTIATED CONNECTIVE TISSUE DISEASE (HCC): ICD-10-CM

## 2020-10-23 DIAGNOSIS — Z00.00 ROUTINE GENERAL MEDICAL EXAMINATION AT A HEALTH CARE FACILITY: Primary | ICD-10-CM

## 2020-10-23 DIAGNOSIS — Z12.31 VISIT FOR SCREENING MAMMOGRAM: ICD-10-CM

## 2020-10-23 DIAGNOSIS — M06.4 POLYARTHRITIS, INFLAMMATORY (HCC): ICD-10-CM

## 2020-10-23 DIAGNOSIS — M25.511 CHRONIC PAIN OF BOTH SHOULDERS: ICD-10-CM

## 2020-10-23 DIAGNOSIS — M85.89 OSTEOPENIA OF MULTIPLE SITES: ICD-10-CM

## 2020-10-23 PROCEDURE — 3008F BODY MASS INDEX DOCD: CPT | Performed by: INTERNAL MEDICINE

## 2020-10-23 PROCEDURE — 3078F DIAST BP <80 MM HG: CPT | Performed by: INTERNAL MEDICINE

## 2020-10-23 PROCEDURE — 96160 PT-FOCUSED HLTH RISK ASSMT: CPT | Performed by: INTERNAL MEDICINE

## 2020-10-23 PROCEDURE — 99396 PREV VISIT EST AGE 40-64: CPT | Performed by: INTERNAL MEDICINE

## 2020-10-23 PROCEDURE — G0439 PPPS, SUBSEQ VISIT: HCPCS | Performed by: INTERNAL MEDICINE

## 2020-10-23 PROCEDURE — 3074F SYST BP LT 130 MM HG: CPT | Performed by: INTERNAL MEDICINE

## 2020-10-23 NOTE — PATIENT INSTRUCTIONS
Please start with daily vitamin D3 2000 units daily. Please take calcium 600 mg daily. You are due for your colonoscopy in March, 2021. I also advise you get the shingrix vaccine once in a lifetime.  This is NEW and considered better than the previous

## 2020-10-23 NOTE — PROGRESS NOTES
Marilynn Stark is a 60year old female. HPI:   Patient presents for a medicare supervisit and additional issues noted below. 1. Glenohumeral joint arthritis in the left: cortisone injection helped greatly  2.  Inflammatory Polyarthritis and UCTD: st Encounters:  10/23/20 : 107 lb (48.5 kg)  09/25/20 : 109 lb (49.4 kg)  06/19/20 : 110 lb (49.9 kg)  05/13/20 : 115 lb (52.2 kg)  04/29/20 : 117 lb (53.1 kg)  02/11/20 : 109 lb 4 oz (49.6 kg)        Grass                   HIVES  Fentanyl                CLARA straighten out 4th toe   • OTHER SURGICAL HISTORY  04/2016    Cervical spine fusion   • REPAIR ROTATOR CUFF,CHRONIC  8544,7389    (bilateral)   • REVISE MEDIAN N/CARPAL TUNNEL SURG  2000   • SHOULDER TOTAL Right 5/30/2019    Performed by Luz Dunham metronidazole for 1 month even though it was advised to be used 3 months. Denies symptoms today. # Insomnia 2/2 Pain: see management of pain. -  sleep hygiene, ambien, and trazodone, have not helped.  Treating the underlying issue (pain) is the ultimate hemorrhoids, repeat in 2021. Cervical Cancer Screening: s/p [facility]  Breast Cancer Screening: cont yearly mammogram  Bone Health: see above.    Lung Cancer Screening: (54 to 74 years, a history of smoking at least 30 pack-years and, if a former smoker, had [place]

## 2020-11-03 ENCOUNTER — OFFICE VISIT (OUTPATIENT)
Dept: PODIATRY CLINIC | Facility: CLINIC | Age: 65
End: 2020-11-03
Payer: MEDICARE

## 2020-11-03 DIAGNOSIS — M19.071 PRIMARY OSTEOARTHRITIS OF BOTH FEET: ICD-10-CM

## 2020-11-03 DIAGNOSIS — M72.2 PLANTAR FASCIITIS: Primary | ICD-10-CM

## 2020-11-03 DIAGNOSIS — B35.1 DERMATOPHYTOSIS OF NAIL: ICD-10-CM

## 2020-11-03 DIAGNOSIS — M19.072 PRIMARY OSTEOARTHRITIS OF BOTH FEET: ICD-10-CM

## 2020-11-03 PROCEDURE — 99213 OFFICE O/P EST LOW 20 MIN: CPT | Performed by: PODIATRIST

## 2020-11-03 PROCEDURE — L4397 STATIC OR DYNAMI AFO PRE OTS: HCPCS | Performed by: PODIATRIST

## 2020-11-03 NOTE — PROGRESS NOTES
Brigid Tanner is a 60year old female. Patient presents with: Foot Pain: bilateral foot pain - left is worst then right - pain scale 10/10 - taking ibuprofen as needed.         HPI:     Patient presents today with bilateral foot pain left worse than rig Visual impairment     glasses and contacts      Past Surgical History:   Procedure Laterality Date   • ANESTH,SHOULDER REPLACEMENT Right 05/30/2019    Right reverse total shoulder arthroplasty- Dr. Manzo April   • [address]  2007     cer tumors removed   • CE Smoking status: Former Smoker        Packs/day: 1.00        Years: 32.00        Pack years: 28        Quit date: 3/20/2003        Years since quittin.6      Smokeless tobacco: Never Used      Tobacco comment: quit in     Substance and Sexual Activ osteoarthritis of both feet  -     PHYSICAL THERAPY EXTERNAL        Plan: Today were going to initiate physical therapy night splints were dispensed and we can have her do stretching exercises at home and will reevaluate her when she completes her therapy.

## 2020-12-08 ENCOUNTER — OFFICE VISIT (OUTPATIENT)
Dept: PODIATRY CLINIC | Facility: CLINIC | Age: 65
End: 2020-12-08
Payer: MEDICARE

## 2020-12-08 DIAGNOSIS — M72.2 PLANTAR FASCIITIS: Primary | ICD-10-CM

## 2020-12-08 DIAGNOSIS — M19.072 PRIMARY OSTEOARTHRITIS OF BOTH FEET: ICD-10-CM

## 2020-12-08 DIAGNOSIS — M19.071 PRIMARY OSTEOARTHRITIS OF BOTH FEET: ICD-10-CM

## 2020-12-08 PROCEDURE — 99213 OFFICE O/P EST LOW 20 MIN: CPT | Performed by: PODIATRIST

## 2020-12-08 NOTE — PROGRESS NOTES
Dianna Rosa is a 60year old female. Patient presents with: Follow - Up: patient has been doing exercises at home and wearing night splint at night  - pain scale 5/10.         HPI:     Today for follow-up evaluation her pain and discomfort has improve ANESTH,SHOULDER REPLACEMENT Right 05/30/2019    Right reverse total shoulder arthroplasty- Dr. Ada Polanco   • [address]  2007     cer tumors removed   • CERVICAL FACET INJECTION Left 12/8/2016    Performed by Bhavana Banda MD at [facility] MA[facility] OR   • CERVICAL F Quit date: 3/20/2003        Years since quittin.7      Smokeless tobacco: Never Used      Tobacco comment: quit in     Substance and Sexual Activity      Alcohol use: No        Alcohol/week: 0.0 standard drinks      Drug use: No        Comment: in for further evaluation    The patient indicates understanding of these issues and agrees to the plan. Return if symptoms worsen or fail to improve.     Alvaro Bower DPM  12/8/2020

## 2020-12-21 ENCOUNTER — HOSPITAL ENCOUNTER (OUTPATIENT)
Dept: MAMMOGRAPHY | Age: 65
Discharge: HOME | End: 2020-12-21
Attending: INTERNAL MEDICINE
Payer: MEDICARE

## 2020-12-21 DIAGNOSIS — Z12.31 VISIT FOR SCREENING MAMMOGRAM: ICD-10-CM

## 2020-12-21 PROCEDURE — 77067 SCR MAMMO BI INCL CAD: CPT | Performed by: INTERNAL MEDICINE

## 2020-12-21 PROCEDURE — 77063 BREAST TOMOSYNTHESIS BI: CPT | Performed by: INTERNAL MEDICINE

## 2021-01-07 ENCOUNTER — OFFICE VISIT (OUTPATIENT)
Dept: PODIATRY CLINIC | Facility: CLINIC | Age: 66
End: 2021-01-07
Payer: MEDICARE

## 2021-01-07 VITALS — SYSTOLIC BLOOD PRESSURE: 130 MMHG | DIASTOLIC BLOOD PRESSURE: 72 MMHG

## 2021-01-07 DIAGNOSIS — M19.072 PRIMARY OSTEOARTHRITIS OF BOTH FEET: Primary | ICD-10-CM

## 2021-01-07 DIAGNOSIS — M20.42 HAMMER TOES OF BOTH FEET: ICD-10-CM

## 2021-01-07 DIAGNOSIS — M20.41 HAMMER TOES OF BOTH FEET: ICD-10-CM

## 2021-01-07 DIAGNOSIS — M77.50 CAPSULITIS OF METATARSOPHALANGEAL (MTP) JOINT: ICD-10-CM

## 2021-01-07 DIAGNOSIS — M20.5X9 HALLUX LIMITUS, UNSPECIFIED LATERALITY: ICD-10-CM

## 2021-01-07 DIAGNOSIS — M19.071 PRIMARY OSTEOARTHRITIS OF BOTH FEET: Primary | ICD-10-CM

## 2021-01-07 DIAGNOSIS — S99.929S: ICD-10-CM

## 2021-01-07 PROCEDURE — 3078F DIAST BP <80 MM HG: CPT | Performed by: PODIATRIST

## 2021-01-07 PROCEDURE — 3075F SYST BP GE 130 - 139MM HG: CPT | Performed by: PODIATRIST

## 2021-01-07 PROCEDURE — 20604 DRAIN/INJ JOINT/BURSA W/US: CPT | Performed by: PODIATRIST

## 2021-01-07 RX ORDER — MELOXICAM 15 MG/1
15 TABLET ORAL DAILY
Qty: 30 TABLET | Refills: 1 | Status: SHIPPED | OUTPATIENT
Start: 2021-01-07 | End: 2021-02-16

## 2021-01-07 RX ORDER — DEXAMETHASONE SODIUM PHOSPHATE 4 MG/ML
2 INJECTION, SOLUTION INTRA-ARTICULAR; INTRALESIONAL; INTRAMUSCULAR; INTRAVENOUS; SOFT TISSUE ONCE
Status: COMPLETED | OUTPATIENT
Start: 2021-01-07 | End: 2021-01-07

## 2021-01-07 RX ADMIN — DEXAMETHASONE SODIUM PHOSPHATE 2 MG: 4 INJECTION, SOLUTION INTRA-ARTICULAR; INTRALESIONAL; INTRAMUSCULAR; INTRAVENOUS; SOFT TISSUE at 15:00:00

## 2021-01-07 NOTE — PROCEDURES
Per Dr Cage Pod to draw up .5ml of dexamethasone sodium phosphate, .5ml kenalog-10 and .5ml sesorcaine 0.5% for a right foot injection. Pt had left office before I could obtain post injection vitals.

## 2021-01-08 NOTE — PROGRESS NOTES
Shemar Negron is a 60year old female. Patient presents with: Foot Pain: bilateral foot pain - right worst then left - pain scale 6/10.         HPI:     Presents today for evaluation and management of her bilateral foot complaint she has pain in the bal Osteoporosis    • PONV (postoperative nausea and vomiting)    • Rotator cuff syndrome    • Spinal cord tumor     c2 intraspinal tumor with cord compression, neurilemmoma    • Undifferentiated connective tissue disease (HCC)    • Visual impairment     glass Number of children: 1      Years of education: Not on file      Highest education level: Not on file    Occupational History      Occupation: Homemaker        Comment: Off work since 2007 due to disability    Tobacco Use      Smoking status: Former Smoke pain and discomfort in both great toe joints she has a recurrent lesion in between the fourth and fifth toes heloma molle.   Her main problems today are the heloma molle as well as the pain in the second MP joint on her right foot        ASSESSMENT AND PLAN plan.    No follow-ups on file.     Sushil Talamantes DPM  1/8/2021

## 2021-02-16 ENCOUNTER — OFFICE VISIT (OUTPATIENT)
Dept: INTERNAL MEDICINE CLINIC | Facility: CLINIC | Age: 66
End: 2021-02-16
Payer: MEDICARE

## 2021-02-16 VITALS
SYSTOLIC BLOOD PRESSURE: 140 MMHG | HEART RATE: 77 BPM | OXYGEN SATURATION: 99 % | WEIGHT: 108.5 LBS | HEIGHT: 57 IN | TEMPERATURE: 98 F | DIASTOLIC BLOOD PRESSURE: 80 MMHG | BODY MASS INDEX: 23.41 KG/M2 | RESPIRATION RATE: 12 BRPM

## 2021-02-16 DIAGNOSIS — R03.0 ELEVATED BLOOD PRESSURE READING IN OFFICE WITHOUT DIAGNOSIS OF HYPERTENSION: ICD-10-CM

## 2021-02-16 DIAGNOSIS — Z01.89 ENCOUNTER FOR LABORATORY TEST: ICD-10-CM

## 2021-02-16 DIAGNOSIS — R22.9 SUBCUTANEOUS NODULE: Primary | ICD-10-CM

## 2021-02-16 DIAGNOSIS — R07.9 CHEST PAIN, UNSPECIFIED TYPE: ICD-10-CM

## 2021-02-16 DIAGNOSIS — R00.2 PALPITATIONS: ICD-10-CM

## 2021-02-16 DIAGNOSIS — N89.8 VAGINAL DISCHARGE: ICD-10-CM

## 2021-02-16 PROCEDURE — 87480 CANDIDA DNA DIR PROBE: CPT | Performed by: NURSE PRACTITIONER

## 2021-02-16 PROCEDURE — 87510 GARDNER VAG DNA DIR PROBE: CPT | Performed by: NURSE PRACTITIONER

## 2021-02-16 PROCEDURE — 87660 TRICHOMONAS VAGIN DIR PROBE: CPT | Performed by: NURSE PRACTITIONER

## 2021-02-16 PROCEDURE — 3079F DIAST BP 80-89 MM HG: CPT | Performed by: NURSE PRACTITIONER

## 2021-02-16 PROCEDURE — 99214 OFFICE O/P EST MOD 30 MIN: CPT | Performed by: NURSE PRACTITIONER

## 2021-02-16 PROCEDURE — 3077F SYST BP >= 140 MM HG: CPT | Performed by: NURSE PRACTITIONER

## 2021-02-16 PROCEDURE — 3008F BODY MASS INDEX DOCD: CPT | Performed by: NURSE PRACTITIONER

## 2021-02-16 NOTE — PROGRESS NOTES
Patient presents with:  Vaginal Problem: x 1 month  Lump: on right side    HPI:   Shemar Negron is a 60year old female who presents with c/o vaginal discharge for over a month. She denies any pelvic pain or dyspareunia. Pt complains of itching.  Patient Dyspareunia    • H/O mammogram 4-10-15    Negative   • Osteoarthrosis, unspecified whether generalized or localized, unspecified site    • Osteopenia    • Osteopenia, unspecified location 1/19/2018   • Osteoporosis    • PONV (postoperative nausea and vomit Other         6 siblings   • Heart Disease Maternal Grandmother    • Cancer Neg    • Stroke Neg       Social History:   Social History    Tobacco Use      Smoking status: Former Smoker        Packs/day: 1.00        Years: 32.00        Pack years: 28 underwear after exercise to keep area as dry as possible. Eat yogurt daily to improve balance of natural nick. 3. Palpitations  - TSH W REFLEX TO FREE T4; Future  - CARD MONITOR HOLTER 48 HOUR (CPT=65983);  Future  We discussed may be secondary to anxi

## 2021-02-16 NOTE — PATIENT INSTRUCTIONS
Taking Your Blood Pressure  Blood pressure is the force of blood against the artery wall as it moves from the heart through the blood vessels. You can take your own blood pressure reading using a digital monitor.  Take your readings the same each time, us arm should be at the level of your heart. Wrap the cuff around your upper arm, just above your elbow. It’s best done on bare skin, not over clothing.  Most cuffs will show you where the blood vessel in the middle of the arm at the inner side of the elbow (t reviewed this educational content on 4/1/2020  © 5583-7028 [employer]. All rights reserved. This information is not intended as a substitute for professional medical care. Always follow your healthcare professional's instructions.

## 2021-02-17 RX ORDER — METRONIDAZOLE 7.5 MG/G
1 GEL VAGINAL NIGHTLY
Qty: 70 G | Refills: 0 | Status: SHIPPED | OUTPATIENT
Start: 2021-02-17 | End: 2021-02-22

## 2021-03-17 ENCOUNTER — LAB ENCOUNTER (OUTPATIENT)
Dept: LAB | Age: 66
End: 2021-03-17
Attending: NURSE PRACTITIONER
Payer: MEDICARE

## 2021-03-17 DIAGNOSIS — Z01.89 ENCOUNTER FOR LABORATORY TEST: ICD-10-CM

## 2021-03-17 DIAGNOSIS — R00.2 PALPITATIONS: ICD-10-CM

## 2021-03-17 LAB
ALT SERPL-CCNC: 24 U/L
ANION GAP SERPL CALC-SCNC: 5 MMOL/L (ref 0–18)
AST SERPL-CCNC: 21 U/L (ref 15–37)
BASOPHILS # BLD AUTO: 0.01 X10(3) UL (ref 0–0.2)
BASOPHILS NFR BLD AUTO: 0.3 %
BUN BLD-MCNC: 12 MG/DL (ref 7–18)
BUN/CREAT SERPL: 14.8 (ref 10–20)
CALCIUM BLD-MCNC: 9.4 MG/DL (ref 8.5–10.1)
CHLORIDE SERPL-SCNC: 109 MMOL/L (ref 98–112)
CHOLEST SMN-MCNC: 205 MG/DL (ref ?–200)
CO2 SERPL-SCNC: 29 MMOL/L (ref 21–32)
CREAT BLD-MCNC: 0.81 MG/DL
DEPRECATED RDW RBC AUTO: 44.8 FL (ref 35.1–46.3)
EOSINOPHIL # BLD AUTO: 0.06 X10(3) UL (ref 0–0.7)
EOSINOPHIL NFR BLD AUTO: 1.7 %
ERYTHROCYTE [DISTWIDTH] IN BLOOD BY AUTOMATED COUNT: 13.4 % (ref 11–15)
EST. AVERAGE GLUCOSE BLD GHB EST-MCNC: 120 MG/DL (ref 68–126)
GLUCOSE BLD-MCNC: 88 MG/DL (ref 70–99)
HBA1C MFR BLD HPLC: 5.8 % (ref ?–5.7)
HCT VFR BLD AUTO: 39.5 %
HDLC SERPL-MCNC: 67 MG/DL (ref 40–59)
HGB BLD-MCNC: 12.4 G/DL
IMM GRANULOCYTES # BLD AUTO: 0.01 X10(3) UL (ref 0–1)
IMM GRANULOCYTES NFR BLD: 0.3 %
LDLC SERPL CALC-MCNC: 125 MG/DL (ref ?–100)
LYMPHOCYTES # BLD AUTO: 1.39 X10(3) UL (ref 1–4)
LYMPHOCYTES NFR BLD AUTO: 39.3 %
MCH RBC QN AUTO: 28.8 PG (ref 26–34)
MCHC RBC AUTO-ENTMCNC: 31.4 G/DL (ref 31–37)
MCV RBC AUTO: 91.9 FL
MONOCYTES # BLD AUTO: 0.32 X10(3) UL (ref 0.1–1)
MONOCYTES NFR BLD AUTO: 9 %
NEUTROPHILS # BLD AUTO: 1.75 X10 (3) UL (ref 1.5–7.7)
NEUTROPHILS # BLD AUTO: 1.75 X10(3) UL (ref 1.5–7.7)
NEUTROPHILS NFR BLD AUTO: 49.4 %
NONHDLC SERPL-MCNC: 138 MG/DL (ref ?–130)
OSMOLALITY SERPL CALC.SUM OF ELEC: 295 MOSM/KG (ref 275–295)
PATIENT FASTING Y/N/NP: YES
PATIENT FASTING Y/N/NP: YES
PLATELET # BLD AUTO: 229 10(3)UL (ref 150–450)
POTASSIUM SERPL-SCNC: 4.7 MMOL/L (ref 3.5–5.1)
RBC # BLD AUTO: 4.3 X10(6)UL
SODIUM SERPL-SCNC: 143 MMOL/L (ref 136–145)
TRIGL SERPL-MCNC: 63 MG/DL (ref 30–149)
TSI SER-ACNC: 0.97 MIU/ML (ref 0.36–3.74)
VLDLC SERPL CALC-MCNC: 13 MG/DL (ref 0–30)
WBC # BLD AUTO: 3.5 X10(3) UL (ref 4–11)

## 2021-03-17 PROCEDURE — 80061 LIPID PANEL: CPT

## 2021-03-17 PROCEDURE — 84443 ASSAY THYROID STIM HORMONE: CPT

## 2021-03-17 PROCEDURE — 85025 COMPLETE CBC W/AUTO DIFF WBC: CPT

## 2021-03-17 PROCEDURE — 84450 TRANSFERASE (AST) (SGOT): CPT

## 2021-03-17 PROCEDURE — 84460 ALANINE AMINO (ALT) (SGPT): CPT

## 2021-03-17 PROCEDURE — 36415 COLL VENOUS BLD VENIPUNCTURE: CPT

## 2021-03-17 PROCEDURE — 80048 BASIC METABOLIC PNL TOTAL CA: CPT

## 2021-03-17 PROCEDURE — 83036 HEMOGLOBIN GLYCOSYLATED A1C: CPT

## 2021-03-23 ENCOUNTER — OFFICE VISIT (OUTPATIENT)
Dept: INTERNAL MEDICINE CLINIC | Facility: CLINIC | Age: 66
End: 2021-03-23
Payer: MEDICARE

## 2021-03-23 VITALS
DIASTOLIC BLOOD PRESSURE: 80 MMHG | TEMPERATURE: 98 F | BODY MASS INDEX: 23.34 KG/M2 | OXYGEN SATURATION: 98 % | HEIGHT: 57 IN | HEART RATE: 80 BPM | WEIGHT: 108.19 LBS | RESPIRATION RATE: 16 BRPM | SYSTOLIC BLOOD PRESSURE: 140 MMHG

## 2021-03-23 DIAGNOSIS — M54.9 CHRONIC MIDLINE BACK PAIN, UNSPECIFIED BACK LOCATION: ICD-10-CM

## 2021-03-23 DIAGNOSIS — G89.29 CHRONIC RIGHT SHOULDER PAIN: ICD-10-CM

## 2021-03-23 DIAGNOSIS — E78.2 MIXED HYPERLIPIDEMIA: ICD-10-CM

## 2021-03-23 DIAGNOSIS — N89.8 VAGINAL DISCHARGE: ICD-10-CM

## 2021-03-23 DIAGNOSIS — N76.0 RECURRENT VAGINITIS: ICD-10-CM

## 2021-03-23 DIAGNOSIS — G89.29 CHRONIC MIDLINE BACK PAIN, UNSPECIFIED BACK LOCATION: ICD-10-CM

## 2021-03-23 DIAGNOSIS — Z12.11 SCREEN FOR COLON CANCER: ICD-10-CM

## 2021-03-23 DIAGNOSIS — M85.852 OSTEOPENIA OF LEFT HIP: ICD-10-CM

## 2021-03-23 DIAGNOSIS — M35.9 UNDIFFERENTIATED CONNECTIVE TISSUE DISEASE (HCC): ICD-10-CM

## 2021-03-23 DIAGNOSIS — G47.00 INSOMNIA, UNSPECIFIED TYPE: ICD-10-CM

## 2021-03-23 DIAGNOSIS — Z00.00 ROUTINE GENERAL MEDICAL EXAMINATION AT A HEALTH CARE FACILITY: Primary | ICD-10-CM

## 2021-03-23 DIAGNOSIS — M25.511 CHRONIC RIGHT SHOULDER PAIN: ICD-10-CM

## 2021-03-23 DIAGNOSIS — M48.02 CERVICAL STENOSIS OF SPINE: ICD-10-CM

## 2021-03-23 DIAGNOSIS — M19.012 ARTHRITIS OF LEFT GLENOHUMERAL JOINT: ICD-10-CM

## 2021-03-23 DIAGNOSIS — D70.9 NEUTROPENIA, UNSPECIFIED TYPE (HCC): ICD-10-CM

## 2021-03-23 DIAGNOSIS — M25.512 CHRONIC LEFT SHOULDER PAIN: ICD-10-CM

## 2021-03-23 DIAGNOSIS — G89.29 CHRONIC LEFT SHOULDER PAIN: ICD-10-CM

## 2021-03-23 PROBLEM — M85.859 OSTEOPENIA OF HIP: Status: ACTIVE | Noted: 2018-01-19

## 2021-03-23 PROBLEM — M06.4 POLYARTHRITIS, INFLAMMATORY (HCC): Status: RESOLVED | Noted: 2020-10-23 | Resolved: 2021-03-23

## 2021-03-23 LAB
APPEARANCE: CLEAR
BILIRUBIN: NEGATIVE
GLUCOSE (URINE DIPSTICK): NEGATIVE MG/DL
KETONES (URINE DIPSTICK): NEGATIVE MG/DL
MULTISTIX LOT#: 1044 NUMERIC
NITRITE, URINE: NEGATIVE
OCCULT BLOOD: NEGATIVE
PH, URINE: 6 (ref 4.5–8)
PROTEIN (URINE DIPSTICK): NEGATIVE MG/DL
SPECIFIC GRAVITY: >=1.03 (ref 1–1.03)
URINE-COLOR: YELLOW
UROBILINOGEN,SEMI-QN: 0.2 MG/DL (ref 0–1.9)

## 2021-03-23 PROCEDURE — 87510 GARDNER VAG DNA DIR PROBE: CPT | Performed by: INTERNAL MEDICINE

## 2021-03-23 PROCEDURE — 87077 CULTURE AEROBIC IDENTIFY: CPT | Performed by: INTERNAL MEDICINE

## 2021-03-23 PROCEDURE — 81003 URINALYSIS AUTO W/O SCOPE: CPT | Performed by: INTERNAL MEDICINE

## 2021-03-23 PROCEDURE — 87086 URINE CULTURE/COLONY COUNT: CPT | Performed by: INTERNAL MEDICINE

## 2021-03-23 PROCEDURE — G0439 PPPS, SUBSEQ VISIT: HCPCS | Performed by: INTERNAL MEDICINE

## 2021-03-23 PROCEDURE — 3079F DIAST BP 80-89 MM HG: CPT | Performed by: INTERNAL MEDICINE

## 2021-03-23 PROCEDURE — 87660 TRICHOMONAS VAGIN DIR PROBE: CPT | Performed by: INTERNAL MEDICINE

## 2021-03-23 PROCEDURE — 3008F BODY MASS INDEX DOCD: CPT | Performed by: INTERNAL MEDICINE

## 2021-03-23 PROCEDURE — 3077F SYST BP >= 140 MM HG: CPT | Performed by: INTERNAL MEDICINE

## 2021-03-23 PROCEDURE — 99396 PREV VISIT EST AGE 40-64: CPT | Performed by: INTERNAL MEDICINE

## 2021-03-23 PROCEDURE — 87480 CANDIDA DNA DIR PROBE: CPT | Performed by: INTERNAL MEDICINE

## 2021-03-23 PROCEDURE — 96160 PT-FOCUSED HLTH RISK ASSMT: CPT | Performed by: INTERNAL MEDICINE

## 2021-03-23 RX ORDER — MELOXICAM 15 MG/1
15 TABLET ORAL DAILY PRN
Qty: 30 TABLET | Refills: 0 | Status: SHIPPED | OUTPATIENT
Start: 2021-03-23 | End: 2021-04-13

## 2021-03-23 NOTE — PROGRESS NOTES
Douglas Reddy is a 60year old female. HPI:   Patient presents for a medicare supervisit and additional issues noted below. 1. Dyslipidemia: LDL is slowly improving. 10 year risk is 9%.    2. Bacterial vaginitis: she completed course of metronidazo kg)  01/08/21 : 108 lb (49 kg)  01/06/21 : 108 lb (49 kg)  10/23/20 : 107 lb (48.5 kg)  09/25/20 : 109 lb (49.4 kg)        Grass                   HIVES  Fentanyl                NAUSEA AND VOMITING  Morphine                NAUSEA AND VOMITING    Family His fusion   • REPAIR ROTATOR CUFF,CHRONIC  4572,1104    (bilateral)   • REVISE MEDIAN N/CARPAL TUNNEL SURG  2000   • SHOULDER TOTAL Right 5/30/2019    Performed by Noemi Suarez MD at [facility] OR   • SURGERY - EXTERNAL  September 2007    C-spine surgery for BV and Dyspareunia/vaginal dryness: last BV infection in 2/2021 and metrogel did not help. She is still experiencing vaginal discharge. Swab re-sent today. UA with small leuks. Only tx if UCx is positive as she is not having any urinary sx at this time. and FRAX score is low risk. educated on dietary calcium/vit D3 and weight bearing exericses and fall prevention.    # Vitamin D deficiency: continue daily supplement  # Health Maintenance: medicare supervisit on 3/23/2021  Colon Cancer Screening: 3/18/2016

## 2021-03-23 NOTE — PATIENT INSTRUCTIONS
Please repeat your fasting labs in 3 months and see me 1 week later. I also advise you get the shingrix vaccine once in a lifetime. This is NEW and considered better than the previous shingles vaccine named, zostavax. It can cost up to $200.     I advi

## 2021-04-13 PROBLEM — M32.9 SYSTEMIC LUPUS ERYTHEMATOSUS, UNSPECIFIED SLE TYPE, UNSPECIFIED ORGAN INVOLVEMENT STATUS (HCC): Status: ACTIVE | Noted: 2021-04-13

## 2021-05-06 ENCOUNTER — OFFICE VISIT (OUTPATIENT)
Dept: INTERNAL MEDICINE CLINIC | Facility: CLINIC | Age: 66
End: 2021-05-06
Payer: MEDICARE

## 2021-05-06 VITALS
RESPIRATION RATE: 16 BRPM | OXYGEN SATURATION: 98 % | TEMPERATURE: 98 F | WEIGHT: 108 LBS | HEART RATE: 78 BPM | DIASTOLIC BLOOD PRESSURE: 72 MMHG | BODY MASS INDEX: 23.3 KG/M2 | HEIGHT: 57 IN | SYSTOLIC BLOOD PRESSURE: 130 MMHG

## 2021-05-06 DIAGNOSIS — R30.0 DYSURIA: ICD-10-CM

## 2021-05-06 DIAGNOSIS — N89.8 VAGINAL DISCHARGE: ICD-10-CM

## 2021-05-06 DIAGNOSIS — R35.0 URINARY FREQUENCY: Primary | ICD-10-CM

## 2021-05-06 PROCEDURE — 87088 URINE BACTERIA CULTURE: CPT | Performed by: PHYSICIAN ASSISTANT

## 2021-05-06 PROCEDURE — 87086 URINE CULTURE/COLONY COUNT: CPT | Performed by: PHYSICIAN ASSISTANT

## 2021-05-06 PROCEDURE — 99213 OFFICE O/P EST LOW 20 MIN: CPT | Performed by: PHYSICIAN ASSISTANT

## 2021-05-06 PROCEDURE — 3075F SYST BP GE 130 - 139MM HG: CPT | Performed by: PHYSICIAN ASSISTANT

## 2021-05-06 PROCEDURE — 87186 SC STD MICRODIL/AGAR DIL: CPT | Performed by: PHYSICIAN ASSISTANT

## 2021-05-06 PROCEDURE — 3078F DIAST BP <80 MM HG: CPT | Performed by: PHYSICIAN ASSISTANT

## 2021-05-06 PROCEDURE — 3008F BODY MASS INDEX DOCD: CPT | Performed by: PHYSICIAN ASSISTANT

## 2021-05-06 PROCEDURE — 81003 URINALYSIS AUTO W/O SCOPE: CPT | Performed by: PHYSICIAN ASSISTANT

## 2021-05-06 RX ORDER — NITROFURANTOIN 25; 75 MG/1; MG/1
100 CAPSULE ORAL 2 TIMES DAILY
Qty: 10 CAPSULE | Refills: 0 | Status: SHIPPED | OUTPATIENT
Start: 2021-05-06 | End: 2021-05-11

## 2021-05-06 NOTE — PROGRESS NOTES
HPI:  Lindsey Oh is a 60year old female who presents for urinary symptoms x 2 days. C/o urinary urgency, frequency, dysuria, suprapubic abd pain. Denies fever, chills, sweats, hematuria, nausea, vomiting, flank pain, vaginal itching.   Notes white 18.1      Smokeless tobacco: Never Used    Vaping Use      Vaping Use: Never used    Alcohol use: No      Alcohol/week: 0.0 standard drinks    Drug use: Not Currently      Comment: in her 20s she used many drugs but never IVDU       REVIEW OF SYSTEMS:  10

## 2021-11-16 ENCOUNTER — OFFICE VISIT (OUTPATIENT)
Dept: INTERNAL MEDICINE CLINIC | Facility: CLINIC | Age: 66
End: 2021-11-16
Payer: MEDICARE

## 2021-11-16 VITALS
SYSTOLIC BLOOD PRESSURE: 120 MMHG | DIASTOLIC BLOOD PRESSURE: 62 MMHG | HEART RATE: 75 BPM | HEIGHT: 57 IN | TEMPERATURE: 98 F | BODY MASS INDEX: 22.87 KG/M2 | WEIGHT: 106 LBS | RESPIRATION RATE: 14 BRPM | OXYGEN SATURATION: 99 %

## 2021-11-16 DIAGNOSIS — G47.00 INSOMNIA, UNSPECIFIED TYPE: ICD-10-CM

## 2021-11-16 DIAGNOSIS — N39.0 RECURRENT UTI: Primary | ICD-10-CM

## 2021-11-16 DIAGNOSIS — M32.9 SYSTEMIC LUPUS ERYTHEMATOSUS, UNSPECIFIED SLE TYPE, UNSPECIFIED ORGAN INVOLVEMENT STATUS (HCC): ICD-10-CM

## 2021-11-16 PROCEDURE — 99214 OFFICE O/P EST MOD 30 MIN: CPT | Performed by: INTERNAL MEDICINE

## 2021-11-16 PROCEDURE — 81003 URINALYSIS AUTO W/O SCOPE: CPT | Performed by: INTERNAL MEDICINE

## 2021-11-16 PROCEDURE — 3074F SYST BP LT 130 MM HG: CPT | Performed by: INTERNAL MEDICINE

## 2021-11-16 PROCEDURE — G0008 ADMIN INFLUENZA VIRUS VAC: HCPCS | Performed by: INTERNAL MEDICINE

## 2021-11-16 PROCEDURE — 3008F BODY MASS INDEX DOCD: CPT | Performed by: INTERNAL MEDICINE

## 2021-11-16 PROCEDURE — 90662 IIV NO PRSV INCREASED AG IM: CPT | Performed by: INTERNAL MEDICINE

## 2021-11-16 PROCEDURE — 87086 URINE CULTURE/COLONY COUNT: CPT | Performed by: INTERNAL MEDICINE

## 2021-11-16 PROCEDURE — 3078F DIAST BP <80 MM HG: CPT | Performed by: INTERNAL MEDICINE

## 2021-11-16 RX ORDER — SULFAMETHOXAZOLE AND TRIMETHOPRIM 800; 160 MG/1; MG/1
1 TABLET ORAL 2 TIMES DAILY
Qty: 6 TABLET | Refills: 0 | Status: SHIPPED | OUTPATIENT
Start: 2021-11-16 | End: 2021-11-19

## 2021-11-16 RX ORDER — PAROXETINE HYDROCHLORIDE 25 MG/1
TABLET, FILM COATED, EXTENDED RELEASE ORAL
COMMUNITY
End: 2021-11-16

## 2021-11-16 RX ORDER — DIPHENHYDRAMINE HCL 25 MG
CAPSULE ORAL
COMMUNITY

## 2021-11-16 RX ORDER — NAPROXEN 500 MG/1
TABLET ORAL
COMMUNITY

## 2021-11-16 RX ORDER — MELOXICAM 7.5 MG/1
TABLET ORAL
COMMUNITY
End: 2021-11-16

## 2021-11-16 NOTE — PROGRESS NOTES
Patient Office Visit    ASSESSMENT AND PLAN:   (N39.0) Recurrent UTI  (primary encounter diagnosis)  Plan: URINALYSIS, AUTO, W/O SCOPE,         sulfamethoxazole-trimethoprim -160 MG         Oral Tab per tablet, URINE CULTURE, ROUTINE,         URINE C sulfamethoxazole-trimethoprim -160 MG Oral Tab per tablet 6 tablet 0     Sig: Take 1 tablet by mouth 2 (two) times daily for 3 days.          Tia Chen DO  CC:  Patient presents with:  Urinary Symptoms: Frequent Urination        HPI:   Tonie Saleem cartilage, straighten out 4th toe   • OTHER SURGICAL HISTORY  04/2016    Cervical spine fusion   • REPAIR ROTATOR CUFF,CHRONIC  2023,5373    (bilateral)   • REVISE MEDIAN N/CARPAL TUNNEL SURG  2000   • SURGERY - EXTERNAL  September 2007    C-spine surgery capsule by mouth daily. , Disp: , Rfl:   RESTASIS 0.05 % Ophthalmic Emulsion, Place 1 drop into both eyes every 12 (twelve) hours. , Disp: , Rfl: 6    No current facility-administered medications on file prior to visit.         REVIEW OF SYSTEMS   Constit

## 2021-11-16 NOTE — PATIENT INSTRUCTIONS
You can try melatonin gummies for the sleep  Start the antibiotics and we will let you know if the culture grows a different bacteria.  Continue drinking water

## 2022-03-25 ENCOUNTER — OFFICE VISIT (OUTPATIENT)
Dept: INTERNAL MEDICINE CLINIC | Facility: CLINIC | Age: 67
End: 2022-03-25
Payer: MEDICARE

## 2022-03-25 VITALS
DIASTOLIC BLOOD PRESSURE: 70 MMHG | HEART RATE: 69 BPM | TEMPERATURE: 98 F | BODY MASS INDEX: 23.4 KG/M2 | HEIGHT: 56.5 IN | SYSTOLIC BLOOD PRESSURE: 115 MMHG | RESPIRATION RATE: 16 BRPM | WEIGHT: 107 LBS | OXYGEN SATURATION: 99 %

## 2022-03-25 DIAGNOSIS — R73.01 IMPAIRED FASTING GLUCOSE: ICD-10-CM

## 2022-03-25 DIAGNOSIS — Z12.31 VISIT FOR SCREENING MAMMOGRAM: ICD-10-CM

## 2022-03-25 DIAGNOSIS — M85.852 OSTEOPENIA OF LEFT HIP: ICD-10-CM

## 2022-03-25 DIAGNOSIS — E78.2 MIXED HYPERLIPIDEMIA: ICD-10-CM

## 2022-03-25 DIAGNOSIS — Z12.11 SCREEN FOR COLON CANCER: ICD-10-CM

## 2022-03-25 DIAGNOSIS — Z00.00 ROUTINE GENERAL MEDICAL EXAMINATION AT A HEALTH CARE FACILITY: Primary | ICD-10-CM

## 2022-03-25 DIAGNOSIS — E55.9 VITAMIN D DEFICIENCY: ICD-10-CM

## 2022-03-25 DIAGNOSIS — M32.9 SYSTEMIC LUPUS ERYTHEMATOSUS, UNSPECIFIED SLE TYPE, UNSPECIFIED ORGAN INVOLVEMENT STATUS (HCC): ICD-10-CM

## 2022-03-25 DIAGNOSIS — N89.8 VAGINAL DISCHARGE: ICD-10-CM

## 2022-03-25 DIAGNOSIS — M35.9 UNDIFFERENTIATED CONNECTIVE TISSUE DISEASE (HCC): ICD-10-CM

## 2022-03-25 PROBLEM — H25.10 NUCLEAR SENILE CATARACT: Status: ACTIVE | Noted: 2022-03-25

## 2022-03-25 PROBLEM — M19.011 OSTEOARTHRITIS OF RIGHT SHOULDER, UNSPECIFIED OSTEOARTHRITIS TYPE: Status: RESOLVED | Noted: 2019-05-30 | Resolved: 2022-03-25

## 2022-03-25 PROBLEM — H43.391 OTHER VITREOUS OPACITIES, RIGHT EYE: Status: ACTIVE | Noted: 2022-03-25

## 2022-03-25 PROBLEM — M19.012 ARTHRITIS OF LEFT GLENOHUMERAL JOINT: Status: RESOLVED | Noted: 2020-03-27 | Resolved: 2022-03-25

## 2022-03-25 PROBLEM — H43.391 OTHER VITREOUS OPACITIES, RIGHT EYE: Status: RESOLVED | Noted: 2022-03-25 | Resolved: 2022-03-25

## 2022-03-25 PROBLEM — H16.229 KERATOCONJUNCTIVITIS SICCA, NOT SPECIFIED AS SJOGREN'S: Status: ACTIVE | Noted: 2022-03-25

## 2022-03-25 PROBLEM — H43.392 VITREOUS FLOATERS OF LEFT EYE: Status: ACTIVE | Noted: 2022-03-25

## 2022-03-25 PROBLEM — H43.392 VITREOUS FLOATERS OF LEFT EYE: Status: RESOLVED | Noted: 2022-03-25 | Resolved: 2022-03-25

## 2022-03-25 PROCEDURE — 90670 PCV13 VACCINE IM: CPT | Performed by: INTERNAL MEDICINE

## 2022-03-25 PROCEDURE — 87510 GARDNER VAG DNA DIR PROBE: CPT | Performed by: INTERNAL MEDICINE

## 2022-03-25 PROCEDURE — 87660 TRICHOMONAS VAGIN DIR PROBE: CPT | Performed by: INTERNAL MEDICINE

## 2022-03-25 PROCEDURE — 87480 CANDIDA DNA DIR PROBE: CPT | Performed by: INTERNAL MEDICINE

## 2022-03-25 PROCEDURE — G0439 PPPS, SUBSEQ VISIT: HCPCS | Performed by: INTERNAL MEDICINE

## 2022-03-25 PROCEDURE — G0009 ADMIN PNEUMOCOCCAL VACCINE: HCPCS | Performed by: INTERNAL MEDICINE

## 2022-03-25 RX ORDER — NAPROXEN 500 MG/1
500 TABLET ORAL 2 TIMES DAILY PRN
Qty: 60 TABLET | Refills: 1 | Status: SHIPPED | OUTPATIENT
Start: 2022-03-25

## 2022-03-25 NOTE — PATIENT INSTRUCTIONS
You are due for your mammogram and colonoscopy as soon as possible. Please complete your labs in 1-2 weeks. You are due for your DEXA (bone scan) after October 1, 2022    I also advise you get the shingrix vaccine once in a lifetime. This is NEW and considered better than the previous shingles vaccine named, zostavax. It can cost up to $200. I advise you get the annual flu shot every September, October.

## 2022-04-12 ENCOUNTER — LAB ENCOUNTER (OUTPATIENT)
Dept: LAB | Age: 67
End: 2022-04-12
Attending: INTERNAL MEDICINE
Payer: MEDICARE

## 2022-04-12 DIAGNOSIS — Z00.00 ROUTINE GENERAL MEDICAL EXAMINATION AT A HEALTH CARE FACILITY: ICD-10-CM

## 2022-04-12 DIAGNOSIS — M32.9 SYSTEMIC LUPUS ERYTHEMATOSUS, UNSPECIFIED SLE TYPE, UNSPECIFIED ORGAN INVOLVEMENT STATUS (HCC): ICD-10-CM

## 2022-04-12 DIAGNOSIS — M35.9 UNDIFFERENTIATED CONNECTIVE TISSUE DISEASE (HCC): ICD-10-CM

## 2022-04-12 DIAGNOSIS — R73.01 IMPAIRED FASTING GLUCOSE: ICD-10-CM

## 2022-04-12 DIAGNOSIS — E78.2 MIXED HYPERLIPIDEMIA: ICD-10-CM

## 2022-04-12 DIAGNOSIS — M85.852 OSTEOPENIA OF LEFT HIP: ICD-10-CM

## 2022-04-12 LAB
CHOLEST SERPL-MCNC: 216 MG/DL (ref ?–200)
EST. AVERAGE GLUCOSE BLD GHB EST-MCNC: 117 MG/DL (ref 68–126)
FASTING PATIENT LIPID ANSWER: YES
HBA1C MFR BLD: 5.7 % (ref ?–5.7)
HDLC SERPL-MCNC: 80 MG/DL (ref 40–59)
LDLC SERPL CALC-MCNC: 126 MG/DL (ref ?–100)
NONHDLC SERPL-MCNC: 136 MG/DL (ref ?–130)
TRIGL SERPL-MCNC: 57 MG/DL (ref 30–149)
VIT D+METAB SERPL-MCNC: 29.2 NG/ML (ref 30–100)
VLDLC SERPL CALC-MCNC: 10 MG/DL (ref 0–30)

## 2022-04-12 PROCEDURE — 80061 LIPID PANEL: CPT

## 2022-04-12 PROCEDURE — 83036 HEMOGLOBIN GLYCOSYLATED A1C: CPT

## 2022-04-12 PROCEDURE — 36415 COLL VENOUS BLD VENIPUNCTURE: CPT

## 2022-04-12 PROCEDURE — 82306 VITAMIN D 25 HYDROXY: CPT

## 2022-05-09 ENCOUNTER — LAB ENCOUNTER (OUTPATIENT)
Dept: LAB | Age: 67
End: 2022-05-09
Attending: INTERNAL MEDICINE
Payer: MEDICARE

## 2022-05-09 DIAGNOSIS — Z01.818 PRE-OP TESTING: ICD-10-CM

## 2022-05-09 LAB — SARS-COV-2 RNA RESP QL NAA+PROBE: NOT DETECTED

## 2022-05-12 PROBLEM — Z86.010 PERSONAL HISTORY OF COLONIC POLYPS: Status: ACTIVE | Noted: 2022-05-12

## 2022-05-12 PROBLEM — Z86.0100 PERSONAL HISTORY OF COLONIC POLYPS: Status: ACTIVE | Noted: 2022-05-12

## 2022-05-12 PROBLEM — K64.8 INTERNAL HEMORRHOIDS WITHOUT COMPLICATION: Status: ACTIVE | Noted: 2022-05-12

## 2022-06-23 NOTE — TELEPHONE ENCOUNTER
R/s for today at 1245 per LL chronic, swelling LE b/l on admit  -On home lasix  TTE: EF 35-40%, LV hypokinesis

## 2022-10-28 ENCOUNTER — TELEMEDICINE (OUTPATIENT)
Dept: INTERNAL MEDICINE CLINIC | Facility: CLINIC | Age: 67
End: 2022-10-28
Payer: MEDICARE

## 2022-10-28 DIAGNOSIS — J06.9 ACUTE UPPER RESPIRATORY INFECTION: Primary | ICD-10-CM

## 2022-10-28 PROCEDURE — 99213 OFFICE O/P EST LOW 20 MIN: CPT | Performed by: INTERNAL MEDICINE

## 2022-10-28 RX ORDER — AZITHROMYCIN 250 MG/1
TABLET, FILM COATED ORAL
Qty: 6 TABLET | Refills: 0 | Status: SHIPPED | OUTPATIENT
Start: 2022-10-28 | End: 2022-11-02

## 2022-10-28 RX ORDER — FLUTICASONE PROPIONATE 50 MCG
2 SPRAY, SUSPENSION (ML) NASAL DAILY
Qty: 1 EACH | Refills: 0 | Status: SHIPPED | OUTPATIENT
Start: 2022-10-28

## 2022-10-28 RX ORDER — CODEINE PHOSPHATE AND GUAIFENESIN 10; 100 MG/5ML; MG/5ML
5 SOLUTION ORAL NIGHTLY PRN
Qty: 118 ML | Refills: 0 | Status: SHIPPED | OUTPATIENT
Start: 2022-10-28

## 2022-10-28 NOTE — PROGRESS NOTES
Rhonda Sheppard is a 78year old female here today for a telemedicine/video visit. Patient is in the state of [place] during this visit. Rhonda Sheppard verbally consents to a Video visit service on 10/28/22. Patient understands and accepts financial responsibility for any deductible, co-insurance and/or co-pays associated with this service. Duration of the service: 5 minutes  Start time: 3:05 PM  End time: 3:10 PM    HPI:  Patient presents with complaint of runny nose, sore throat, nasal congestion. Going on for a week and a half now. No sinus pain or pressure. No cough or chest congestion. Occasional chest tightness. Feels a lot of drainage/post-nasal drip especially when laying down at night. Has been taking OTC Sudafed. No fevers/chills/night sweats. Past medical, surgical, family, and social histories were reviewed and are unchanged from previous visit. ROS:  GENERAL: denies fevers/chills/sweats  SKIN: denies any unusual skin lesions  EYES: denies blurred vision or double vision  HEENT: nasal congestion, sore throat  LUNGS: denies shortness of breath with exertion  CARDIOVASCULAR: denies chest pain on exertion  GI: denies abdominal pain, denies heartburn, denies diarrhea  MUSCULOSKELETAL: denies back pain, denies body aches  NEURO: denies headaches    EXAM:  GENERAL: Alert and oriented, well developed, well nourished,in no apparent distress  SKIN: no rashes,no suspicious lesions of face  HEENT: atraumatic, EOMI, normal lid and conjunctiva, normal pharynx on video inspection  NECK: no grossly visible jvd or thyromegaly  LUNGS: speaking in full sentences, no increased work of breathing, no audible wheezing  NEURO: alert and oriented x 3  PSYCH: pleasant, appropriate mood and affect    ASSESSMENT/PLAN:  1. Acute upper respiratory infection  Patient with URI symptoms x 1.5 weeks. Suspect viral URI. Will start on codeine syrup, fluticasone nasal spray.   Advised patient not to drive after taking codeine syrup. Advised to take at night. She has tolerated this before despite documented morphine/fentanyl allergy. Advised her to start azithromycin if symptoms not better by early next week. Advised to call if not better in 1 week. - guaiFENesin-codeine 100-10 MG/5ML Oral Solution; Take 5 mL by mouth nightly as needed for cough or congestion. Dispense: 118 mL; Refill: 0  - fluticasone propionate 50 MCG/ACT Nasal Suspension; 2 sprays by Each Nare route in the morning. Dispense: 1 each; Refill: 0  - azithromycin 250 MG Oral Tab; Take 2 tablets (500 mg total) by mouth daily for 1 day, THEN 1 tablet (250 mg total) daily for 4 days. Dispense: 6 tablet; Refill: 0      Return to clinic in March 2023 with Dr. Christian Mcdaniels for follow up on chronic issues/MA Supervisit, or earlier as needed for acute issues. Please note that the following visit was completed using two-way, real-time interactive audio and video communication. This has been done in good lucas to provide continuity of care in the best interest of the provider-patient relationship, due to the ongoing public health crisis/national emergency and because of restrictions of visitation. There are limitations of this visit as a complete head to toe physical exam could not be performed. Every conscious effort was taken to allow for sufficient and adequate time. This billing was spent on reviewing labs, medications, radiology tests and decision making. Appropriate medical decision-making and tests are ordered as detailed in the plan of care above.

## 2022-11-01 ENCOUNTER — APPOINTMENT (OUTPATIENT)
Dept: GENERAL RADIOLOGY | Age: 67
End: 2022-11-01
Attending: PHYSICIAN ASSISTANT
Payer: MEDICARE

## 2022-11-01 ENCOUNTER — HOSPITAL ENCOUNTER (OUTPATIENT)
Age: 67
Discharge: HOME | End: 2022-11-01
Payer: MEDICARE

## 2022-11-01 VITALS
BODY MASS INDEX: 22.46 KG/M2 | RESPIRATION RATE: 17 BRPM | OXYGEN SATURATION: 97 % | DIASTOLIC BLOOD PRESSURE: 62 MMHG | WEIGHT: 107 LBS | TEMPERATURE: 99 F | HEIGHT: 58 IN | HEART RATE: 118 BPM | SYSTOLIC BLOOD PRESSURE: 138 MMHG

## 2022-11-01 DIAGNOSIS — J22 LOWER RESPIRATORY TRACT INFECTION: Primary | ICD-10-CM

## 2022-11-01 LAB
#MXD IC: 0.2 X10ˆ3/UL (ref 0.1–1)
ATRIAL RATE: 128 BPM
BUN BLD-MCNC: 8 MG/DL (ref 7–18)
CHLORIDE BLD-SCNC: 102 MMOL/L (ref 98–112)
CO2 BLD-SCNC: 25 MMOL/L (ref 21–32)
CREAT BLD-MCNC: 0.7 MG/DL
GFR SERPLBLD BASED ON 1.73 SQ M-ARVRAT: 95 ML/MIN/1.73M2 (ref 60–?)
GLUCOSE BLD-MCNC: 113 MG/DL (ref 70–99)
HCT VFR BLD AUTO: 38.4 %
HCT VFR BLD CALC: 37 %
HGB BLD-MCNC: 12 G/DL
ISTAT IONIZED CALCIUM FOR CHEM 8: 1.23 MMOL/L (ref 1.12–1.32)
LYMPHOCYTES # BLD AUTO: 0.4 X10ˆ3/UL (ref 1–4)
LYMPHOCYTES NFR BLD AUTO: 6.5 %
MCH RBC QN AUTO: 28.1 PG (ref 26–34)
MCHC RBC AUTO-ENTMCNC: 31.3 G/DL (ref 31–37)
MCV RBC AUTO: 89.9 FL (ref 80–100)
MIXED CELL %: 3 %
NEUTROPHILS # BLD AUTO: 5.2 X10ˆ3/UL (ref 1.5–7.7)
NEUTROPHILS NFR BLD AUTO: 90.5 %
P AXIS: 52 DEGREES
P-R INTERVAL: 134 MS
PLATELET # BLD AUTO: 221 X10ˆ3/UL (ref 150–450)
POCT INFLUENZA A: NEGATIVE
POCT INFLUENZA B: NEGATIVE
POTASSIUM BLD-SCNC: 3.3 MMOL/L (ref 3.6–5.1)
Q-T INTERVAL: 312 MS
QRS DURATION: 70 MS
QTC CALCULATION (BEZET): 455 MS
R AXIS: 26 DEGREES
RBC # BLD AUTO: 4.27 X10ˆ6/UL
SARS-COV-2 RNA RESP QL NAA+PROBE: NOT DETECTED
SODIUM BLD-SCNC: 140 MMOL/L (ref 136–145)
T AXIS: 45 DEGREES
TROPONIN I BLD-MCNC: <0.02 NG/ML
VENTRICULAR RATE: 128 BPM
WBC # BLD AUTO: 5.8 X10ˆ3/UL (ref 4–11)

## 2022-11-01 PROCEDURE — 93005 ELECTROCARDIOGRAM TRACING: CPT

## 2022-11-01 PROCEDURE — 96360 HYDRATION IV INFUSION INIT: CPT

## 2022-11-01 PROCEDURE — 93010 ELECTROCARDIOGRAM REPORT: CPT

## 2022-11-01 PROCEDURE — 99214 OFFICE O/P EST MOD 30 MIN: CPT

## 2022-11-01 PROCEDURE — 84484 ASSAY OF TROPONIN QUANT: CPT

## 2022-11-01 PROCEDURE — 87502 INFLUENZA DNA AMP PROBE: CPT | Performed by: PHYSICIAN ASSISTANT

## 2022-11-01 PROCEDURE — 94640 AIRWAY INHALATION TREATMENT: CPT

## 2022-11-01 PROCEDURE — 85025 COMPLETE CBC W/AUTO DIFF WBC: CPT | Performed by: PHYSICIAN ASSISTANT

## 2022-11-01 PROCEDURE — 71046 X-RAY EXAM CHEST 2 VIEWS: CPT | Performed by: PHYSICIAN ASSISTANT

## 2022-11-01 PROCEDURE — 80047 BASIC METABLC PNL IONIZED CA: CPT

## 2022-11-01 PROCEDURE — 99215 OFFICE O/P EST HI 40 MIN: CPT

## 2022-11-01 RX ORDER — IPRATROPIUM BROMIDE AND ALBUTEROL SULFATE 2.5; .5 MG/3ML; MG/3ML
3 SOLUTION RESPIRATORY (INHALATION) ONCE
Status: COMPLETED | OUTPATIENT
Start: 2022-11-01 | End: 2022-11-01

## 2022-11-01 RX ORDER — SODIUM CHLORIDE 9 MG/ML
1000 INJECTION, SOLUTION INTRAVENOUS ONCE
Status: COMPLETED | OUTPATIENT
Start: 2022-11-01 | End: 2022-11-01

## 2022-11-01 RX ORDER — PREDNISONE 20 MG/1
40 TABLET ORAL DAILY
Qty: 8 TABLET | Refills: 0 | Status: SHIPPED | OUTPATIENT
Start: 2022-11-02 | End: 2022-11-06

## 2022-11-01 RX ORDER — DOXYCYCLINE HYCLATE 100 MG/1
100 CAPSULE ORAL 2 TIMES DAILY
Qty: 14 CAPSULE | Refills: 0 | Status: SHIPPED | OUTPATIENT
Start: 2022-11-01 | End: 2022-11-08

## 2022-11-01 RX ORDER — ALBUTEROL SULFATE 2.5 MG/3ML
2.5 SOLUTION RESPIRATORY (INHALATION) EVERY 4 HOURS PRN
Qty: 30 EACH | Refills: 0 | Status: SHIPPED | OUTPATIENT
Start: 2022-11-01 | End: 2022-12-01

## 2022-11-01 RX ORDER — ALBUTEROL SULFATE 2.5 MG/3ML
2.5 SOLUTION RESPIRATORY (INHALATION) ONCE
Status: COMPLETED | OUTPATIENT
Start: 2022-11-01 | End: 2022-11-01

## 2022-11-01 RX ORDER — PREDNISONE 20 MG/1
60 TABLET ORAL ONCE
Status: COMPLETED | OUTPATIENT
Start: 2022-11-01 | End: 2022-11-01

## 2022-11-01 RX ORDER — ALBUTEROL SULFATE 90 UG/1
2 AEROSOL, METERED RESPIRATORY (INHALATION) EVERY 4 HOURS PRN
Qty: 1 EACH | Refills: 0 | Status: SHIPPED | OUTPATIENT
Start: 2022-11-01 | End: 2022-12-01

## 2022-11-01 RX ORDER — ACETAMINOPHEN 325 MG/1
650 TABLET ORAL ONCE
Status: COMPLETED | OUTPATIENT
Start: 2022-11-01 | End: 2022-11-01

## 2022-11-01 NOTE — ED INITIAL ASSESSMENT (HPI)
Patient presents to IC with c/o sinus congestion x 10 days and last night started to cough and sore throat. Fever today. Had video visit on 10/28/22 and prescribed zpak but feels like her symptoms worsened during the night.

## 2022-11-01 NOTE — DISCHARGE INSTRUCTIONS
Take antibiotics as directed, my probiotic instructions    Use albuterol inhaler nebulizer every 4-6 hours    Please follow up with your PCP if no improvement within 5-7 days. Go directly to the ER for any acute worsening of symptoms. If you smoke, stop smoking. Push oral fluids to help loosen up chest mucous and move it out of your body. Use a cold mist humidifier. Get enough rest and sleep. Take Guaifenesin (Robitussin), an expectorant to loosen mucous. To suppress a dry, hacking cough, you may take Dextromethorphan (Robitussin DM)  If a bronchodilating inhaler is prescribed, follow instructions given. If antibiotics is prescribed, be sure to take all of the the medication even if you improve before finishing the medication. Seek immediate medical attention if symptoms worsen, if fever higher than 102, if no improvement in a few days. Symptoms usually last 10 days.

## 2022-11-08 ENCOUNTER — OFFICE VISIT (OUTPATIENT)
Dept: INTERNAL MEDICINE CLINIC | Facility: CLINIC | Age: 67
End: 2022-11-08
Payer: MEDICARE

## 2022-11-08 VITALS
DIASTOLIC BLOOD PRESSURE: 70 MMHG | HEIGHT: 58 IN | WEIGHT: 109.63 LBS | TEMPERATURE: 98 F | SYSTOLIC BLOOD PRESSURE: 150 MMHG | HEART RATE: 75 BPM | OXYGEN SATURATION: 100 % | BODY MASS INDEX: 23.01 KG/M2 | RESPIRATION RATE: 16 BRPM

## 2022-11-08 DIAGNOSIS — J22 ACUTE RESPIRATORY INFECTION: Primary | ICD-10-CM

## 2022-11-08 PROCEDURE — 99213 OFFICE O/P EST LOW 20 MIN: CPT | Performed by: INTERNAL MEDICINE

## 2022-11-08 RX ORDER — PREDNISONE 20 MG/1
40 TABLET ORAL DAILY
Qty: 8 TABLET | Refills: 0 | Status: SHIPPED | OUTPATIENT
Start: 2022-11-08 | End: 2022-11-12

## 2022-11-08 NOTE — PATIENT INSTRUCTIONS
- We will do another round of prednisone  - Start the second/new antibiotic, doxycycline, that the immediate care prescribed  - Stop the codeine cough syrup  - Use the fluticasone/Flonase nasal spray twice daily for the next week  - Get a blood pressure cuff and check at home. Goal is blood pressure <140/90.  - Call us if your symptoms are not better within a week. It was a pleasure seeing you in the clinic today. Thank you for choosing the [facility] office for your healthcare needs. Please call at [phone] with any questions or concerns.     Gabriele Fernandez MD

## 2022-11-12 RX ORDER — FLUTICASONE PROPIONATE 50 MCG
SPRAY, SUSPENSION (ML) NASAL
Qty: 16 G | Refills: 0 | OUTPATIENT
Start: 2022-11-12

## 2023-04-14 ENCOUNTER — LAB ENCOUNTER (OUTPATIENT)
Dept: LAB | Age: 68
End: 2023-04-14
Attending: INTERNAL MEDICINE
Payer: MEDICARE

## 2023-04-14 ENCOUNTER — OFFICE VISIT (OUTPATIENT)
Dept: INTERNAL MEDICINE CLINIC | Facility: CLINIC | Age: 68
End: 2023-04-14
Payer: MEDICARE

## 2023-04-14 VITALS
WEIGHT: 110 LBS | DIASTOLIC BLOOD PRESSURE: 62 MMHG | HEIGHT: 58 IN | RESPIRATION RATE: 16 BRPM | HEART RATE: 85 BPM | SYSTOLIC BLOOD PRESSURE: 122 MMHG | OXYGEN SATURATION: 98 % | BODY MASS INDEX: 23.09 KG/M2 | TEMPERATURE: 98 F

## 2023-04-14 DIAGNOSIS — R73.01 IMPAIRED FASTING GLUCOSE: ICD-10-CM

## 2023-04-14 DIAGNOSIS — J30.9 ALLERGIC RHINITIS, UNSPECIFIED SEASONALITY, UNSPECIFIED TRIGGER: ICD-10-CM

## 2023-04-14 DIAGNOSIS — Z23 NEED FOR PNEUMOCOCCAL VACCINATION: ICD-10-CM

## 2023-04-14 DIAGNOSIS — M85.852 OSTEOPENIA OF LEFT HIP: ICD-10-CM

## 2023-04-14 DIAGNOSIS — E78.2 MIXED HYPERLIPIDEMIA: ICD-10-CM

## 2023-04-14 DIAGNOSIS — Z00.00 ENCOUNTER FOR SUBSEQUENT ANNUAL WELLNESS VISIT (AWV) IN MEDICARE PATIENT: Primary | ICD-10-CM

## 2023-04-14 DIAGNOSIS — M32.9 SYSTEMIC LUPUS ERYTHEMATOSUS, UNSPECIFIED SLE TYPE, UNSPECIFIED ORGAN INVOLVEMENT STATUS (HCC): ICD-10-CM

## 2023-04-14 DIAGNOSIS — Z12.31 ENCOUNTER FOR SCREENING MAMMOGRAM FOR MALIGNANT NEOPLASM OF BREAST: ICD-10-CM

## 2023-04-14 DIAGNOSIS — M35.9 UNDIFFERENTIATED CONNECTIVE TISSUE DISEASE (HCC): ICD-10-CM

## 2023-04-14 DIAGNOSIS — Z00.00 ENCOUNTER FOR SUBSEQUENT ANNUAL WELLNESS VISIT (AWV) IN MEDICARE PATIENT: ICD-10-CM

## 2023-04-14 LAB
ALBUMIN SERPL-MCNC: 3.4 G/DL (ref 3.4–5)
ALBUMIN/GLOB SERPL: 1 {RATIO} (ref 1–2)
ALP LIVER SERPL-CCNC: 70 U/L
ALT SERPL-CCNC: 21 U/L
ANION GAP SERPL CALC-SCNC: 2 MMOL/L (ref 0–18)
AST SERPL-CCNC: 24 U/L (ref 15–37)
BASOPHILS # BLD AUTO: 0.01 X10(3) UL (ref 0–0.2)
BASOPHILS NFR BLD AUTO: 0.3 %
BILIRUB SERPL-MCNC: 0.4 MG/DL (ref 0.1–2)
BUN BLD-MCNC: 10 MG/DL (ref 7–18)
C3 SERPL-MCNC: 120 MG/DL (ref 90–180)
C4 SERPL-MCNC: 31.6 MG/DL (ref 10–40)
CALCIUM BLD-MCNC: 9 MG/DL (ref 8.5–10.1)
CHLORIDE SERPL-SCNC: 107 MMOL/L (ref 98–112)
CHOLEST SERPL-MCNC: 206 MG/DL (ref ?–200)
CO2 SERPL-SCNC: 30 MMOL/L (ref 21–32)
CREAT BLD-MCNC: 0.86 MG/DL
EOSINOPHIL # BLD AUTO: 0.01 X10(3) UL (ref 0–0.7)
EOSINOPHIL NFR BLD AUTO: 0.3 %
ERYTHROCYTE [DISTWIDTH] IN BLOOD BY AUTOMATED COUNT: 13.3 %
EST. AVERAGE GLUCOSE BLD GHB EST-MCNC: 120 MG/DL (ref 68–126)
FASTING PATIENT LIPID ANSWER: YES
FASTING STATUS PATIENT QL REPORTED: YES
GFR SERPLBLD BASED ON 1.73 SQ M-ARVRAT: 74 ML/MIN/1.73M2 (ref 60–?)
GLOBULIN PLAS-MCNC: 3.5 G/DL (ref 2.8–4.4)
GLUCOSE BLD-MCNC: 88 MG/DL (ref 70–99)
HBA1C MFR BLD: 5.8 % (ref ?–5.7)
HCT VFR BLD AUTO: 41.5 %
HDLC SERPL-MCNC: 78 MG/DL (ref 40–59)
HGB BLD-MCNC: 13.1 G/DL
IMM GRANULOCYTES # BLD AUTO: 0.01 X10(3) UL (ref 0–1)
IMM GRANULOCYTES NFR BLD: 0.3 %
LDLC SERPL CALC-MCNC: 118 MG/DL (ref ?–100)
LYMPHOCYTES # BLD AUTO: 0.77 X10(3) UL (ref 1–4)
LYMPHOCYTES NFR BLD AUTO: 25.5 %
MCH RBC QN AUTO: 28.2 PG (ref 26–34)
MCHC RBC AUTO-ENTMCNC: 31.6 G/DL (ref 31–37)
MCV RBC AUTO: 89.2 FL
MONOCYTES # BLD AUTO: 0.4 X10(3) UL (ref 0.1–1)
MONOCYTES NFR BLD AUTO: 13.2 %
NEUTROPHILS # BLD AUTO: 1.82 X10 (3) UL (ref 1.5–7.7)
NEUTROPHILS # BLD AUTO: 1.82 X10(3) UL (ref 1.5–7.7)
NEUTROPHILS NFR BLD AUTO: 60.4 %
NONHDLC SERPL-MCNC: 128 MG/DL (ref ?–130)
OSMOLALITY SERPL CALC.SUM OF ELEC: 286 MOSM/KG (ref 275–295)
PLATELET # BLD AUTO: 189 10(3)UL (ref 150–450)
POTASSIUM SERPL-SCNC: 3.9 MMOL/L (ref 3.5–5.1)
PROT SERPL-MCNC: 6.9 G/DL (ref 6.4–8.2)
RBC # BLD AUTO: 4.65 X10(6)UL
SODIUM SERPL-SCNC: 139 MMOL/L (ref 136–145)
TRIGL SERPL-MCNC: 56 MG/DL (ref 30–149)
VIT D+METAB SERPL-MCNC: 22.4 NG/ML (ref 30–100)
VLDLC SERPL CALC-MCNC: 10 MG/DL (ref 0–30)
WBC # BLD AUTO: 3 X10(3) UL (ref 4–11)

## 2023-04-14 PROCEDURE — 82306 VITAMIN D 25 HYDROXY: CPT

## 2023-04-14 PROCEDURE — 36415 COLL VENOUS BLD VENIPUNCTURE: CPT

## 2023-04-14 PROCEDURE — 83036 HEMOGLOBIN GLYCOSYLATED A1C: CPT

## 2023-04-14 PROCEDURE — 86256 FLUORESCENT ANTIBODY TITER: CPT

## 2023-04-14 PROCEDURE — 86160 COMPLEMENT ANTIGEN: CPT

## 2023-04-14 PROCEDURE — 80053 COMPREHEN METABOLIC PANEL: CPT

## 2023-04-14 PROCEDURE — 80061 LIPID PANEL: CPT

## 2023-04-14 PROCEDURE — 85025 COMPLETE CBC W/AUTO DIFF WBC: CPT

## 2023-04-14 RX ORDER — FLUTICASONE PROPIONATE 50 MCG
2 SPRAY, SUSPENSION (ML) NASAL DAILY
Qty: 1 EACH | Refills: 1 | Status: SHIPPED | OUTPATIENT
Start: 2023-04-14

## 2023-04-14 RX ORDER — ALBUTEROL SULFATE 90 UG/1
2 AEROSOL, METERED RESPIRATORY (INHALATION) EVERY 4 HOURS PRN
Qty: 1 EACH | Refills: 1 | Status: SHIPPED | OUTPATIENT
Start: 2023-04-14 | End: 2023-05-14

## 2023-04-14 NOTE — PATIENT INSTRUCTIONS
- Get blood tests done today  - Schedule mammogram and bone density scan (DEXA scan). You can schedule through [facility] or call central scheduling at [phone].  - Follow up with our [facility] rheumatologists for your lupus:  Dr. Marivel Tavarez and Dr. Edward Banuelos, [place] [address] [facility]  [phone]    [facility] [address] [address]  [place], [address] [place] [address] Rd  [phone]  - For your cough and sinus/chest issues, use prescription fluticasone nasal spray twice daily for next 1-2 weeks. - Also start a nightly over the counter allergy medication, such as cetirizine, loratadine, or fexofenadine. Take this instead of the Sudafed. Call us if you are not feeling better within 1-2 weeks. It was a pleasure seeing you in the clinic today. Thank you for choosing the [facility] office for your healthcare needs. Please call at [phone] with any questions or concerns.     Jonny Hernandez MD

## 2023-04-17 LAB — DSDNA CRITHIDIA LUCILIAE IFA: NEGATIVE

## 2023-06-12 ENCOUNTER — HOSPITAL ENCOUNTER (OUTPATIENT)
Age: 68
Discharge: HOME | End: 2023-06-12
Attending: EMERGENCY MEDICINE
Payer: MEDICARE

## 2023-06-12 VITALS
WEIGHT: 108 LBS | DIASTOLIC BLOOD PRESSURE: 87 MMHG | SYSTOLIC BLOOD PRESSURE: 157 MMHG | OXYGEN SATURATION: 98 % | RESPIRATION RATE: 18 BRPM | TEMPERATURE: 98 F | HEART RATE: 84 BPM | BODY MASS INDEX: 23 KG/M2

## 2023-06-12 DIAGNOSIS — N30.01 ACUTE CYSTITIS WITH HEMATURIA: Primary | ICD-10-CM

## 2023-06-12 LAB
POCT BILIRUBIN URINE: NEGATIVE
POCT GLUCOSE URINE: NEGATIVE MG/DL
POCT KETONE URINE: NEGATIVE MG/DL
POCT NITRITE URINE: NEGATIVE
POCT PH URINE: 5.5 (ref 5–8)
POCT SPECIFIC GRAVITY URINE: 1.02
POCT UROBILINOGEN URINE: 0.2 MG/DL

## 2023-06-12 PROCEDURE — 99214 OFFICE O/P EST MOD 30 MIN: CPT

## 2023-06-12 PROCEDURE — 87086 URINE CULTURE/COLONY COUNT: CPT | Performed by: EMERGENCY MEDICINE

## 2023-06-12 PROCEDURE — 81002 URINALYSIS NONAUTO W/O SCOPE: CPT | Performed by: EMERGENCY MEDICINE

## 2023-06-12 RX ORDER — CEPHALEXIN 500 MG/1
500 CAPSULE ORAL 3 TIMES DAILY
Qty: 21 CAPSULE | Refills: 0 | Status: SHIPPED | OUTPATIENT
Start: 2023-06-12 | End: 2023-06-19

## 2023-06-12 RX ORDER — PHENAZOPYRIDINE HYDROCHLORIDE 200 MG/1
200 TABLET, FILM COATED ORAL 3 TIMES DAILY PRN
Qty: 6 TABLET | Refills: 0 | Status: SHIPPED | OUTPATIENT
Start: 2023-06-12 | End: 2023-06-19

## 2023-06-12 RX ORDER — FLUCONAZOLE 150 MG/1
150 TABLET ORAL ONCE
Qty: 1 TABLET | Refills: 0 | Status: SHIPPED | OUTPATIENT
Start: 2023-06-12 | End: 2023-06-12

## 2023-06-12 NOTE — ED INITIAL ASSESSMENT (HPI)
Pt here c/o urinary frequency, blood in urine, lower abd cramping/and leg cramping since last night. Pain on urination.

## 2023-06-12 NOTE — DISCHARGE INSTRUCTIONS
Follow-up with your doctor over the next week or so drink plenty fluids. Cranberry juice. Take the antibiotics as prescribed. Take the Pyridium as needed for the burning sensation. This will make your urine orange color. Return or go to the ER for worsening symptoms such as high fever chills or vomiting or flank pain.

## 2023-06-16 ENCOUNTER — HOSPITAL ENCOUNTER (OUTPATIENT)
Age: 68
Discharge: HOME | End: 2023-06-16
Payer: MEDICARE

## 2023-06-16 ENCOUNTER — APPOINTMENT (OUTPATIENT)
Dept: CT IMAGING | Age: 68
End: 2023-06-16
Attending: PHYSICIAN ASSISTANT
Payer: MEDICARE

## 2023-06-16 VITALS
OXYGEN SATURATION: 98 % | WEIGHT: 108 LBS | HEART RATE: 67 BPM | BODY MASS INDEX: 23.3 KG/M2 | TEMPERATURE: 98 F | HEIGHT: 57 IN | SYSTOLIC BLOOD PRESSURE: 161 MMHG | DIASTOLIC BLOOD PRESSURE: 99 MMHG | RESPIRATION RATE: 16 BRPM

## 2023-06-16 DIAGNOSIS — N76.0 BACTERIAL VAGINITIS: ICD-10-CM

## 2023-06-16 DIAGNOSIS — R10.2 PELVIC CRAMPING: Primary | ICD-10-CM

## 2023-06-16 DIAGNOSIS — M54.40 BACK PAIN OF LUMBAR REGION WITH SCIATICA: ICD-10-CM

## 2023-06-16 DIAGNOSIS — B96.89 BACTERIAL VAGINITIS: ICD-10-CM

## 2023-06-16 DIAGNOSIS — K59.00 CONSTIPATION, UNSPECIFIED CONSTIPATION TYPE: ICD-10-CM

## 2023-06-16 LAB
#MXD IC: 0.3 X10ˆ3/UL (ref 0.1–1)
BUN BLD-MCNC: 12 MG/DL (ref 7–18)
CHLORIDE BLD-SCNC: 105 MMOL/L (ref 98–112)
CO2 BLD-SCNC: 26 MMOL/L (ref 21–32)
CREAT BLD-MCNC: 0.8 MG/DL
GFR SERPLBLD BASED ON 1.73 SQ M-ARVRAT: 81 ML/MIN/1.73M2 (ref 60–?)
GLUCOSE BLD-MCNC: 96 MG/DL (ref 70–99)
HCT VFR BLD AUTO: 40.9 %
HCT VFR BLD CALC: 41 %
HGB BLD-MCNC: 12.8 G/DL
ISTAT IONIZED CALCIUM FOR CHEM 8: 1.27 MMOL/L (ref 1.12–1.32)
LYMPHOCYTES # BLD AUTO: 1.6 X10ˆ3/UL (ref 1–4)
LYMPHOCYTES NFR BLD AUTO: 41 %
MCH RBC QN AUTO: 28.1 PG (ref 26–34)
MCHC RBC AUTO-ENTMCNC: 31.3 G/DL (ref 31–37)
MCV RBC AUTO: 89.7 FL (ref 80–100)
MIXED CELL %: 8.8 %
NEUTROPHILS # BLD AUTO: 2 X10ˆ3/UL (ref 1.5–7.7)
NEUTROPHILS NFR BLD AUTO: 50.2 %
PLATELET # BLD AUTO: 243 X10ˆ3/UL (ref 150–450)
POCT BILIRUBIN URINE: NEGATIVE
POCT BLOOD URINE: NEGATIVE
POCT GLUCOSE URINE: NEGATIVE MG/DL
POCT KETONE URINE: NEGATIVE MG/DL
POCT NITRITE URINE: NEGATIVE
POCT PH URINE: 6 (ref 5–8)
POCT PROTEIN URINE: NEGATIVE MG/DL
POCT SPECIFIC GRAVITY URINE: 1
POCT URINE CLARITY: CLEAR
POCT UROBILINOGEN URINE: 0.2 MG/DL
POTASSIUM BLD-SCNC: 4.4 MMOL/L (ref 3.6–5.1)
RBC # BLD AUTO: 4.56 X10ˆ6/UL
SODIUM BLD-SCNC: 142 MMOL/L (ref 136–145)
WBC # BLD AUTO: 3.9 X10ˆ3/UL (ref 4–11)

## 2023-06-16 PROCEDURE — 80047 BASIC METABLC PNL IONIZED CA: CPT

## 2023-06-16 PROCEDURE — 74177 CT ABD & PELVIS W/CONTRAST: CPT | Performed by: PHYSICIAN ASSISTANT

## 2023-06-16 PROCEDURE — 87510 GARDNER VAG DNA DIR PROBE: CPT | Performed by: PHYSICIAN ASSISTANT

## 2023-06-16 PROCEDURE — 99215 OFFICE O/P EST HI 40 MIN: CPT

## 2023-06-16 PROCEDURE — 99214 OFFICE O/P EST MOD 30 MIN: CPT

## 2023-06-16 PROCEDURE — 87480 CANDIDA DNA DIR PROBE: CPT | Performed by: PHYSICIAN ASSISTANT

## 2023-06-16 PROCEDURE — 87086 URINE CULTURE/COLONY COUNT: CPT | Performed by: PHYSICIAN ASSISTANT

## 2023-06-16 PROCEDURE — 85025 COMPLETE CBC W/AUTO DIFF WBC: CPT | Performed by: PHYSICIAN ASSISTANT

## 2023-06-16 PROCEDURE — 96374 THER/PROPH/DIAG INJ IV PUSH: CPT

## 2023-06-16 PROCEDURE — 81002 URINALYSIS NONAUTO W/O SCOPE: CPT | Performed by: PHYSICIAN ASSISTANT

## 2023-06-16 PROCEDURE — 87660 TRICHOMONAS VAGIN DIR PROBE: CPT | Performed by: PHYSICIAN ASSISTANT

## 2023-06-16 RX ORDER — METRONIDAZOLE 500 MG/1
500 TABLET ORAL 2 TIMES DAILY
Qty: 14 TABLET | Refills: 0 | Status: SHIPPED | OUTPATIENT
Start: 2023-06-16 | End: 2023-06-23

## 2023-06-16 RX ORDER — KETOROLAC TROMETHAMINE 15 MG/ML
30 INJECTION, SOLUTION INTRAMUSCULAR; INTRAVENOUS ONCE
Status: COMPLETED | OUTPATIENT
Start: 2023-06-16 | End: 2023-06-16

## 2023-06-16 NOTE — DISCHARGE INSTRUCTIONS
Ibuprofen 400mg 3 times a day with food for 3 to 5 days, may alternate with Tylenol 500mg  Light activity, warm cool compresses topically for discomfort  Return if worse    Take 30 mL of milk of magnesia today. This is a laxative. Drink more water. Increase fresh fruits, vegetables and fiber in diet. Use Miralax one scoop in 8 ounces of water or juice once daily until diarrhea occurs then 1-2 times per week to keep stools soft. Go to the emergency room for worsening abdominal pain, persistent vomiting, or blood in the stools. Follow-up with your primary doctor in 3-5 days if not improving.

## 2023-06-16 NOTE — ED INITIAL ASSESSMENT (HPI)
Seen on Monday, dx with UTI. States that was prescribed antibiotics, but states that she's feeling worse. C/o back pain and lower abdominal cramping. Denies fevers.

## 2023-06-19 NOTE — ED NOTES
Patient contacted and states she did see her results of the Bacterial Vaginosis and she picked up the flagyl. She denies any questions/concerns.

## 2023-12-22 ENCOUNTER — OFFICE VISIT (OUTPATIENT)
Dept: INTERNAL MEDICINE CLINIC | Facility: CLINIC | Age: 68
End: 2023-12-22
Payer: MEDICARE

## 2023-12-22 ENCOUNTER — LAB ENCOUNTER (OUTPATIENT)
Dept: LAB | Age: 68
End: 2023-12-22
Attending: INTERNAL MEDICINE
Payer: MEDICARE

## 2023-12-22 VITALS
DIASTOLIC BLOOD PRESSURE: 80 MMHG | HEIGHT: 57 IN | BODY MASS INDEX: 23.69 KG/M2 | HEART RATE: 73 BPM | TEMPERATURE: 98 F | SYSTOLIC BLOOD PRESSURE: 160 MMHG | RESPIRATION RATE: 12 BRPM | WEIGHT: 109.81 LBS | OXYGEN SATURATION: 100 %

## 2023-12-22 DIAGNOSIS — J30.9 ALLERGIC RHINITIS, UNSPECIFIED SEASONALITY, UNSPECIFIED TRIGGER: ICD-10-CM

## 2023-12-22 DIAGNOSIS — E55.9 VITAMIN D DEFICIENCY: ICD-10-CM

## 2023-12-22 DIAGNOSIS — R73.01 IMPAIRED FASTING GLUCOSE: ICD-10-CM

## 2023-12-22 DIAGNOSIS — E78.2 MIXED HYPERLIPIDEMIA: ICD-10-CM

## 2023-12-22 DIAGNOSIS — M35.9 UNDIFFERENTIATED CONNECTIVE TISSUE DISEASE (HCC): ICD-10-CM

## 2023-12-22 DIAGNOSIS — M85.852 OSTEOPENIA OF LEFT HIP: ICD-10-CM

## 2023-12-22 DIAGNOSIS — N95.2 VAGINAL ATROPHY: ICD-10-CM

## 2023-12-22 DIAGNOSIS — R73.01 IMPAIRED FASTING GLUCOSE: Primary | ICD-10-CM

## 2023-12-22 LAB
ALT SERPL-CCNC: 28 U/L
ANION GAP SERPL CALC-SCNC: <0 MMOL/L (ref 0–18)
AST SERPL-CCNC: 19 U/L (ref 15–37)
BUN BLD-MCNC: 15 MG/DL (ref 9–23)
CALCIUM BLD-MCNC: 9.2 MG/DL (ref 8.5–10.1)
CHLORIDE SERPL-SCNC: 110 MMOL/L (ref 98–112)
CO2 SERPL-SCNC: 32 MMOL/L (ref 21–32)
CREAT BLD-MCNC: 0.94 MG/DL
CRP SERPL-MCNC: <0.29 MG/DL (ref ?–0.3)
EGFRCR SERPLBLD CKD-EPI 2021: 67 ML/MIN/1.73M2 (ref 60–?)
ERYTHROCYTE [SEDIMENTATION RATE] IN BLOOD: 23 MM/HR
EST. AVERAGE GLUCOSE BLD GHB EST-MCNC: 114 MG/DL (ref 68–126)
FASTING STATUS PATIENT QL REPORTED: NO
GLUCOSE BLD-MCNC: 93 MG/DL (ref 70–99)
HBA1C MFR BLD: 5.6 % (ref ?–5.7)
OSMOLALITY SERPL CALC.SUM OF ELEC: 293 MOSM/KG (ref 275–295)
POTASSIUM SERPL-SCNC: 4.5 MMOL/L (ref 3.5–5.1)
SODIUM SERPL-SCNC: 141 MMOL/L (ref 136–145)
VIT D+METAB SERPL-MCNC: 19.6 NG/ML (ref 30–100)

## 2023-12-22 PROCEDURE — 84450 TRANSFERASE (AST) (SGOT): CPT

## 2023-12-22 PROCEDURE — 83036 HEMOGLOBIN GLYCOSYLATED A1C: CPT

## 2023-12-22 PROCEDURE — 82306 VITAMIN D 25 HYDROXY: CPT

## 2023-12-22 PROCEDURE — 84460 ALANINE AMINO (ALT) (SGPT): CPT

## 2023-12-22 PROCEDURE — 36415 COLL VENOUS BLD VENIPUNCTURE: CPT

## 2023-12-22 PROCEDURE — 86140 C-REACTIVE PROTEIN: CPT

## 2023-12-22 PROCEDURE — 80048 BASIC METABOLIC PNL TOTAL CA: CPT

## 2023-12-22 PROCEDURE — 85652 RBC SED RATE AUTOMATED: CPT

## 2023-12-22 RX ORDER — ACETAMINOPHEN AND CODEINE PHOSPHATE 300; 30 MG/1; MG/1
1 TABLET ORAL 2 TIMES DAILY PRN
Qty: 60 TABLET | Refills: 0 | Status: SHIPPED | OUTPATIENT
Start: 2023-12-22

## 2023-12-22 RX ORDER — FLUTICASONE PROPIONATE 50 MCG
2 SPRAY, SUSPENSION (ML) NASAL DAILY
Qty: 3 EACH | Refills: 3 | Status: SHIPPED | OUTPATIENT
Start: 2023-12-22

## 2023-12-22 RX ORDER — MELOXICAM 15 MG/1
15 TABLET ORAL DAILY PRN
Qty: 30 TABLET | Refills: 1 | Status: SHIPPED | OUTPATIENT
Start: 2023-12-22

## 2023-12-22 RX ORDER — POLYETHYLENE GLYCOL 3350 17 G/17G
17 POWDER, FOR SOLUTION ORAL DAILY PRN
COMMUNITY

## 2023-12-22 RX ORDER — OMEPRAZOLE 20 MG/1
20 CAPSULE, DELAYED RELEASE ORAL
Qty: 30 CAPSULE | Refills: 1 | Status: SHIPPED | OUTPATIENT
Start: 2023-12-22

## 2023-12-22 NOTE — PATIENT INSTRUCTIONS
You are overdue for your bone density and mammogram.   Please schedule your test by utilizing one of the following options:   Log into your [facility] account to schedule an appointment   Schedule online by accessing [facility]   Call Central Scheduling to schedule an appointment at [phone]    I recommend the following vaccines -  RSV vaccine as soon as possible. Shingrix vaccine once in a lifetime. It is a two step vaccine given 2-6 months apart. Stay up to date with COVID vaccines. Please start meloxicam for inflammation. Use omeprazole 30 minutes prior to breakfast to prevent stomach ulcers from daily meloxicam use. Please bring your machine into your next office visit to ensure it is accurate. I like the [employer] brand. Please keep the receipt. Please measure your blood pressure and pulse daily and record these values. Please measure your blood pressure once daily after you have been resting for at least 5 minutes. Both feet should be flat on the ground and you should be seated with your back supported. Please communicate your blood pressures to me in 14 days.

## 2024-01-04 ENCOUNTER — HOSPITAL ENCOUNTER (OUTPATIENT)
Dept: MAMMOGRAPHY | Age: 69
Discharge: HOME | End: 2024-01-04
Attending: INTERNAL MEDICINE
Payer: MEDICARE

## 2024-01-04 ENCOUNTER — HOSPITAL ENCOUNTER (OUTPATIENT)
Dept: BONE DENSITY | Age: 69
Discharge: HOME | End: 2024-01-04
Attending: INTERNAL MEDICINE
Payer: MEDICARE

## 2024-01-04 DIAGNOSIS — M85.852 OSTEOPENIA OF LEFT HIP: ICD-10-CM

## 2024-01-04 DIAGNOSIS — Z12.31 ENCOUNTER FOR SCREENING MAMMOGRAM FOR MALIGNANT NEOPLASM OF BREAST: ICD-10-CM

## 2024-01-04 PROCEDURE — 77080 DXA BONE DENSITY AXIAL: CPT | Performed by: INTERNAL MEDICINE

## 2024-01-04 PROCEDURE — 77063 BREAST TOMOSYNTHESIS BI: CPT | Performed by: INTERNAL MEDICINE

## 2024-01-04 PROCEDURE — 77067 SCR MAMMO BI INCL CAD: CPT | Performed by: INTERNAL MEDICINE

## 2024-01-22 ENCOUNTER — ORDER TRANSCRIPTION (OUTPATIENT)
Dept: ADMINISTRATIVE | Facility: HOSPITAL | Age: 69
End: 2024-01-22

## 2024-01-22 DIAGNOSIS — Z13.6 SCREENING FOR CARDIOVASCULAR CONDITION: Primary | ICD-10-CM

## 2024-01-25 ENCOUNTER — HOSPITAL ENCOUNTER (OUTPATIENT)
Dept: CT IMAGING | Facility: HOSPITAL | Age: 69
Discharge: HOME | End: 2024-01-25
Attending: INTERNAL MEDICINE

## 2024-01-25 VITALS
SYSTOLIC BLOOD PRESSURE: 162 MMHG | DIASTOLIC BLOOD PRESSURE: 80 MMHG | HEIGHT: 57 IN | BODY MASS INDEX: 23.51 KG/M2 | WEIGHT: 109 LBS

## 2024-01-25 DIAGNOSIS — Z13.6 SCREENING FOR CARDIOVASCULAR CONDITION: ICD-10-CM

## 2024-01-25 LAB
GLUCOSE POC: 74 MG/DL (ref 70–100)
HDL POC: 66 MG/DL (ref 40–60)
LDL POC: 116 MG/DL (ref 0–130)
TC/HDL RATIO: 3 (ref 0–5)
TOTAL CHOLESTEROL POC: 195 MG/DL (ref 0–200)
TRIGLYCERIDES POC: 61 MG/DL (ref 0–200)

## 2024-01-25 NOTE — PROGRESS NOTES
Date of Service 1/25/2024    FCO STOVER  Date of Birth 9/7/1955    Patient Age: 68 year old    PCP: Kendra Perez MD  [address] [address]    Heart Scan Consult  Preliminary Heart Scan Score: 78.7    Previous Screening  Heart Scan Completed Previously: No        Peripheral Vascular Scan Completed Previously: No          Risk Factors  Personal Risk Factors  Non-alterable Risk Factors: Age;Family History  Alterable Risk Factors: High Blood Pressure;Abnormal Cholesterol;Lack of exercise      Body Mass Index  Body mass index is 23.59 kg/m².    Blood Pressure     BP (!) 162/80     (Normal =< 120/80,  Elevated = 120-129/ >80,  High Stage1 130-139/80-89 , Stage2 >140/>90)    Lipid Profile  Patient was in fasting state: Yes    Cholesterol: 195, done on 1/25/2024.  HDL Cholesterol: 66, done on 1/25/2024.  LDL Cholesterol: 116, done on 1/25/2024.  TriGlycerides 61, done on 1/25/2024.    Cholesterol Goals  Value   Total  =< 200   HDL  = > 45 Men = > 55 Women   LDL   =< 100   Triglycerides  =< 150       Glucose and Hemoglobin A1C  Lab Results   Component Value Date    GLU 74 01/25/2024    A1C 5.6 12/22/2023     (Normal Fasting Glucose < 100mg/dl )    Nurse Review  Risk factor information and results reviewed with Nurse: Yes    Recommended Follow Up:  Consult your physician regarding:: Final Heart Scan Report;Discuss potential for Incidental Finding  Blood pressure. Has been monitoring her blood pressure and will report to MD       Recommendations for Change:  Nutrition Changes: Low Saturated Fat;Low Fat Dairy;Low Salt Eating;Increase Fiber    Cholesterol Modification (goal of therapy depends upon your risk): Decrease LDL (Lousy/Bad) Ideal <100    Exercise: Initiate Program has had some recent orthopedic issues limiting ability to exercise          Weight Management: Maintain Current Weight    Stress Management: Adopt Stress Management Techniques    Repeat Heart Scan: 3 Years if Calcium  Score is > 0.0;Discuss with your Physician              [facility] Recommended Resources:  Recommended Resources: PV Screening;Upcoming Classes, Medical Services and Health Library [email or web address]  Recommended PV Screening: Abdomen;Carotids         Lia CHACON RN        Please Contact the Nurse Heart Line with any Questions or Concerns [phone].

## 2024-01-30 DIAGNOSIS — E78.2 MIXED HYPERLIPIDEMIA: Primary | ICD-10-CM

## 2024-01-30 RX ORDER — ATORVASTATIN CALCIUM 10 MG/1
10 TABLET, FILM COATED ORAL DAILY
Qty: 90 TABLET | Refills: 1 | Status: SHIPPED | OUTPATIENT
Start: 2024-01-30

## 2024-02-11 NOTE — PROGRESS NOTES
Signed narcotic contract with patient, verbalized understanding, copy of contract provided to patient. Spoke with pharmacist Sravani Mayorga from [address] [place] [address], Tramadol 50 mg rx called into [address] [place] [address]. No

## 2024-02-14 RX ORDER — OMEPRAZOLE 20 MG/1
20 CAPSULE, DELAYED RELEASE ORAL
Qty: 90 CAPSULE | Refills: 0 | Status: SHIPPED | OUTPATIENT
Start: 2024-02-14

## 2024-03-22 ENCOUNTER — OFFICE VISIT (OUTPATIENT)
Dept: INTERNAL MEDICINE CLINIC | Facility: CLINIC | Age: 69
End: 2024-03-22
Payer: MEDICARE

## 2024-03-22 ENCOUNTER — LAB ENCOUNTER (OUTPATIENT)
Dept: LAB | Age: 69
End: 2024-03-22
Attending: INTERNAL MEDICINE
Payer: MEDICARE

## 2024-03-22 VITALS
DIASTOLIC BLOOD PRESSURE: 70 MMHG | HEART RATE: 68 BPM | TEMPERATURE: 97 F | OXYGEN SATURATION: 98 % | BODY MASS INDEX: 22.65 KG/M2 | RESPIRATION RATE: 14 BRPM | WEIGHT: 105 LBS | HEIGHT: 57 IN | SYSTOLIC BLOOD PRESSURE: 150 MMHG

## 2024-03-22 DIAGNOSIS — R00.2 HEART PALPITATIONS: ICD-10-CM

## 2024-03-22 DIAGNOSIS — M32.9 SYSTEMIC LUPUS ERYTHEMATOSUS, UNSPECIFIED SLE TYPE, UNSPECIFIED ORGAN INVOLVEMENT STATUS (HCC): ICD-10-CM

## 2024-03-22 DIAGNOSIS — I10 PRIMARY HYPERTENSION: ICD-10-CM

## 2024-03-22 DIAGNOSIS — M47.812 CERVICAL SPONDYLOSIS WITHOUT MYELOPATHY: ICD-10-CM

## 2024-03-22 DIAGNOSIS — E55.9 VITAMIN D DEFICIENCY: ICD-10-CM

## 2024-03-22 DIAGNOSIS — Z00.00 ROUTINE GENERAL MEDICAL EXAMINATION AT A HEALTH CARE FACILITY: Primary | ICD-10-CM

## 2024-03-22 DIAGNOSIS — E78.2 MIXED HYPERLIPIDEMIA: ICD-10-CM

## 2024-03-22 DIAGNOSIS — M35.9 UNDIFFERENTIATED CONNECTIVE TISSUE DISEASE (HCC): ICD-10-CM

## 2024-03-22 DIAGNOSIS — Z00.00 ROUTINE GENERAL MEDICAL EXAMINATION AT A HEALTH CARE FACILITY: ICD-10-CM

## 2024-03-22 DIAGNOSIS — M81.0 AGE-RELATED OSTEOPOROSIS WITHOUT CURRENT PATHOLOGICAL FRACTURE: ICD-10-CM

## 2024-03-22 PROBLEM — M85.859 OSTEOPENIA OF HIP: Status: RESOLVED | Noted: 2018-01-19 | Resolved: 2024-03-22

## 2024-03-22 PROBLEM — K64.8 INTERNAL HEMORRHOIDS WITHOUT COMPLICATION: Status: RESOLVED | Noted: 2022-05-12 | Resolved: 2024-03-22

## 2024-03-22 LAB
ALT SERPL-CCNC: 20 U/L
ANION GAP SERPL CALC-SCNC: 3 MMOL/L (ref 0–18)
AST SERPL-CCNC: 27 U/L (ref ?–34)
BUN BLD-MCNC: 17 MG/DL (ref 9–23)
BUN/CREAT SERPL: 19.5 (ref 10–20)
CALCIUM BLD-MCNC: 10 MG/DL (ref 8.7–10.4)
CHLORIDE SERPL-SCNC: 110 MMOL/L (ref 98–112)
CHOLEST SERPL-MCNC: 194 MG/DL (ref ?–200)
CO2 SERPL-SCNC: 31 MMOL/L (ref 21–32)
CREAT BLD-MCNC: 0.87 MG/DL
EGFRCR SERPLBLD CKD-EPI 2021: 73 ML/MIN/1.73M2 (ref 60–?)
FASTING PATIENT LIPID ANSWER: YES
FASTING STATUS PATIENT QL REPORTED: YES
GLUCOSE BLD-MCNC: 90 MG/DL (ref 70–99)
HDLC SERPL-MCNC: 76 MG/DL (ref 40–59)
LDLC SERPL CALC-MCNC: 105 MG/DL (ref ?–100)
NONHDLC SERPL-MCNC: 118 MG/DL (ref ?–130)
OSMOLALITY SERPL CALC.SUM OF ELEC: 299 MOSM/KG (ref 275–295)
POTASSIUM SERPL-SCNC: 4.6 MMOL/L (ref 3.5–5.1)
SODIUM SERPL-SCNC: 144 MMOL/L (ref 136–145)
TRIGL SERPL-MCNC: 69 MG/DL (ref 30–149)
VIT D+METAB SERPL-MCNC: 56.2 NG/ML (ref 30–100)
VLDLC SERPL CALC-MCNC: 12 MG/DL (ref 0–30)

## 2024-03-22 PROCEDURE — 99214 OFFICE O/P EST MOD 30 MIN: CPT | Performed by: INTERNAL MEDICINE

## 2024-03-22 PROCEDURE — 80048 BASIC METABOLIC PNL TOTAL CA: CPT

## 2024-03-22 PROCEDURE — 84450 TRANSFERASE (AST) (SGOT): CPT

## 2024-03-22 PROCEDURE — 84460 ALANINE AMINO (ALT) (SGPT): CPT

## 2024-03-22 PROCEDURE — 80061 LIPID PANEL: CPT

## 2024-03-22 PROCEDURE — 96160 PT-FOCUSED HLTH RISK ASSMT: CPT | Performed by: INTERNAL MEDICINE

## 2024-03-22 PROCEDURE — 82306 VITAMIN D 25 HYDROXY: CPT

## 2024-03-22 PROCEDURE — 36415 COLL VENOUS BLD VENIPUNCTURE: CPT

## 2024-03-22 RX ORDER — DULOXETIN HYDROCHLORIDE 20 MG/1
20 CAPSULE, DELAYED RELEASE ORAL NIGHTLY
Qty: 30 CAPSULE | Refills: 1 | Status: SHIPPED | OUTPATIENT
Start: 2024-03-22

## 2024-03-22 RX ORDER — IBUPROFEN 800 MG/1
800 TABLET ORAL 3 TIMES DAILY
Qty: 90 TABLET | Refills: 3 | Status: SHIPPED | OUTPATIENT
Start: 2024-03-22 | End: 2025-03-17

## 2024-03-22 RX ORDER — AMLODIPINE BESYLATE 2.5 MG/1
2.5 TABLET ORAL EVERY EVENING
Qty: 90 TABLET | Refills: 3 | Status: SHIPPED | OUTPATIENT
Start: 2024-03-22 | End: 2025-03-17

## 2024-03-22 NOTE — PATIENT INSTRUCTIONS
For your high blood pressure please start amlodipine (norvasc) 2.5 mg every EVENING and send me your blood pressures in 14 days to see if the medication is helping.     If you do not hear from the [facility] in one week regarding your injection for your bones (reclast/zolendronic acid) please call them PH: [phone]    Please start over the counter calcium 600 mg twice daily     Please stop the meloxicam and you can try ibuprofen 800 mg. If you use the ibuprofen more than 3 days per week then you need to continue taking omeprazole to protect your stomach.    For your chronic pain and nerve pain please try duloxetine 20 mg nightly. If this is helping please update me in 2 weeks and we can increase the dose if you would like. Please do not start the duloxetine until you are tolerating your blood pressure medication well.     I recommend the following vaccines -  RSV vaccine     TDAP as soon as possible.     Shingrix vaccine once in a lifetime. It is a two step vaccine given 2-6 months apart.     Annual flu shot every September, October.    Stay up to date with COVID vaccines.

## 2024-03-22 NOTE — PROGRESS NOTES
Shannon Huntley is a 68 year old female.     HPI:   Patient presents for the folowing issues and MAS. It does not appear she has read her MCM regarding her CT calcium score.   10 year ASCVD score 14% and CT calcium score of 80. She did start atorvastatin 2 months ago. She does not think her aches are worsening but her hands are always painful.   Osteoporosis - discuss reclast and she is open  Elevated BP - brought in home machine and home pressurs are usually 140/60s  Vitamin D Deficiency - has been tolerating replacement  Vaccines - due for tDAP and shingrix.   Chronic pain - she used meloxicam daily for 30 days but it did not help. She has used tylenol #3 once weekly and thinks it helps a little. Helps her fall alseep but not stay asleep because the pain wakes her up.       REVIEW OF SYSTEMS:   GENERAL HEALTH: feels well otherwise. No f/c  NEURO: denies any headaches, LH, dizzyness, LOC, falls  VISION: denies any blurred or double vision  RESPIRATORY: denies shortness of breath, cough, or congestion  CARDIOVASCULAR: denies chest pain, pressure. Experiencing palpitations mainly at night while sleeping and wakes her up. Lasts a few minutes. Cold compress helps. She does not feel anxious. No associated chest pain or shortness of breath. It is less severe. Occurs a few times/week.   GI: denies abdominal pain,  diarrhea, n/v, BRBPR, melena. Chronic constipation is well controlled with miralax  : no dysuria or hematuria or vaginal bleeding  SKIN: denies any unusual skin lesions or rashes  PSYCH: mood is - down because of her chronic pain. Denies SI/HI  EXT: denies edema       Wt Readings from Last 6 Encounters:   01/25/24 109 lb (49.4 kg)   12/22/23 109 lb 12.8 oz (49.8 kg)   06/16/23 108 lb (49 kg)   06/12/23 108 lb (49 kg)   04/14/23 110 lb (49.9 kg)   11/08/22 109 lb 9.6 oz (49.7 kg)       Allergies   Allergen Reactions    Grass HIVES    Fentanyl NAUSEA AND VOMITING    Morphine NAUSEA AND VOMITING       Family  History   Problem Relation Age of Onset    Other (Unknown) Father     Hypertension Mother     Heart Disease Mother     Other (atherosclerosis, PTCA, pacemaker) Mother     Other (lupus) Mother     Heart Disease Maternal Grandmother     Ovarian Cancer Sister 60    Other (Anemia, MI, alcoholic cirrhosis) Other         6 siblings    Cancer Neg     Stroke Neg       Past Medical History:   Diagnosis Date    Abdominal pain, right upper quadrant 1/25/2012    Anxiety state, unspecified     Arthritis     Back pain     Biceps muscle tear     Carpal tunnel syndrome     Depression     Dyspareunia     H/O mammogram 4-10-15    Negative    Osteoarthrosis, unspecified whether generalized or localized, unspecified site     Osteopenia     Osteopenia, unspecified location 1/19/2018    Osteoporosis     Pain in joints     PONV (postoperative nausea and vomiting)     Rotator cuff syndrome     Spinal cord tumor     c2 intraspinal tumor with cord compression, neurilemmoma     Undifferentiated connective tissue disease (HCC)     Visual impairment     glasses and contacts    Wears glasses       Past Surgical History:   Procedure Laterality Date    ANESTH,SHOULDER REPLACEMENT Right 05/30/2019    Right reverse total shoulder arthroplasty- Dr. Vaca    BACK SURGERY  2007     cer tumors removed    COLONOSCOPY  2010    COLONOSCOPY      HYSTERECTOMY  1985    RUKHSANA and BSO, fibroids, 1987    HYSTERECTOMY      OTHER SURGICAL HISTORY  2006    OTHER SURGICAL HISTORY  1/2008    R toe artificial cartilage, straighten out 4th toe    OTHER SURGICAL HISTORY  04/2016    Cervical spine fusion    REPAIR ROTATOR CUFF,CHRONIC  2001,2003    (bilateral)    REVISE MEDIAN N/CARPAL TUNNEL SURG  2000    ROTATOR CUFF REPAIR      SURGERY - EXTERNAL  September 2007    C-spine surgery for Intraspinal tumor      Social History:    Social History     Socioeconomic History    Marital status:     Number of children: 1   Occupational History    Occupation: Homemaker      Comment: Off work since  due to disability   Tobacco Use    Smoking status: Former     Packs/day: 1.00     Years: 32.00     Additional pack years: 0.00     Total pack years: 32.00     Types: Cigarettes     Quit date: 3/20/2003     Years since quittin.0    Smokeless tobacco: Never   Vaping Use    Vaping Use: Never used   Substance and Sexual Activity    Alcohol use: No     Alcohol/week: 0.0 standard drinks of alcohol    Drug use: Not Currently     Comment: in her 20s she used many drugs but never IVDU    Sexual activity: Yes     Partners: Male     Birth control/protection: Hysterectomy   Other Topics Concern    Caffeine Concern Yes     Comment: 1 cup of coffee daily    Stress Concern No    Weight Concern No    Special Diet No    Exercise No    Seat Belt Yes           EXAM:   /70 (BP Location: Left arm)   Pulse 68   Temp 97 °F (36.1 °C) (Temporal)   Resp 14   Ht 4' 9\" (1.448 m)   Wt 105 lb (47.6 kg)   SpO2 98%   BMI 22.72 kg/m²   GENERAL: A&O well developed, well nourished,in no apparent distress  SKIN: no rashes,no suspicious lesions  HEENT: atraumatic, MMM, throat is clear  NECK: supple, no jvd, no thyromegaly  LUNGS: clear to auscultation bilateraly, no c/w/r  CARDIO: RRR without g/m/r  GI: soft non tender nondistended no hsm bs throughout  NEURO: CN 2-12 grossly intact  PSYCH: pleasant  EXTREMITIES: no cyanosis, clubbing or edema    ASSESSMENT AND PLAN:   # Palpitations - for months. Check zio patch  # Chronic Pain - involves multiple joints but hands are worst. Multiple agents have not helped. Her inflammatory markers were normal so I suspect this is more OA related pain. Trial of duloxetine and cont tylenol #3 for breakthrough pain. She feels ibuprofen 800 mg is more helpful than meloxicam. Discussed GI ppx  # Arthritis of left glenohumeral joint: chronic. Following with ortho and getting cortisone injections.   # Insomnia 2/2 Pain: worsening. sleep hygiene, ambien, and trazodone, have not  helped. Treating the underlying issue (pain) is the ultimate solution.    # Recurrent BV and Dyspareunia/vaginal dryness: last BV infection in 6/2023. Cont vaginal estrogen.   # HLP and high CT calcium score - we started atorvastatin 8 weeks ago. Check lipids now.   # Primary HTN - NEW, start norvasc.   # Cervical stenosis, disc herniation with radiculopathy and history of cervical spine tumor, cervical kyphosis: chronic, see pain management above.   - s/p L C3-6 ACDF by Dr. Domingo Gallardo in 4/2016 to stabilize spinal cord.    - underwent b/l facet joint injections, tylenol #4 by Dr. Will but this was not helpful.   - gabapentin, tylenol, meloxicam, voltaren,  lidoerm patches, PT, HEP have not helped  #  Diffuse OA, UCTD and SLE -wants to establish with new rheumatolgoy. On plaquenil. Does not tolerate prednisone (palpitations).   - Could not tolerate gabapentin, lyrica, lexapro, or cymbalta b/c of side effects.  # Osteoporosis: per DEXA in 1/2024. We will start reclast. Paperwork completed today.   - alendronate (worsened her GERD).  educated on dietary calcium/vit D3 and weight bearing exericses and fall prevention. overdue for DEXA.   # Vitamin D deficiency: continue daily supplement  # Health Maintenance: medicare supervisit in 3/2024   Colon Cancer Screening: colonoscopy in 5/2022 by Dawson Majano was normal. Repeat in 5 years (2027) for personal history of colon polyps.   Breast Cancer Screening: cont yearly mammogram   Bone Health: see above.   Vaccines:  up to date with pneumonia series. Cont flu, covid, RSV, shingrix and due for TDAP  Healthcare Living Will, Medical POA:  daughter, Salima Ferris  Hep C Screen: neg in 2017     Care Team:  Podiatry- Dr. Lynch  Ortho spine - Devang Pena ortho - Carlos Eduardo  Ophtho - Clifton Schneider        The patient indicates understanding of these issues and agrees to the plan.  The patient is asked to return in 3-4 months for HTN and chronic pain.   Kendra Perez MD

## 2024-03-27 ENCOUNTER — TELEPHONE (OUTPATIENT)
Dept: INTERNAL MEDICINE CLINIC | Facility: CLINIC | Age: 69
End: 2024-03-27

## 2024-03-27 DIAGNOSIS — M81.0 AGE-RELATED OSTEOPOROSIS WITHOUT CURRENT PATHOLOGICAL FRACTURE: Primary | ICD-10-CM

## 2024-03-29 RX ORDER — ZOLEDRONIC ACID 5 MG/100ML
5 INJECTION, SOLUTION INTRAVENOUS ONCE
OUTPATIENT
Start: 2024-03-29

## 2024-04-24 ENCOUNTER — LAB ENCOUNTER (OUTPATIENT)
Dept: LAB | Age: 69
End: 2024-04-24
Attending: INTERNAL MEDICINE
Payer: MEDICARE

## 2024-04-24 ENCOUNTER — OFFICE VISIT (OUTPATIENT)
Dept: RHEUMATOLOGY | Facility: CLINIC | Age: 69
End: 2024-04-24
Payer: MEDICARE

## 2024-04-24 VITALS
HEIGHT: 57 IN | BODY MASS INDEX: 22.65 KG/M2 | HEART RATE: 68 BPM | SYSTOLIC BLOOD PRESSURE: 148 MMHG | DIASTOLIC BLOOD PRESSURE: 70 MMHG | OXYGEN SATURATION: 98 % | RESPIRATION RATE: 16 BRPM | WEIGHT: 105 LBS

## 2024-04-24 DIAGNOSIS — E78.2 MIXED HYPERLIPIDEMIA: ICD-10-CM

## 2024-04-24 DIAGNOSIS — M47.814 OSTEOARTHRITIS OF THORACIC SPINE, UNSPECIFIED SPINAL OSTEOARTHRITIS COMPLICATION STATUS: ICD-10-CM

## 2024-04-24 DIAGNOSIS — R79.89 LOW VITAMIN D LEVEL: ICD-10-CM

## 2024-04-24 DIAGNOSIS — M79.641 BILATERAL HAND PAIN: ICD-10-CM

## 2024-04-24 DIAGNOSIS — M19.011 BILATERAL SHOULDER REGION ARTHRITIS: ICD-10-CM

## 2024-04-24 DIAGNOSIS — M35.9 UNDIFFERENTIATED CONNECTIVE TISSUE DISEASE (HCC): Primary | ICD-10-CM

## 2024-04-24 DIAGNOSIS — L65.9 ALOPECIA: ICD-10-CM

## 2024-04-24 DIAGNOSIS — M79.642 BILATERAL HAND PAIN: ICD-10-CM

## 2024-04-24 DIAGNOSIS — M47.812 OSTEOARTHRITIS OF CERVICAL SPINE, UNSPECIFIED SPINAL OSTEOARTHRITIS COMPLICATION STATUS: ICD-10-CM

## 2024-04-24 DIAGNOSIS — M19.012 BILATERAL SHOULDER REGION ARTHRITIS: ICD-10-CM

## 2024-04-24 DIAGNOSIS — M06.4 INFLAMMATORY POLYARTHRITIS (HCC): ICD-10-CM

## 2024-04-24 DIAGNOSIS — M47.816 OSTEOARTHRITIS OF LUMBAR SPINE, UNSPECIFIED SPINAL OSTEOARTHRITIS COMPLICATION STATUS: ICD-10-CM

## 2024-04-24 DIAGNOSIS — Z51.81 THERAPEUTIC DRUG MONITORING: ICD-10-CM

## 2024-04-24 DIAGNOSIS — M32.9 SYSTEMIC LUPUS ERYTHEMATOSUS, UNSPECIFIED SLE TYPE, UNSPECIFIED ORGAN INVOLVEMENT STATUS (HCC): ICD-10-CM

## 2024-04-24 DIAGNOSIS — G89.4 CHRONIC PAIN SYNDROME: ICD-10-CM

## 2024-04-24 LAB
ALT SERPL-CCNC: 22 U/L
AST SERPL-CCNC: 26 U/L (ref ?–34)
BASOPHILS # BLD AUTO: 0.02 X10(3) UL (ref 0–0.2)
BASOPHILS NFR BLD AUTO: 0.5 %
BILIRUB UR QL: NEGATIVE
C3 SERPL-MCNC: 102 MG/DL (ref 90–170)
C4 SERPL-MCNC: 24.8 MG/DL (ref 12–36)
CHOLEST SERPL-MCNC: 186 MG/DL (ref ?–200)
CLARITY UR: CLEAR
COLOR UR: COLORLESS
CREAT UR-SCNC: 27.2 MG/DL
CRP SERPL-MCNC: <0.4 MG/DL (ref ?–1)
DEPRECATED HBV CORE AB SER IA-ACNC: 38.3 NG/ML
DEPRECATED RDW RBC AUTO: 42.7 FL (ref 35.1–46.3)
EOSINOPHIL # BLD AUTO: 0.03 X10(3) UL (ref 0–0.7)
EOSINOPHIL NFR BLD AUTO: 0.7 %
ERYTHROCYTE [DISTWIDTH] IN BLOOD BY AUTOMATED COUNT: 12.9 % (ref 11–15)
ERYTHROCYTE [SEDIMENTATION RATE] IN BLOOD: 33 MM/HR
FASTING PATIENT LIPID ANSWER: YES
FOLATE SERPL-MCNC: >24 NG/ML (ref 5.4–?)
GLUCOSE UR-MCNC: NORMAL MG/DL
HCT VFR BLD AUTO: 38.2 %
HDLC SERPL-MCNC: 72 MG/DL (ref 40–59)
HGB BLD-MCNC: 12.8 G/DL
HGB UR QL STRIP.AUTO: NEGATIVE
IMM GRANULOCYTES # BLD AUTO: 0 X10(3) UL (ref 0–1)
IMM GRANULOCYTES NFR BLD: 0 %
IRON SATN MFR SERPL: 25 %
IRON SERPL-MCNC: 78 UG/DL
KETONES UR-MCNC: NEGATIVE MG/DL
LDLC SERPL CALC-MCNC: 104 MG/DL (ref ?–100)
LEUKOCYTE ESTERASE UR QL STRIP.AUTO: NEGATIVE
LYMPHOCYTES # BLD AUTO: 1.61 X10(3) UL (ref 1–4)
LYMPHOCYTES NFR BLD AUTO: 38 %
MCH RBC QN AUTO: 30.1 PG (ref 26–34)
MCHC RBC AUTO-ENTMCNC: 33.5 G/DL (ref 31–37)
MCV RBC AUTO: 89.9 FL
MONOCYTES # BLD AUTO: 0.27 X10(3) UL (ref 0.1–1)
MONOCYTES NFR BLD AUTO: 6.4 %
NEUTROPHILS # BLD AUTO: 2.31 X10 (3) UL (ref 1.5–7.7)
NEUTROPHILS # BLD AUTO: 2.31 X10(3) UL (ref 1.5–7.7)
NEUTROPHILS NFR BLD AUTO: 54.4 %
NITRITE UR QL STRIP.AUTO: NEGATIVE
NONHDLC SERPL-MCNC: 114 MG/DL (ref ?–130)
PH UR: 5 [PH] (ref 5–8)
PLATELET # BLD AUTO: 246 10(3)UL (ref 150–450)
PROT UR-MCNC: <6 MG/DL (ref ?–14)
PROT UR-MCNC: NEGATIVE MG/DL
RBC # BLD AUTO: 4.25 X10(6)UL
SP GR UR STRIP: 1.01 (ref 1–1.03)
TIBC SERPL-MCNC: 311 UG/DL (ref 250–425)
TRANSFERRIN SERPL-MCNC: 209 MG/DL (ref 250–380)
TRIGL SERPL-MCNC: 51 MG/DL (ref 30–149)
TSI SER-ACNC: 0.72 MIU/ML (ref 0.55–4.78)
UROBILINOGEN UR STRIP-ACNC: NORMAL
VIT B12 SERPL-MCNC: 518 PG/ML (ref 211–911)
VLDLC SERPL CALC-MCNC: 9 MG/DL (ref 0–30)
WBC # BLD AUTO: 4.2 X10(3) UL (ref 4–11)

## 2024-04-24 PROCEDURE — 81003 URINALYSIS AUTO W/O SCOPE: CPT

## 2024-04-24 PROCEDURE — 82570 ASSAY OF URINE CREATININE: CPT

## 2024-04-24 PROCEDURE — 82607 VITAMIN B-12: CPT

## 2024-04-24 PROCEDURE — 80061 LIPID PANEL: CPT

## 2024-04-24 PROCEDURE — 84156 ASSAY OF PROTEIN URINE: CPT

## 2024-04-24 PROCEDURE — 86160 COMPLEMENT ANTIGEN: CPT

## 2024-04-24 PROCEDURE — 84466 ASSAY OF TRANSFERRIN: CPT

## 2024-04-24 PROCEDURE — 82728 ASSAY OF FERRITIN: CPT

## 2024-04-24 PROCEDURE — 86225 DNA ANTIBODY NATIVE: CPT

## 2024-04-24 PROCEDURE — 86140 C-REACTIVE PROTEIN: CPT

## 2024-04-24 PROCEDURE — 86038 ANTINUCLEAR ANTIBODIES: CPT

## 2024-04-24 PROCEDURE — 84450 TRANSFERASE (AST) (SGOT): CPT

## 2024-04-24 PROCEDURE — 85652 RBC SED RATE AUTOMATED: CPT

## 2024-04-24 PROCEDURE — 84460 ALANINE AMINO (ALT) (SGPT): CPT

## 2024-04-24 PROCEDURE — 82746 ASSAY OF FOLIC ACID SERUM: CPT

## 2024-04-24 PROCEDURE — 85025 COMPLETE CBC W/AUTO DIFF WBC: CPT

## 2024-04-24 PROCEDURE — 83540 ASSAY OF IRON: CPT

## 2024-04-24 PROCEDURE — 99499 UNLISTED E&M SERVICE: CPT | Performed by: INTERNAL MEDICINE

## 2024-04-24 PROCEDURE — 36415 COLL VENOUS BLD VENIPUNCTURE: CPT

## 2024-04-24 PROCEDURE — 84443 ASSAY THYROID STIM HORMONE: CPT

## 2024-04-24 PROCEDURE — 99204 OFFICE O/P NEW MOD 45 MIN: CPT | Performed by: INTERNAL MEDICINE

## 2024-04-24 RX ORDER — HYDROXYCHLOROQUINE SULFATE 200 MG/1
400 TABLET, FILM COATED ORAL DAILY
Qty: 180 TABLET | Refills: 1 | Status: SHIPPED | OUTPATIENT
Start: 2024-04-24

## 2024-04-24 RX ORDER — AMITRIPTYLINE HYDROCHLORIDE 10 MG/1
TABLET, FILM COATED ORAL
Qty: 166 TABLET | Refills: 1 | Status: SHIPPED | OUTPATIENT
Start: 2024-04-24 | End: 2024-07-23

## 2024-04-24 NOTE — PATIENT INSTRUCTIONS
Increase hydroxychloroquine (plaquenil) to 2 pills daily. Do this for 6 months, then will try to decrease the dose.     amitriptyline 10 MG Oral Tab; Take 1 tablet (10 mg total) by mouth nightly for 15 days, THEN 2 tablets (20 mg total) nightly.    Get blood work and X-rays of the hands

## 2024-04-24 NOTE — PROGRESS NOTES
Rheumatology New Patient Note  =====================================================================================================    Date of visit: 4/24/2024  ?  Chief complaint: undifferentiated connective tissue disease (UCTD)/SLE   Chief Complaint   Patient presents with    Lupus     New patient patient states she has hands weakness and pain c/o lower back and shoulder pain and stiffness patient rates her pain level a 7. Rapid 3 score is a 5.0.     Referring (will send letter)  PCP  Kendra Perez MD  Fax: [phone]  Phone: [phone]    =====================================================================================================  HPI    Shannon Huntley is a 68 year old female     Patient here for further evaluation and management of UCTD.  Previously seeing Dr. Urbina.  Diagnosed with systemic autoimmune disease in 2018 based on a one-time positive EVERT by IFA, positive SSB.  Also with leukopenia.  Ultrasound of the left hand indicated possible synovitis of the first 2 MCPs.  Has been on hydroxychloroquine 200 mg daily since then.  Unclear if this is beneficial.  Seeing ophthalmologist every 6 months.  -Dry mouth: chronic. A few cavities here and there.   -Dry eye: on restasis.   -notes alopecia, diffuse    Chronic polyarthralgia. Affecting Neck/shoulders/hand.  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  History of biceps tendon repair on the right.  -Worked as a  in the past.  -Did not tolerate gabapentin, lyrica, duloxetine  -Takes ibuprofen 800 mg TID prn. Takes sparingly. Takes T3 sparingly.     Hx of OP. Needs to schedule reclast    Fhx: mother with possible lupus    Denies current alopecia, malar rash, photosensitivity rash, discoid lesions, oral/nasal ulcers, pleuritic chest pain, arthritis, seizures/psychosis, Raynaud's,or blood clots.    Denies miscarriages or obstetric events (early pre-eclampsia, IUGR, placental  insufficiency)  -Prior pregnancies and/or children: 1 child  --Pregnancy complications: non  --miscarriage at 3 months in the past.       14 point ROS negative except noted above    Medications:  Current Outpatient Medications on File Prior to Visit   Medication Sig Dispense Refill    ibuprofen 800 MG Oral Tab Take 1 tablet (800 mg total) by mouth 3 (three) times daily. 90 tablet 3    amLODIPine 2.5 MG Oral Tab Take 1 tablet (2.5 mg total) by mouth every evening. 90 tablet 3    omeprazole 20 MG Oral Capsule Delayed Release Take 1 capsule (20 mg total) by mouth before breakfast. 90 capsule 0    atorvastatin 10 MG Oral Tab Take 1 tablet (10 mg total) by mouth daily. 90 tablet 1    conjugated estrogens 0.625 MG/GM Vaginal Cream Apply twice weekly 30 g 3    fluticasone propionate 50 MCG/ACT Nasal Suspension 2 sprays by Each Nare route daily. 3 each 3    polyethylene glycol, PEG 3350, 17 g Oral Powd Pack Take 17 g by mouth daily as needed (constipation).      acetaminophen-codeine (TYLENOL WITH CODEINE #3) 300-30 MG Oral Tab Take 1 tablet by mouth 2 (two) times daily as needed for Pain (back pain). 60 tablet 0    Cholecalciferol (VITAMIN D3 OR) Take 2,000 Units by mouth daily.      RESTASIS 0.05 % Ophthalmic Emulsion Place 1 drop into both eyes every 12 (twelve) hours.  6     No current facility-administered medications on file prior to visit.       Past Medical History:  Past Medical History:    Abdominal pain, right upper quadrant    Anxiety state, unspecified    Arthritis    Back pain    Biceps muscle tear    Carpal tunnel syndrome    Depression    Dyspareunia    H/O mammogram    Negative    Osteoarthrosis, unspecified whether generalized or localized, unspecified site    Osteopenia    Osteopenia, unspecified location    Osteoporosis    Pain in joints    PONV (postoperative nausea and vomiting)    Rotator cuff syndrome    Spinal cord tumor    c2 intraspinal tumor with cord compression, neurilemmoma      Undifferentiated connective tissue disease (HCC)    Visual impairment    glasses and contacts    Wears glasses     Past Surgical History:  Past Surgical History:   Procedure Laterality Date    Anesth,shoulder replacement Right 2019    Right reverse total shoulder arthroplasty- Dr. Vaca    Back surgery       cer tumors removed    Colonoscopy      Colonoscopy      Hysterectomy      RUKHSANA and BSO, fibroids,     Hysterectomy      Other surgical history      Other surgical history  2008    R toe artificial cartilage, straighten out 4th toe    Other surgical history  2016    Cervical spine fusion    Repair rotator cuff,chronic  ,    (bilateral)    Revise median n/carpal tunnel surg      Rotator cuff repair      Surgery - external  2007    C-spine surgery for Intraspinal tumor     Family History:  Family History   Problem Relation Age of Onset    Other (Unknown) Father     Hypertension Mother     Heart Disease Mother     Other (atherosclerosis, PTCA, pacemaker) Mother     Other (lupus) Mother     Heart Disease Maternal Grandmother     Ovarian Cancer Sister 60    Other (Anemia, MI, alcoholic cirrhosis) Other         6 siblings    Cancer Neg     Stroke Neg      Social History:  Social History     Socioeconomic History    Marital status:     Number of children: 1   Occupational History    Occupation: Homemaker     Comment: Off work since  due to disability   Tobacco Use    Smoking status: Former     Current packs/day: 0.00     Average packs/day: 1 pack/day for 32.0 years (32.0 ttl pk-yrs)     Types: Cigarettes     Start date: 3/20/1971     Quit date: 3/20/2003     Years since quittin.1    Smokeless tobacco: Never   Vaping Use    Vaping status: Never Used   Substance and Sexual Activity    Alcohol use: No     Alcohol/week: 0.0 standard drinks of alcohol    Drug use: Not Currently     Comment: in her 20s she used many drugs but never IVDU    Sexual activity:  Yes     Partners: Male     Birth control/protection: Hysterectomy   Other Topics Concern    Caffeine Concern Yes     Comment: 1 cup of coffee daily    Stress Concern No    Weight Concern No    Special Diet No    Exercise No    Seat Belt Yes     Social Determinants of Health      Received [facility], [facility]    Social Connections    Received from [facility], [facility]    Housing Stability     ?  Allergies:  Allergies   Allergen Reactions    Grass HIVES    Fentanyl NAUSEA AND VOMITING    Morphine NAUSEA AND VOMITING         Objective    Vitals:    04/24/24 1300   BP: 148/70   BP Location: Right arm   Patient Position: Sitting   Pulse: 68   Resp: 16   SpO2: 98%   Weight: 105 lb (47.6 kg)   Height: 4' 9\" (1.448 m)       GEN: NAD, well-nourished.   HEENT: Head: NCAT. Face: No lesions. Eyes: Conjunctiva clear. Sclera are anicteric. PERRLA. EOMs are full. Ears: The right and left ear canals are clear.  Nose: No external or internal nasal deformities. Nasal septum is midline. Mouth: The lips are within normal limits.  No oral ulcers Tongue is midline with no lesions. The oral cavity is clear.   Neck: Supple. No neck masses. No thyromegaly. No LAD, parotid or submandicular gland palpated.   CV: RRR, no mrg, S1/S2  PULM: CTAB, no wrr, easy effort  Extremities: No cyanosis, edema or deformities.   Neurologic: Strength, CN2-12 grossly intact   Psych: normal affect.   Skin: No lesions or rashes.  MSK: 28 joint count performed. No evidence of synovitis in mcp, pip, dip, wrist, elbows, shoulders, hips, knees, ankles, mtp unless otherwise noted. Full ROM of elbows, wrists, knees.    Hands- No ulceration/lesions noted. No swelling in IPJs, no TTP or synovitis noted in joints of hand   Wrists- No pain with wrist flexion and extension. No swelling, erythema, or increased warmth.   Elbows- No swelling, erythema, or increased warmth.   Shoulders-  FROM, abduction ~180 degrees bilaterally.    Knees- No swelling, erythema, or increased warmth. AROM flexion/extension ~0-180 degrees.    No valgus/varus laxities appreciated.   Ankles/Feet- No swelling, erythema, or increased warmth.    SJ and TJ: 3 (LMCP1-3)    -Positive painful arc bilaterally of the bilateral shoulders  -Tender to palpation in the cervical trapezius, lumbar paraspinals.  -  Labs:  Lab Results   Component Value Date    WBC 4.2 04/24/2024    RBC 4.25 04/24/2024    HGB 12.8 04/24/2024    HCT 38.2 04/24/2024    .0 04/24/2024    MPV 10.5 01/02/2013    MCV 89.9 04/24/2024    MCH 30.1 04/24/2024    MCHC 33.5 04/24/2024    RDW 12.9 04/24/2024    NEPRELIM 2.31 04/24/2024    NEPERCENT 54.4 04/24/2024    LYPERCENT 38.0 04/24/2024    MOPERCENT 6.4 04/24/2024    EOPERCENT 0.7 04/24/2024    BAPERCENT 0.5 04/24/2024    NE 2.31 04/24/2024    LYMABS 1.61 04/24/2024    MOABSO 0.27 04/24/2024    EOABSO 0.03 04/24/2024    BAABSO 0.02 04/24/2024     Lab Results   Component Value Date    GLU 90 03/22/2024    BUN 17 03/22/2024    BUNCREA 19.5 03/22/2024    CREATSERUM 0.87 03/22/2024    ANIONGAP 3 03/22/2024    GFR 80 02/14/2017    GFRNAA 77 03/17/2021    GFRAA 89 03/17/2021    CA 10.0 03/22/2024    OSMOCALC 299 (H) 03/22/2024    ALKPHO 70 04/14/2023    AST 26 04/24/2024    ALT 22 04/24/2024    BILT 0.4 04/14/2023    TP 6.9 04/14/2023    ALB 3.4 04/14/2023    GLOBULIN 3.5 04/14/2023    AGRATIO 2.2 11/13/2012     03/22/2024    K 4.6 03/22/2024     03/22/2024    CO2 31.0 03/22/2024         Lab Results   Component Value Date    ANAS Negative 04/05/2016     Lab Results   Component Value Date    SSA <100 11/08/2018    ANTISSA <0.2 07/20/2021    SSB <100 11/08/2018    ANTISSB <0.2 07/20/2021     Lab Results   Component Value Date    DSDNA <100 01/19/2018    ANTIDSDNA <1 07/20/2021    ANTISM <0.2 07/20/2021    ANTIRNP <0.2 07/20/2021     Lab Results   Component Value Date    RGAEYTE15 <0.2 07/20/2021      Lab Results   Component Value Date    C3 102.0 04/24/2024    C4 24.8 04/24/2024     No results found for: \"DRVVT\", \"LAINT\", \"PTTLUPUS\", \"LUPUSINTERP\", \"LA\", \"V3HO0AHHSV\", \"A7ZL5HNETS\", \"Q6RZPJGUFE\", \"D2ZGJVBFKE\"  No results found for: \"CARDIOLIPIGG\", \"CARDIOLIPIGM\", \"CARDIOLIPIGA\", \"CARDIOIGA\", \"CARLIP\"      Additional Labs:    Radiology:    Bilateral US hands 2018  IMPRESSION:   1. Synovial hypertrophy at the left first and second MCP joint with grade 1-2 hyperemia as above.   2. No sonographic evidence of active synovitis in the right hand.     Radiology review:      =====================================================================================================  Assessment and Plan    Assessment:  1. Undifferentiated connective tissue disease (HCC)    2. Alopecia    3. Inflammatory polyarthritis (HCC)    4. Chronic pain syndrome    5. Low vitamin D level    6. Bilateral hand pain    7. Bilateral shoulder region arthritis    8. Osteoarthritis of lumbar spine, unspecified spinal osteoarthritis complication status    9. Osteoarthritis of cervical spine, unspecified spinal osteoarthritis complication status    10. Osteoarthritis of thoracic spine, unspecified spinal osteoarthritis complication status    11. Therapeutic drug monitoring        #undifferentiated connective tissue disease (UCTD): diagnosed in 2018 by prior rheumatologist.  --Relevant Clinical Manifestations: Dry eye/mouth, inflammatory arthritis  --Serologies/Laboratory findings: +EVERT 1:640 homogenous, SSB, leukopenia  --Inflammatory arthritis mildly active today.  Otherwise, systemic autoimmune disease appears to be fairly well-controlled.    #Chronic musculoskeletal pain: DJD noted in the cervical, thoracic, lumbar spine based on prior imaging.  S/p cervical spine fusion, C3-6.  Also with bilateral rotator cuff pathology with prior right biceps tendon repair on the right.  -Did not tolerate gabapentin, lyrica, duloxetine  #Poor, fragmented  sleep  # Prior miscarriage at 3 months: APLS labs negative  #Alopecia: Frontal predominant pattern raises concern for traction versus androgenetic alopecia.    Plan:  -undifferentiated connective tissue disease (UCTD) monitoring labs: CMP, CBC w/ diff, ESR, CRP, , c3/c4, urine protein/cr, U/A  -Repeat EVERT by ALIDA reflex.  -Labs to further adjudicate chronic pain/alopecia and/or high risk medication use baseline labs: vitamin B12, ferritin, TSH    XR hands bilateral to evaluate for erosions    -Increase hydroxychloroquine (plaquenil) to 2 pills daily. Do this for 6 months, then will try to decrease the dose to 5 mg/kg. Continue f/u with ophtho    -amitriptyline 10 MG Oral Tab; Take 1 tablet (10 mg total) by mouth nightly for 15 days, THEN 2 tablets (20 mg total) nightly.    Physical therapy referral for the above mechanical issues    Patient will schedule reclast for her OP    Diagnoses and all orders for this visit:    Undifferentiated connective tissue disease (HCC)  -     hydroxychloroquine (PLAQUENIL) 200 MG Oral Tab; Take 2 tablets (400 mg total) by mouth daily.  -     TSH W Reflex To Free T4; Future  -     Iron And Tibc; Future  -     Ferritin; Future  -     B12 AND FOLATE; Future  -     Complement C3, Serum; Future  -     Complement C4, Serum; Future  -     CBC With Differential With Platelet; Future  -     Sed Rate, Westergren (Automated); Future  -     C-Reactive Protein; Future  -     Urinalysis with Culture Reflex; Future  -     Protein,Total,Urine, Random; Future  -     Creatinine, Urine, Random; Future  -     Connective Tissue Disease (EVERT) Screen, Reflex Specific Antibody; Future  -     Physical Therapy Referral - [facility]    Alopecia  -     hydroxychloroquine (PLAQUENIL) 200 MG Oral Tab; Take 2 tablets (400 mg total) by mouth daily.  -     TSH W Reflex To Free T4; Future  -     Iron And Tibc; Future  -     Ferritin; Future  -     B12 AND FOLATE; Future  -     Complement C3, Serum; Future  -      Complement C4, Serum; Future  -     CBC With Differential With Platelet; Future  -     Sed Rate, Westergren (Automated); Future  -     C-Reactive Protein; Future  -     Urinalysis with Culture Reflex; Future  -     Protein,Total,Urine, Random; Future  -     Creatinine, Urine, Random; Future  -     Connective Tissue Disease (EVERT) Screen, Reflex Specific Antibody; Future  -     Physical Therapy Referral - [facility]    Inflammatory polyarthritis (HCC)  -     hydroxychloroquine (PLAQUENIL) 200 MG Oral Tab; Take 2 tablets (400 mg total) by mouth daily.  -     XR HAND BILAT (MIN 3 VIEWS) (CPT=73130-50); Future  -     Physical Therapy Referral - [facility]    Chronic pain syndrome  -     hydroxychloroquine (PLAQUENIL) 200 MG Oral Tab; Take 2 tablets (400 mg total) by mouth daily.  -     amitriptyline 10 MG Oral Tab; Take 1 tablet (10 mg total) by mouth nightly for 14 days, THEN 2 tablets (20 mg total) nightly.  -     Physical Therapy Referral - [facility]    Low vitamin D level  -     hydroxychloroquine (PLAQUENIL) 200 MG Oral Tab; Take 2 tablets (400 mg total) by mouth daily.  -     TSH W Reflex To Free T4; Future  -     Iron And Tibc; Future  -     Ferritin; Future  -     B12 AND FOLATE; Future  -     Complement C3, Serum; Future  -     Complement C4, Serum; Future  -     CBC With Differential With Platelet; Future  -     Sed Rate, Westergren (Automated); Future  -     C-Reactive Protein; Future  -     Urinalysis with Culture Reflex; Future  -     Protein,Total,Urine, Random; Future  -     Creatinine, Urine, Random; Future  -     Connective Tissue Disease (EVERT) Screen, Reflex Specific Antibody; Future  -     Physical Therapy Referral - [facility]    Bilateral hand pain  -     XR HAND BILAT (MIN 3 VIEWS) (CPT=73130-50); Future  -     Physical Therapy Referral - [facility]    Bilateral shoulder region arthritis  -     Physical Therapy Referral - [facility]    Osteoarthritis of lumbar spine,  unspecified spinal osteoarthritis complication status  -     Physical Therapy Referral - [facility]    Osteoarthritis of cervical spine, unspecified spinal osteoarthritis complication status  -     Physical Therapy Referral - [facility]    Osteoarthritis of thoracic spine, unspecified spinal osteoarthritis complication status  -     Physical Therapy Referral - [facility]    Therapeutic drug monitoring        Return in about 4 months (around 8/24/2024).      The above plan of care, diagnosis, orders, and follow-up were discussed with the patient. Questions related to this recommended plan of care were answered.    Thank you for referring this delightful patient to me. Please feel free to contact me with any questions.     This report was performed utilizing speech recognition software technology. Despite proofreading, speech recognition errors could escape detection. If a word or phrase is confusing or out of context, please do not hesitate to call for   clarification.       Kind regards      Helen Feliz MD  [facility]

## 2024-04-25 LAB
DSDNA IGG SERPL IA-ACNC: <0.6 IU/ML
ENA AB SER QL IA: 0.2 UG/L
ENA AB SER QL IA: NEGATIVE

## 2024-04-30 ENCOUNTER — OFFICE VISIT (OUTPATIENT)
Dept: HEMATOLOGY/ONCOLOGY | Facility: HOSPITAL | Age: 69
End: 2024-04-30
Attending: INTERNAL MEDICINE
Payer: MEDICARE

## 2024-04-30 VITALS
HEART RATE: 102 BPM | RESPIRATION RATE: 16 BRPM | SYSTOLIC BLOOD PRESSURE: 109 MMHG | DIASTOLIC BLOOD PRESSURE: 76 MMHG | OXYGEN SATURATION: 98 % | WEIGHT: 105.63 LBS | TEMPERATURE: 98 F | BODY MASS INDEX: 23 KG/M2

## 2024-04-30 DIAGNOSIS — M81.0 AGE-RELATED OSTEOPOROSIS WITHOUT CURRENT PATHOLOGICAL FRACTURE: ICD-10-CM

## 2024-04-30 DIAGNOSIS — M81.0 OSTEOPOROSIS: Primary | ICD-10-CM

## 2024-04-30 LAB
ALBUMIN SERPL-MCNC: 3.4 G/DL (ref 3.4–5)
CALCIUM BLD-MCNC: 9.1 MG/DL (ref 8.5–10.1)
CREAT BLD-MCNC: 0.9 MG/DL
EGFRCR SERPLBLD CKD-EPI 2021: 70 ML/MIN/1.73M2 (ref 60–?)

## 2024-04-30 PROCEDURE — 36415 COLL VENOUS BLD VENIPUNCTURE: CPT

## 2024-04-30 PROCEDURE — 82310 ASSAY OF CALCIUM: CPT

## 2024-04-30 PROCEDURE — 96365 THER/PROPH/DIAG IV INF INIT: CPT

## 2024-04-30 PROCEDURE — 82565 ASSAY OF CREATININE: CPT

## 2024-04-30 PROCEDURE — 82040 ASSAY OF SERUM ALBUMIN: CPT

## 2024-04-30 RX ORDER — ZOLEDRONIC ACID 5 MG/100ML
5 INJECTION, SOLUTION INTRAVENOUS ONCE
Status: CANCELLED | OUTPATIENT
Start: 2024-04-30

## 2024-04-30 RX ORDER — ZOLEDRONIC ACID 5 MG/100ML
5 INJECTION, SOLUTION INTRAVENOUS ONCE
Status: COMPLETED | OUTPATIENT
Start: 2024-04-30 | End: 2024-04-30

## 2024-04-30 RX ADMIN — ZOLEDRONIC ACID 5 MG: 5 INJECTION, SOLUTION INTRAVENOUS at 13:45:00

## 2024-04-30 NOTE — PROGRESS NOTES
Education Record    Learner:  Patient    Disease / Diagnosis: Here for Reclast     Barriers / Limitations:  None    Method:  Brief focused, printed material and  reinforcement    General Topics:  Plan of care reviewed    Outcome:  Shows understanding. Pt labs drawn on site. Infusion tolerated without issue. Left in stable condition.

## 2024-08-07 ENCOUNTER — APPOINTMENT (OUTPATIENT)
Dept: GENERAL RADIOLOGY | Age: 69
End: 2024-08-07
Attending: EMERGENCY MEDICINE
Payer: MEDICARE

## 2024-08-07 ENCOUNTER — HOSPITAL ENCOUNTER (OUTPATIENT)
Age: 69
Discharge: HOME | End: 2024-08-07
Attending: EMERGENCY MEDICINE
Payer: MEDICARE

## 2024-08-07 VITALS
SYSTOLIC BLOOD PRESSURE: 121 MMHG | OXYGEN SATURATION: 100 % | WEIGHT: 109 LBS | DIASTOLIC BLOOD PRESSURE: 98 MMHG | HEART RATE: 87 BPM | RESPIRATION RATE: 16 BRPM | HEIGHT: 57 IN | BODY MASS INDEX: 23.51 KG/M2 | TEMPERATURE: 99 F

## 2024-08-07 DIAGNOSIS — S92.501A CLOSED FRACTURE OF PHALANX OF RIGHT FIFTH TOE, INITIAL ENCOUNTER: Primary | ICD-10-CM

## 2024-08-07 DIAGNOSIS — S93.401A SPRAIN OF RIGHT ANKLE, UNSPECIFIED LIGAMENT, INITIAL ENCOUNTER: ICD-10-CM

## 2024-08-07 PROCEDURE — 73630 X-RAY EXAM OF FOOT: CPT | Performed by: EMERGENCY MEDICINE

## 2024-08-07 PROCEDURE — 28510 TREATMENT OF TOE FRACTURE: CPT

## 2024-08-07 PROCEDURE — 99213 OFFICE O/P EST LOW 20 MIN: CPT

## 2024-08-07 PROCEDURE — 99214 OFFICE O/P EST MOD 30 MIN: CPT

## 2024-08-07 PROCEDURE — 73610 X-RAY EXAM OF ANKLE: CPT | Performed by: EMERGENCY MEDICINE

## 2024-08-07 NOTE — ED INITIAL ASSESSMENT (HPI)
Pt. Sprained her right ankle on Sunday, states she tripped on a pebble and fell to the ground. States she can walk on the foot but it is painful.

## 2024-08-07 NOTE — ED PROVIDER NOTES
Patient Seen in: [facility]      History     Chief Complaint   Patient presents with    Leg or Foot Injury     Stated Complaint: Ankle Injury    Subjective:   HPI    67 yo female rolled her right ankle on Sunday. C/o persistent pain to the ankle and foot. She is able to ambulate but it is painful.     Objective:   Past Medical History:    Abdominal pain, right upper quadrant    Anxiety state, unspecified    Arthritis    Back pain    Biceps muscle tear    Carpal tunnel syndrome    Depression    Dyspareunia    H/O mammogram    Negative    Osteoarthrosis, unspecified whether generalized or localized, unspecified site    Osteopenia    Osteopenia, unspecified location    Osteoporosis    Pain in joints    PONV (postoperative nausea and vomiting)    Rotator cuff syndrome    Spinal cord tumor    c2 intraspinal tumor with cord compression, neurilemmoma     Undifferentiated connective tissue disease (HCC)    Visual impairment    glasses and contacts    Wears glasses              Past Surgical History:   Procedure Laterality Date    Anesth,shoulder replacement Right 05/30/2019    Right reverse total shoulder arthroplasty- Dr. Vaca    Back surgery  2007     cer tumors removed    Colonoscopy  2010    Colonoscopy      Hysterectomy  1985    RUKHSANA and BSO, fibroids, 1987    Hysterectomy      Other surgical history  2006    Other surgical history  1/2008    R toe artificial cartilage, straighten out 4th toe    Other surgical history  04/2016    Cervical spine fusion    Repair rotator cuff,chronic  2001,2003    (bilateral)    Revise median n/carpal tunnel surg  2000    Rotator cuff repair      Surgery - external  September 2007    C-spine surgery for Intraspinal tumor                Social History     Socioeconomic History    Marital status:     Number of children: 1   Occupational History    Occupation: Homemaker     Comment: Off work since 2007 due to disability   Tobacco Use    Smoking status: Former      Current packs/day: 0.00     Average packs/day: 1 pack/day for 32.0 years (32.0 ttl pk-yrs)     Types: Cigarettes     Start date: 3/20/1971     Quit date: 3/20/2003     Years since quittin.4    Smokeless tobacco: Never   Vaping Use    Vaping status: Never Used   Substance and Sexual Activity    Alcohol use: No     Alcohol/week: 0.0 standard drinks of alcohol    Drug use: Not Currently     Comment: in her 20s she used many drugs but never IVDU    Sexual activity: Yes     Partners: Male     Birth control/protection: Hysterectomy   Other Topics Concern    Caffeine Concern Yes     Comment: 1 cup of coffee daily    Stress Concern No    Weight Concern No    Special Diet No    Exercise No    Seat Belt Yes     Social Determinants of Health      [facility]    Social Connections    [facility]    Housing Stability              Review of Systems    Positive for stated Chief Complaint: Leg or Foot Injury    Other systems are as noted in HPI.  Constitutional and vital signs reviewed.      All other systems reviewed and negative except as noted above.    Physical Exam     ED Triage Vitals [24 0843]   BP (!) 121/98   Pulse 87   Resp 16   Temp 98.7 °F (37.1 °C)   Temp src Oral   SpO2 100 %   O2 Device None (Room air)       Current Vitals:   Vital Signs  BP: (!) 121/98  Pulse: 87  Resp: 16  Temp: 98.7 °F (37.1 °C)  Temp src: Oral    Oxygen Therapy  SpO2: 100 %  O2 Device: None (Room air)            Physical Exam  Vitals and nursing note reviewed.   Constitutional:       Appearance: Normal appearance. She is well-developed.   HENT:      Head: Normocephalic and atraumatic.   Cardiovascular:      Rate and Rhythm: Normal rate and regular rhythm.   Pulmonary:      Effort: Pulmonary effort is normal. No respiratory distress.   Musculoskeletal:      Comments: Right lower leg:   No focal knee  tenderness.   Mild swelling to the lateral ankle, tender to palpation.   Mild tenderness to latera mid foot area.   Strong DP pulse. No motor or sensory deficit.    Skin:     General: Skin is warm and dry.      Capillary Refill: Capillary refill takes less than 2 seconds.   Neurological:      General: No focal deficit present.      Mental Status: She is alert.      Sensory: No sensory deficit.   Psychiatric:         Mood and Affect: Mood normal.         Behavior: Behavior normal.              ED Course   Labs Reviewed - No data to display                   MDM                                      Medical Decision Making  Fracture, sprain, contusion all in differential. Ankle xray images reviewed by myself and are normal. Foot xray images reviewed by myself. Concerning subtle fifth toe fracture noted. Patient is tender in that area. She is able to ambulate without diffiulty and able to wear her own supportive gym shoe. No further intervention needed. Discharge on otc ibuprofen 400mg every six hours as needed.     Disposition and Plan     Clinical Impression:  1. Closed fracture of phalanx of right fifth toe, initial encounter    2. Sprain of right ankle, unspecified ligament, initial encounter         Disposition:  Discharge  8/7/2024  9:31 am    Follow-up:  Kendra Perez MD  [address] [address] [phone]      As needed          Medications Prescribed:  Current Discharge Medication List

## 2024-08-27 ENCOUNTER — OFFICE VISIT (OUTPATIENT)
Dept: RHEUMATOLOGY | Facility: CLINIC | Age: 69
End: 2024-08-27
Payer: MEDICARE

## 2024-08-27 VITALS
TEMPERATURE: 97 F | WEIGHT: 110.81 LBS | RESPIRATION RATE: 16 BRPM | HEART RATE: 102 BPM | DIASTOLIC BLOOD PRESSURE: 70 MMHG | HEIGHT: 57 IN | BODY MASS INDEX: 23.9 KG/M2 | SYSTOLIC BLOOD PRESSURE: 134 MMHG | OXYGEN SATURATION: 97 %

## 2024-08-27 DIAGNOSIS — M35.9 UNDIFFERENTIATED CONNECTIVE TISSUE DISEASE (HCC): Primary | ICD-10-CM

## 2024-08-27 DIAGNOSIS — M79.641 PAIN IN BOTH HANDS: ICD-10-CM

## 2024-08-27 DIAGNOSIS — G89.4 CHRONIC PAIN SYNDROME: ICD-10-CM

## 2024-08-27 DIAGNOSIS — R79.89 LOW VITAMIN D LEVEL: ICD-10-CM

## 2024-08-27 DIAGNOSIS — M79.642 PAIN IN BOTH HANDS: ICD-10-CM

## 2024-08-27 DIAGNOSIS — L65.9 ALOPECIA: ICD-10-CM

## 2024-08-27 DIAGNOSIS — M35.00 SJOGREN'S SYNDROME, WITH UNSPECIFIED ORGAN INVOLVEMENT (HCC): ICD-10-CM

## 2024-08-27 DIAGNOSIS — M06.4 INFLAMMATORY POLYARTHRITIS (HCC): ICD-10-CM

## 2024-08-27 PROCEDURE — 99214 OFFICE O/P EST MOD 30 MIN: CPT | Performed by: INTERNAL MEDICINE

## 2024-08-27 PROCEDURE — G2211 COMPLEX E/M VISIT ADD ON: HCPCS | Performed by: INTERNAL MEDICINE

## 2024-08-27 RX ORDER — AMITRIPTYLINE HYDROCHLORIDE 10 MG/1
10 TABLET ORAL
COMMUNITY
Start: 2024-07-19 | End: 2024-08-27

## 2024-08-27 RX ORDER — AMITRIPTYLINE HYDROCHLORIDE 10 MG/1
30 TABLET ORAL NIGHTLY
Qty: 270 TABLET | Refills: 1 | Status: SHIPPED | OUTPATIENT
Start: 2024-08-27

## 2024-08-27 RX ORDER — PILOCARPINE HYDROCHLORIDE 5 MG/1
TABLET, FILM COATED ORAL
Qty: 249 TABLET | Refills: 1 | Status: SHIPPED | OUTPATIENT
Start: 2024-08-27 | End: 2024-11-24

## 2024-08-27 RX ORDER — HYDROXYCHLOROQUINE SULFATE 200 MG/1
400 TABLET, FILM COATED ORAL DAILY
Qty: 180 TABLET | Refills: 2 | Status: SHIPPED | OUTPATIENT
Start: 2024-08-27

## 2024-08-27 NOTE — PROGRESS NOTES
Rheumatology f/u Patient Note  =====================================================================================================    Chief complaint: undifferentiated connective tissue disease (UCTD)/SLE   Chief Complaint   Patient presents with    Follow - Up     LOV 4/24/2024     PCP  Kendra Perez MD  Fax: [phone]  Phone: [phone]    =====================================================================================================  HPI Date of visit: 4/24/2024    Shannon Huntley is a 68 year old female     Patient here for further evaluation and management of UCTD.  Previously seeing Dr. Urbina.  Diagnosed with systemic autoimmune disease in 2018 based on a one-time positive EVERT by IFA, positive SSB.  Also with leukopenia.  Ultrasound of the left hand indicated possible synovitis of the first 2 MCPs.  Has been on hydroxychloroquine 200 mg daily since then.  Unclear if this is beneficial.  Seeing ophthalmologist every 6 months.  -Dry mouth: chronic. A few cavities here and there.   -Dry eye: on restasis.   -notes alopecia, diffuse  Chronic polyarthralgia. Affecting Neck/shoulders/hand.  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  History of biceps tendon repair on the right.  -Worked as a  in the past.  -Did not tolerate gabapentin, lyrica, duloxetine  -Takes ibuprofen 800 mg TID prn. Takes sparingly. Takes T3 sparingly.   Hx of OP. Needs to schedule reclast  Fhx: mother with possible lupus  Denies current alopecia, malar rash, photosensitivity rash, discoid lesions, oral/nasal ulcers, pleuritic chest pain, arthritis, seizures/psychosis, Raynaud's,or blood clots.  Denies miscarriages or obstetric events (early pre-eclampsia, IUGR, placental insufficiency)  -Prior pregnancies and/or children: 1 child  --Pregnancy complications: non  --miscarriage at 3 months in the  past.  ==============================================================================================================  Today's Visit: 08/27/24     left her. Gaining weight. Had to start working again.      Left shoulder: bothering her. Last CSI was in 12/2023. Posterior approach. By Dr. Vaca     Amitriptyline: 2 pills nightly. Helping with sleep partially.     On hydroxychloroquine (plaquenil) 400 mg daily.     Dry mouth noted. Trying to find new dentist.   Dry eye: using restasis.       5 point ROS negative except noted above  I had reviewed past medical and family histories together with allergy and medication lists documented.      Medications:  Current Outpatient Medications on File Prior to Visit   Medication Sig Dispense Refill    ibuprofen 800 MG Oral Tab Take 1 tablet (800 mg total) by mouth 3 (three) times daily. 90 tablet 3    amLODIPine 2.5 MG Oral Tab Take 1 tablet (2.5 mg total) by mouth every evening. 90 tablet 3    omeprazole 20 MG Oral Capsule Delayed Release Take 1 capsule (20 mg total) by mouth before breakfast. 90 capsule 0    atorvastatin 10 MG Oral Tab Take 1 tablet (10 mg total) by mouth daily. 90 tablet 1    conjugated estrogens 0.625 MG/GM Vaginal Cream Apply twice weekly 30 g 3    fluticasone propionate 50 MCG/ACT Nasal Suspension 2 sprays by Each Nare route daily. 3 each 3    polyethylene glycol, PEG 3350, 17 g Oral Powd Pack Take 17 g by mouth daily as needed (constipation).      acetaminophen-codeine (TYLENOL WITH CODEINE #3) 300-30 MG Oral Tab Take 1 tablet by mouth 2 (two) times daily as needed for Pain (back pain). 60 tablet 0    Cholecalciferol (VITAMIN D3 OR) Take 2,000 Units by mouth daily.      RESTASIS 0.05 % Ophthalmic Emulsion Place 1 drop into both eyes every 12 (twelve) hours.  6     No current facility-administered medications on file prior to visit.     ?  Allergies:  Allergies   Allergen Reactions    Grass HIVES    Fentanyl NAUSEA AND VOMITING    Morphine NAUSEA AND  VOMITING         Objective    Vitals:    08/27/24 1237   BP: 134/70   Pulse: 102   Resp: 16   Temp: 97.2 °F (36.2 °C)   SpO2: 97%   Weight: 110 lb 12.8 oz (50.3 kg)   Height: 4' 9\" (1.448 m)     GEN: NAD, well-nourished.   HEENT: Head: NCAT. Face: No lesions. Eyes: Conjunctiva clear. Sclera are anicteric. PERRLA. EOMs are full.   CV: RRR, no mrg, S1/S2  PULM:  CTAB, easy effort  Extremities: No cyanosis, edema or deformities.   Neurologic: Strength, CN2-12 grossly intact   Psych: normal affect.   Skin: No lesions or rashes.  MSK: 28 joint count performed. No evidence of synovitis in mcp, pip, dip, wrist, elbows, shoulders, hips, knees, ankles, mtp unless otherwise noted. Full ROM of elbows, wrists, knees.       SJ and TJ: Left MCP 2, 3    L>R DIP2,3 nodule in DIPs    -Positive painful arc bilaterally of the bilateral shoulders, especially of the left shoulder    Labs:  Lab Results   Component Value Date    WBC 4.2 04/24/2024    RBC 4.25 04/24/2024    HGB 12.8 04/24/2024    HCT 38.2 04/24/2024    .0 04/24/2024    MPV 10.5 01/02/2013    MCV 89.9 04/24/2024    MCH 30.1 04/24/2024    MCHC 33.5 04/24/2024    RDW 12.9 04/24/2024    NEPRELIM 2.31 04/24/2024    NEPERCENT 54.4 04/24/2024    LYPERCENT 38.0 04/24/2024    MOPERCENT 6.4 04/24/2024    EOPERCENT 0.7 04/24/2024    BAPERCENT 0.5 04/24/2024    NE 2.31 04/24/2024    LYMABS 1.61 04/24/2024    MOABSO 0.27 04/24/2024    EOABSO 0.03 04/24/2024    BAABSO 0.02 04/24/2024     Lab Results   Component Value Date    GLU 90 03/22/2024    BUN 17 03/22/2024    BUNCREA 19.5 03/22/2024    CREATSERUM 0.90 04/30/2024    ANIONGAP 3 03/22/2024    GFR 80 02/14/2017    GFRNAA 77 03/17/2021    GFRAA 89 03/17/2021    CA 9.1 04/30/2024    OSMOCALC 299 (H) 03/22/2024    ALKPHO 70 04/14/2023    AST 26 04/24/2024    ALT 22 04/24/2024    BILT 0.4 04/14/2023    TP 6.9 04/14/2023    ALB 3.4 04/30/2024    GLOBULIN 3.5 04/14/2023    AGRATIO 2.2 11/13/2012     03/22/2024    K 4.6  03/22/2024     03/22/2024    CO2 31.0 03/22/2024         Lab Results   Component Value Date    ANAS Negative 04/05/2016     Lab Results   Component Value Date    SSA <100 11/08/2018    ANTISSA <0.2 07/20/2021    SSB <100 11/08/2018    ANTISSB <0.2 07/20/2021     Lab Results   Component Value Date    DSDNA <100 01/19/2018    ANTIDSDNA <1 07/20/2021    ANTISM <0.2 07/20/2021    ANTIRNP <0.2 07/20/2021     Lab Results   Component Value Date    FMKKVTM77 <0.2 07/20/2021     Lab Results   Component Value Date    C3 102.0 04/24/2024    C4 24.8 04/24/2024     No results found for: \"DRVVT\", \"LAINT\", \"PTTLUPUS\", \"LUPUSINTERP\", \"LA\", \"C4UA2SQQSC\", \"P8YF5ZEELH\", \"J8UMXWEOQS\", \"K2IGNSJMIW\"  No results found for: \"CARDIOLIPIGG\", \"CARDIOLIPIGM\", \"CARDIOLIPIGA\", \"CARDIOIGA\", \"CARLIP\"      Additional Labs:    Radiology:    Bilateral US hands 2018  IMPRESSION:   1. Synovial hypertrophy at the left first and second MCP joint with grade 1-2 hyperemia as above.   2. No sonographic evidence of active synovitis in the right hand.     Radiology review:      =====================================================================================================  Assessment and Plan    Assessment:  1. Undifferentiated connective tissue disease (HCC)    2. Sjogren's syndrome, with unspecified organ involvement (HCC)    3. Pain in both hands    4. Alopecia    5. Inflammatory polyarthritis (HCC)    6. Chronic pain syndrome    7. Low vitamin D level          #undifferentiated connective tissue disease (UCTD): diagnosed in 2018 by prior rheumatologist.  --Relevant Clinical Manifestations: Dry eye/mouth, inflammatory arthritis  --Serologies/Laboratory findings: +EVERT 1:640 homogenous, SSB, leukopenia  --Inflammatory arthritis mildly active today.  This could represent a crystalline arthropathy.  Dry eyes/mouth worsening.  Otherwise, systemic autoimmune disease appears to be fairly well-controlled.    #Chronic musculoskeletal pain: DJD noted in  the cervical, thoracic, lumbar spine based on prior imaging.  S/p cervical spine fusion, C3-6.  Also with bilateral rotator cuff pathology with prior right biceps tendon repair on the right.  -Did not tolerate gabapentin, lyrica, duloxetine  #Poor, fragmented sleep  # Prior miscarriage at 3 months: APLS labs negative  #Alopecia: Frontal predominant pattern raises concern for traction versus androgenetic alopecia.    #brother had gout:     #osteoporosis:   -4/30/2024 reclast    Plan:  Repeat labs in 6 months  -undifferentiated connective tissue disease (UCTD) monitoring labs: CMP, CBC w/ diff, ESR, CRP, , c3/c4, urine protein/cr, U/A  -Repeat EVERT by ALIDA reflex.    Get x-ray hands bilateral to evaluate for erosions; if the x-rays are suspicious for gout, would consider a gout CT scan of the left hand    -continue hydroxychloroquine (plaquenil) to 2 pills daily.  Continue follow-up with ophthalmology.  Try decreasing dose in the future    -pilocarpine uptitration for dry mouth.     -amitriptyline 10 MG Oral Tab; increase to 30 mg at bedtime    Left shoulder corticosteroid injection, patient will get another with Dr. Vaca.    Physical therapy referral for the above mechanical issues previously ordered    Rtc 6 months     Diagnoses and all orders for this visit:    Undifferentiated connective tissue disease (HCC)  -     pilocarpine 5 MG Oral Tab; Take 1 tablet (5 mg total) by mouth at bedtime for 7 days, THEN 1 tablet (5 mg total) 2 (two) times a day for 7 days, THEN 1 tablet (5 mg total) 3 (three) times daily.  -     Uric Acid; Future  -     amitriptyline 10 MG Oral Tab; Take 3 tablets (30 mg total) by mouth nightly.  -     XR HAND BILAT (MIN 3 VIEWS) (CPT=73130-50); Future  -     Comp Metabolic Panel (14); Future  -     CBC With Differential With Platelet; Future  -     Monoclonal Protein Study; Future  -     Urinalysis with Culture Reflex; Future  -     Creatinine, Urine, Random; Future  -     Protein,Total,Urine,  Random; Future  -     C-Reactive Protein; Future  -     Sed Rate, Westergren (Automated); Future  -     Complement C3, Serum; Future  -     Complement C4, Serum; Future  -     Connective Tissue Disease (EVERT) Screen, Reflex Specific Antibody; Future  -     hydroxychloroquine (PLAQUENIL) 200 MG Oral Tab; Take 2 tablets (400 mg total) by mouth daily.    Sjogren's syndrome, with unspecified organ involvement (HCC)  -     pilocarpine 5 MG Oral Tab; Take 1 tablet (5 mg total) by mouth at bedtime for 7 days, THEN 1 tablet (5 mg total) 2 (two) times a day for 7 days, THEN 1 tablet (5 mg total) 3 (three) times daily.  -     Uric Acid; Future  -     amitriptyline 10 MG Oral Tab; Take 3 tablets (30 mg total) by mouth nightly.  -     XR HAND BILAT (MIN 3 VIEWS) (CPT=73130-50); Future  -     Comp Metabolic Panel (14); Future  -     CBC With Differential With Platelet; Future  -     Monoclonal Protein Study; Future  -     Urinalysis with Culture Reflex; Future  -     Creatinine, Urine, Random; Future  -     Protein,Total,Urine, Random; Future  -     C-Reactive Protein; Future  -     Sed Rate, Westergren (Automated); Future  -     Complement C3, Serum; Future  -     Complement C4, Serum; Future  -     Connective Tissue Disease (EVERT) Screen, Reflex Specific Antibody; Future    Pain in both hands  -     pilocarpine 5 MG Oral Tab; Take 1 tablet (5 mg total) by mouth at bedtime for 7 days, THEN 1 tablet (5 mg total) 2 (two) times a day for 7 days, THEN 1 tablet (5 mg total) 3 (three) times daily.  -     Uric Acid; Future  -     amitriptyline 10 MG Oral Tab; Take 3 tablets (30 mg total) by mouth nightly.  -     XR HAND BILAT (MIN 3 VIEWS) (CPT=73130-50); Future  -     Comp Metabolic Panel (14); Future  -     CBC With Differential With Platelet; Future  -     Monoclonal Protein Study; Future  -     Urinalysis with Culture Reflex; Future  -     Creatinine, Urine, Random; Future  -     Protein,Total,Urine, Random; Future  -     C-Reactive  Protein; Future  -     Sed Rate, Westergren (Automated); Future  -     Complement C3, Serum; Future  -     Complement C4, Serum; Future  -     Connective Tissue Disease (EVERT) Screen, Reflex Specific Antibody; Future    Alopecia  -     hydroxychloroquine (PLAQUENIL) 200 MG Oral Tab; Take 2 tablets (400 mg total) by mouth daily.    Inflammatory polyarthritis (HCC)  -     hydroxychloroquine (PLAQUENIL) 200 MG Oral Tab; Take 2 tablets (400 mg total) by mouth daily.    Chronic pain syndrome  -     hydroxychloroquine (PLAQUENIL) 200 MG Oral Tab; Take 2 tablets (400 mg total) by mouth daily.    Low vitamin D level  -     hydroxychloroquine (PLAQUENIL) 200 MG Oral Tab; Take 2 tablets (400 mg total) by mouth daily.          Return in about 6 months (around 2/27/2025).      The above plan of care, diagnosis, orders, and follow-up were discussed with the patient. Questions related to this recommended plan of care were answered.    Thank you for referring this delightful patient to me. Please feel free to contact me with any questions.     This report was performed utilizing speech recognition software technology. Despite proofreading, speech recognition errors could escape detection. If a word or phrase is confusing or out of context, please do not hesitate to call for   clarification.       Kind regards      Helen Feliz MD  [facility]

## 2024-08-28 ENCOUNTER — HOSPITAL ENCOUNTER (OUTPATIENT)
Dept: GENERAL RADIOLOGY | Age: 69
Discharge: HOME | End: 2024-08-28
Attending: INTERNAL MEDICINE
Payer: MEDICARE

## 2024-08-28 DIAGNOSIS — M79.641 PAIN IN BOTH HANDS: ICD-10-CM

## 2024-08-28 DIAGNOSIS — M35.9 UNDIFFERENTIATED CONNECTIVE TISSUE DISEASE (HCC): ICD-10-CM

## 2024-08-28 DIAGNOSIS — M35.00 SJOGREN'S SYNDROME, WITH UNSPECIFIED ORGAN INVOLVEMENT (HCC): ICD-10-CM

## 2024-08-28 DIAGNOSIS — M79.642 PAIN IN BOTH HANDS: ICD-10-CM

## 2024-08-28 PROCEDURE — 73130 X-RAY EXAM OF HAND: CPT | Performed by: INTERNAL MEDICINE

## 2024-09-06 ENCOUNTER — PATIENT OUTREACH (OUTPATIENT)
Dept: CASE MANAGEMENT | Age: 69
End: 2024-09-06

## 2024-09-06 NOTE — PROGRESS NOTES
Called patient for Chronic Care Management      Left message to call back   Call #1    [facility]  Care Management Department  [phone] work

## 2024-09-11 ENCOUNTER — OFFICE VISIT (OUTPATIENT)
Dept: INTERNAL MEDICINE CLINIC | Facility: CLINIC | Age: 69
End: 2024-09-11
Payer: MEDICARE

## 2024-09-11 ENCOUNTER — PATIENT MESSAGE (OUTPATIENT)
Dept: INTERNAL MEDICINE CLINIC | Facility: CLINIC | Age: 69
End: 2024-09-11

## 2024-09-11 VITALS
HEART RATE: 92 BPM | WEIGHT: 110.19 LBS | SYSTOLIC BLOOD PRESSURE: 130 MMHG | HEIGHT: 57 IN | OXYGEN SATURATION: 98 % | TEMPERATURE: 98 F | BODY MASS INDEX: 23.77 KG/M2 | DIASTOLIC BLOOD PRESSURE: 70 MMHG

## 2024-09-11 DIAGNOSIS — N95.2 VAGINAL ATROPHY: ICD-10-CM

## 2024-09-11 DIAGNOSIS — E78.2 MIXED HYPERLIPIDEMIA: ICD-10-CM

## 2024-09-11 DIAGNOSIS — I10 PRIMARY HYPERTENSION: ICD-10-CM

## 2024-09-11 DIAGNOSIS — M35.9 UNDIFFERENTIATED CONNECTIVE TISSUE DISEASE (HCC): ICD-10-CM

## 2024-09-11 DIAGNOSIS — M81.0 AGE-RELATED OSTEOPOROSIS WITHOUT CURRENT PATHOLOGICAL FRACTURE: Primary | ICD-10-CM

## 2024-09-11 PROBLEM — Z86.010 PERSONAL HISTORY OF COLONIC POLYPS: Status: RESOLVED | Noted: 2022-05-12 | Resolved: 2024-09-11

## 2024-09-11 PROBLEM — R00.2 HEART PALPITATIONS: Status: RESOLVED | Noted: 2024-03-22 | Resolved: 2024-09-11

## 2024-09-11 PROBLEM — R73.01 IMPAIRED FASTING GLUCOSE: Status: RESOLVED | Noted: 2022-03-25 | Resolved: 2024-09-11

## 2024-09-11 PROBLEM — Z86.0100 PERSONAL HISTORY OF COLONIC POLYPS: Status: RESOLVED | Noted: 2022-05-12 | Resolved: 2024-09-11

## 2024-09-11 PROCEDURE — G2211 COMPLEX E/M VISIT ADD ON: HCPCS | Performed by: INTERNAL MEDICINE

## 2024-09-11 PROCEDURE — 99214 OFFICE O/P EST MOD 30 MIN: CPT | Performed by: INTERNAL MEDICINE

## 2024-09-11 RX ORDER — ATORVASTATIN CALCIUM 20 MG/1
20 TABLET, FILM COATED ORAL DAILY
Qty: 90 TABLET | Refills: 1 | Status: SHIPPED | OUTPATIENT
Start: 2024-09-11

## 2024-09-11 NOTE — PATIENT INSTRUCTIONS
Please go ahead and increase the amitryptiline to 30 mg nightly.    Please make sure to resume over the counter vitamin D3 2000 units every day and add calcium 600 mg twice daily.     Please increase your atorvastatin to 20 mg every day.

## 2024-09-11 NOTE — PROGRESS NOTES
Shannon Huntley is a 69 year old female.     HPI:   Patient presents for the folowing issues.  Her quality of life is a bit better compared to 6 months ago.   Osteoporosis - received reclast #1 on 4/2024. No issues.   UCTDz - inflammatory labs are good.   Chronic pain affecting sleep - rheumatology started her on amitryptiline and it initially helped her get 6 hours of sleep but now only getting 4 hours sleep. She has not increased to 30 mg yet.   Dry mouth - pilocarpine caused hot flashes. She took it for 7 days and does not want ot continue it.   HTN  - not checking home pressures   HLP - LDL has only improved a bit on atorvastatin.   Chronic left shoulder pain - cortisone injection helped. Has not used ibuprofen or tylenol with codeine since her injection. She tries to avoid medication.       REVIEW OF SYSTEMS:   GENERAL HEALTH: feels well otherwise. No f/c  NEURO: denies any headaches, LH, dizzyness, LOC. She thinks she tripped on a pebble. She was able to get herself up. She did present to ED with foot pain. Her pain is improved.   VISION: denies any blurred or double vision  RESPIRATORY: denies shortness of breath, cough, or congestion  CARDIOVASCULAR: denies chest pain, pressure or palpitations  GI: denies abdominal pain, constipation, diarrhea,   : no dysuria or hematuria   PSYCH: mood is stable  EXT: denies edema      Wt Readings from Last 6 Encounters:   08/27/24 110 lb 12.8 oz (50.3 kg)   08/07/24 109 lb (49.4 kg)   04/30/24 105 lb 9.6 oz (47.9 kg)   04/24/24 105 lb (47.6 kg)   03/22/24 105 lb (47.6 kg)   01/25/24 109 lb (49.4 kg)       Allergies   Allergen Reactions    Grass HIVES    Fentanyl NAUSEA AND VOMITING    Morphine NAUSEA AND VOMITING       Family History   Problem Relation Age of Onset    Other (Unknown) Father     Hypertension Mother     Heart Disease Mother     Other (atherosclerosis, PTCA, pacemaker) Mother     Other (lupus) Mother     Heart Disease Maternal Grandmother     Ovarian Cancer  Sister 60    Other (Anemia, MI, alcoholic cirrhosis) Other         6 siblings    Cancer Neg     Stroke Neg       Past Medical History:    Abdominal pain, right upper quadrant    Anxiety state, unspecified    Arthritis    Back pain    Biceps muscle tear    Carpal tunnel syndrome    Depression    Dyspareunia    H/O mammogram    Negative    Osteoarthrosis, unspecified whether generalized or localized, unspecified site    Osteopenia    Osteopenia, unspecified location    Osteoporosis    Pain in joints    PONV (postoperative nausea and vomiting)    Rotator cuff syndrome    Spinal cord tumor    c2 intraspinal tumor with cord compression, neurilemmoma     Undifferentiated connective tissue disease (HCC)    Visual impairment    glasses and contacts    Wears glasses      Past Surgical History:   Procedure Laterality Date    Anesth,shoulder replacement Right 2019    Right reverse total shoulder arthroplasty- Dr. Vaca    Back surgery       cer tumors removed    Colonoscopy      Colonoscopy      Hysterectomy      RUKHSANA and BSO, fibroids,     Hysterectomy      Other surgical history      Other surgical history  2008    R toe artificial cartilage, straighten out 4th toe    Other surgical history  2016    Cervical spine fusion    Repair rotator cuff,chronic  ,    (bilateral)    Revise median n/carpal tunnel surg      Rotator cuff repair      Surgery - external  2007    C-spine surgery for Intraspinal tumor      Social History:    Social History     Socioeconomic History    Marital status:     Number of children: 1   Occupational History    Occupation: Homemaker     Comment: Off work since  due to disability   Tobacco Use    Smoking status: Former     Current packs/day: 0.00     Average packs/day: 1 pack/day for 32.0 years (32.0 ttl pk-yrs)     Types: Cigarettes     Start date: 3/20/1971     Quit date: 3/20/2003     Years since quittin.4    Smokeless tobacco: Never    Vaping Use    Vaping status: Never Used   Substance and Sexual Activity    Alcohol use: No     Alcohol/week: 0.0 standard drinks of alcohol    Drug use: Not Currently     Comment: in her 20s she used many drugs but never IVDU    Sexual activity: Yes     Partners: Male     Birth control/protection: Hysterectomy   Other Topics Concern    Caffeine Concern Yes     Comment: 1 cup of coffee daily    Stress Concern No    Weight Concern No    Special Diet No    Exercise No    Seat Belt Yes     Social Determinants of Health      [facility]    Social Connections    [facility]    Housing Stability             EXAM:   /70 (BP Location: Right arm, Patient Position: Sitting, Cuff Size: adult)   Pulse 92   Temp 97.8 °F (36.6 °C) (Temporal)   Ht 4' 9\" (1.448 m)   Wt 110 lb 3.2 oz (50 kg)   SpO2 98%   BMI 23.85 kg/m²   GENERAL: A&O well developed, well nourished,in no apparent distress  SKIN: no rashes,no suspicious lesions  HEENT: atraumatic, MMM, throat is clear  NECK: supple, no jvd, no thyromegaly  LUNGS: clear to auscultation bilateraly, no c/w/r  CARDIO: RRR without g/m/r  GI: soft non tender nondistended no hsm bs throughout  NEURO: CN 2-12 grossly intact  PSYCH: pleasant  EXTREMITIES: no cyanosis, clubbing or edema    ASSESSMENT AND PLAN:   # Chronic Pain - due to OA of multiple joints. Amitryptiline is helping with sleep. Needs to increase to 30 mg.   # Insomnia 2/2 Pain: due to above. Amitryptiline is helping. sleep hygiene, ambien, and trazodone, have not helped.  # Recurrent BV and Dyspareunia/vaginal dryness: last BV infection in 6/2023. Cont vaginal estrogen.   # HLP and high CT calcium score - increase atorvastatin to 20 mg.   # Primary HTN - well controlled on amlodipine.   # Cervical stenosis, disc herniation with radiculopathy and history of cervical spine tumor, cervical  kyphosis: chronic,   - s/p L C3-6 ACDF by Dr. Domingo Gallardo in 4/2016 to stabilize spinal cord.    - underwent b/l facet joint injections, tylenol #4 by Dr. Will but this was not helpful.   - gabapentin, tylenol, meloxicam, voltaren,  lidoerm patches, duloxetine, PT, HEP have not helped  #  Diffuse OA, UCTD and SLE - On plaquenil. Does not tolerate prednisone (palpitations).   - Could not tolerate gabapentin, lyrica, lexapro, or cymbalta b/c of side effects.  # Osteoporosis: per DEXA in 1/2024. Started reclast on 4/2024  - alendronate (worsened her GERD).  educated on dietary calcium/vit D3 and weight bearing exericses and fall prevention.  # Vitamin D deficiency: continue daily supplement  # Health Maintenance: medicare supervisit in 3/2024   Colon Cancer Screening: colonoscopy in 5/2022 by Dawson Majano was normal. Repeat in 5 years (2027) for personal history of colon polyps.   Breast Cancer Screening: cont yearly mammogram   Bone Health: see above.   Vaccines:  up to date with pneumonia series. Cont flu, covid, RSV, shingrix and due for TDAP  Healthcare Living Will, Medical POA:  daughter, Salima Ferris  Hep C Screen: neg in 2017     Care Team:  Podiatry- Dr. Lynch  Ortho spine - Devang Pena ortho - Carlos Eduardo  [facility] - Clifton Schneider        The patient indicates understanding of these issues and agrees to the plan.  The patient is asked to return in 6 months for JOMAR Perez MD

## 2024-09-23 ENCOUNTER — LAB ENCOUNTER (OUTPATIENT)
Dept: LAB | Age: 69
End: 2024-09-23
Attending: INTERNAL MEDICINE
Payer: MEDICARE

## 2024-09-23 DIAGNOSIS — M79.642 PAIN IN BOTH HANDS: ICD-10-CM

## 2024-09-23 DIAGNOSIS — M35.00 SJOGREN'S SYNDROME, WITH UNSPECIFIED ORGAN INVOLVEMENT (HCC): ICD-10-CM

## 2024-09-23 DIAGNOSIS — M79.641 PAIN IN BOTH HANDS: ICD-10-CM

## 2024-09-23 DIAGNOSIS — M35.9 UNDIFFERENTIATED CONNECTIVE TISSUE DISEASE (HCC): ICD-10-CM

## 2024-09-23 LAB
ALBUMIN SERPL-MCNC: 4.3 G/DL (ref 3.2–4.8)
ALBUMIN/GLOB SERPL: 1.9 {RATIO} (ref 1–2)
ALP LIVER SERPL-CCNC: 75 U/L
ALT SERPL-CCNC: 33 U/L
ANION GAP SERPL CALC-SCNC: 5 MMOL/L (ref 0–18)
AST SERPL-CCNC: 29 U/L (ref ?–34)
BASOPHILS # BLD AUTO: 0.02 X10(3) UL (ref 0–0.2)
BASOPHILS NFR BLD AUTO: 0.5 %
BILIRUB SERPL-MCNC: 0.5 MG/DL (ref 0.2–1.1)
BILIRUB UR QL STRIP.AUTO: NEGATIVE
BUN BLD-MCNC: 12 MG/DL (ref 9–23)
C3 SERPL-MCNC: 101.1 MG/DL (ref 90–170)
C4 SERPL-MCNC: 24.1 MG/DL (ref 12–36)
CALCIUM BLD-MCNC: 9.3 MG/DL (ref 8.7–10.4)
CHLORIDE SERPL-SCNC: 107 MMOL/L (ref 98–112)
CLARITY UR REFRACT.AUTO: CLEAR
CO2 SERPL-SCNC: 27 MMOL/L (ref 21–32)
COLOR UR AUTO: COLORLESS
CREAT BLD-MCNC: 0.93 MG/DL
CREAT UR-SCNC: 23.7 MG/DL
CRP SERPL-MCNC: <0.4 MG/DL (ref ?–0.5)
EGFRCR SERPLBLD CKD-EPI 2021: 67 ML/MIN/1.73M2 (ref 60–?)
EOSINOPHIL # BLD AUTO: 0.04 X10(3) UL (ref 0–0.7)
EOSINOPHIL NFR BLD AUTO: 0.9 %
ERYTHROCYTE [DISTWIDTH] IN BLOOD BY AUTOMATED COUNT: 13.8 %
ERYTHROCYTE [SEDIMENTATION RATE] IN BLOOD: 21 MM/HR
FASTING STATUS PATIENT QL REPORTED: NO
GLOBULIN PLAS-MCNC: 2.3 G/DL (ref 2–3.5)
GLUCOSE BLD-MCNC: 83 MG/DL (ref 70–99)
GLUCOSE UR STRIP.AUTO-MCNC: NORMAL MG/DL
HCT VFR BLD AUTO: 37.1 %
HGB BLD-MCNC: 12.1 G/DL
IMM GRANULOCYTES # BLD AUTO: 0.01 X10(3) UL (ref 0–1)
IMM GRANULOCYTES NFR BLD: 0.2 %
KETONES UR STRIP.AUTO-MCNC: NEGATIVE MG/DL
LEUKOCYTE ESTERASE UR QL STRIP.AUTO: NEGATIVE
LYMPHOCYTES # BLD AUTO: 1.51 X10(3) UL (ref 1–4)
LYMPHOCYTES NFR BLD AUTO: 35.7 %
MCH RBC QN AUTO: 29.4 PG (ref 26–34)
MCHC RBC AUTO-ENTMCNC: 32.6 G/DL (ref 31–37)
MCV RBC AUTO: 90.3 FL
MONOCYTES # BLD AUTO: 0.35 X10(3) UL (ref 0.1–1)
MONOCYTES NFR BLD AUTO: 8.3 %
NEUTROPHILS # BLD AUTO: 2.3 X10 (3) UL (ref 1.5–7.7)
NEUTROPHILS # BLD AUTO: 2.3 X10(3) UL (ref 1.5–7.7)
NEUTROPHILS NFR BLD AUTO: 54.4 %
NITRITE UR QL STRIP.AUTO: NEGATIVE
OSMOLALITY SERPL CALC.SUM OF ELEC: 287 MOSM/KG (ref 275–295)
PH UR STRIP.AUTO: 6.5 [PH] (ref 5–8)
PLATELET # BLD AUTO: 225 10(3)UL (ref 150–450)
POTASSIUM SERPL-SCNC: 4.8 MMOL/L (ref 3.5–5.1)
PROT SERPL-MCNC: 6.6 G/DL (ref 5.7–8.2)
PROT UR STRIP.AUTO-MCNC: NEGATIVE MG/DL
PROT UR-MCNC: <6 MG/DL (ref ?–14)
RBC # BLD AUTO: 4.11 X10(6)UL
RBC UR QL AUTO: NEGATIVE
SODIUM SERPL-SCNC: 139 MMOL/L (ref 136–145)
SP GR UR STRIP.AUTO: 1.01 (ref 1–1.03)
URATE SERPL-MCNC: 4.3 MG/DL
UROBILINOGEN UR STRIP.AUTO-MCNC: NORMAL MG/DL
WBC # BLD AUTO: 4.2 X10(3) UL (ref 4–11)

## 2024-09-23 PROCEDURE — 81003 URINALYSIS AUTO W/O SCOPE: CPT

## 2024-09-23 PROCEDURE — 86038 ANTINUCLEAR ANTIBODIES: CPT

## 2024-09-23 PROCEDURE — 84165 PROTEIN E-PHORESIS SERUM: CPT

## 2024-09-23 PROCEDURE — 80053 COMPREHEN METABOLIC PANEL: CPT

## 2024-09-23 PROCEDURE — 84550 ASSAY OF BLOOD/URIC ACID: CPT

## 2024-09-23 PROCEDURE — 86225 DNA ANTIBODY NATIVE: CPT

## 2024-09-23 PROCEDURE — 83521 IG LIGHT CHAINS FREE EACH: CPT

## 2024-09-23 PROCEDURE — 85025 COMPLETE CBC W/AUTO DIFF WBC: CPT

## 2024-09-23 PROCEDURE — 85652 RBC SED RATE AUTOMATED: CPT

## 2024-09-23 PROCEDURE — 86334 IMMUNOFIX E-PHORESIS SERUM: CPT

## 2024-09-23 PROCEDURE — 86160 COMPLEMENT ANTIGEN: CPT

## 2024-09-23 PROCEDURE — 86140 C-REACTIVE PROTEIN: CPT

## 2024-09-23 PROCEDURE — 84156 ASSAY OF PROTEIN URINE: CPT

## 2024-09-23 PROCEDURE — 82570 ASSAY OF URINE CREATININE: CPT

## 2024-09-23 PROCEDURE — 36415 COLL VENOUS BLD VENIPUNCTURE: CPT

## 2024-09-23 NOTE — TELEPHONE ENCOUNTER
From: Shannon Huntley  Sent: 9/23/2024 1:43 PM CDT  To: [facility] Clinical Staff  Subject: marko melton

## 2024-09-24 LAB
DSDNA IGG SERPL IA-ACNC: <0.6 IU/ML
ENA AB SER QL IA: <0.09 UG/L
ENA AB SER QL IA: NEGATIVE

## 2024-09-25 LAB
ALBUMIN SERPL ELPH-MCNC: 3.93 G/DL (ref 3.75–5.21)
ALBUMIN/GLOB SERPL: 1.73 {RATIO} (ref 1–2)
ALPHA1 GLOB SERPL ELPH-MCNC: 0.25 G/DL (ref 0.19–0.46)
ALPHA2 GLOB SERPL ELPH-MCNC: 0.63 G/DL (ref 0.48–1.05)
B-GLOBULIN SERPL ELPH-MCNC: 0.69 G/DL (ref 0.68–1.23)
GAMMA GLOB SERPL ELPH-MCNC: 0.7 G/DL (ref 0.62–1.7)
KAPPA LC FREE SER-MCNC: 1.55 MG/DL (ref 0.33–1.94)
KAPPA LC FREE/LAMBDA FREE SER NEPH: 1.16 {RATIO} (ref 0.26–1.65)
LAMBDA LC FREE SERPL-MCNC: 1.34 MG/DL (ref 0.57–2.63)
PROT SERPL-MCNC: 6.2 G/DL (ref 5.7–8.2)

## 2024-09-28 ENCOUNTER — HOSPITAL ENCOUNTER (OUTPATIENT)
Dept: CT IMAGING | Facility: HOSPITAL | Age: 69
Discharge: HOME | End: 2024-09-28
Attending: INTERNAL MEDICINE
Payer: MEDICARE

## 2024-09-28 DIAGNOSIS — M79.642 LEFT HAND PAIN: ICD-10-CM

## 2024-09-28 DIAGNOSIS — M1A.9XX1 TOPHUS OF LEFT HAND DUE TO GOUT: ICD-10-CM

## 2024-09-28 PROCEDURE — 73200 CT UPPER EXTREMITY W/O DYE: CPT | Performed by: INTERNAL MEDICINE

## 2024-10-09 ENCOUNTER — VIRTUAL PHONE E/M (OUTPATIENT)
Dept: RHEUMATOLOGY | Facility: CLINIC | Age: 69
End: 2024-10-09
Payer: MEDICARE

## 2024-10-09 VITALS — BODY MASS INDEX: 22.65 KG/M2 | WEIGHT: 105 LBS | RESPIRATION RATE: 14 BRPM | HEIGHT: 57 IN

## 2024-10-09 DIAGNOSIS — M1A.00X1 IDIOPATHIC CHRONIC GOUT WITH TOPHUS, UNSPECIFIED SITE: Primary | ICD-10-CM

## 2024-10-09 DIAGNOSIS — Z13.79 AFRICAN ANCESTRY REQUIRING POPULATION-SPECIFIC GENETIC SCREENING: ICD-10-CM

## 2024-10-09 DIAGNOSIS — Z51.81 THERAPEUTIC DRUG MONITORING: ICD-10-CM

## 2024-10-09 PROCEDURE — 99214 OFFICE O/P EST MOD 30 MIN: CPT | Performed by: INTERNAL MEDICINE

## 2024-10-09 PROCEDURE — G2211 COMPLEX E/M VISIT ADD ON: HCPCS | Performed by: INTERNAL MEDICINE

## 2024-10-09 RX ORDER — COLCHICINE 0.6 MG/1
0.6 TABLET ORAL DAILY
Qty: 90 TABLET | Refills: 1 | Status: SHIPPED | OUTPATIENT
Start: 2024-10-09

## 2024-10-09 NOTE — PROGRESS NOTES
Rheumatology f/u Patient Note  =====================================================================================================    Chief complaint: undifferentiated connective tissue disease (UCTD)/SLE   Chief Complaint   Patient presents with    Follow - Up     For Undifferentiated connective tissue disease. LOV 8/27/24  CT for L hand done 9/28/24 and bloodwork completed 9/23/24 - Pt is doing about the same. Pt is still dropping things (I.e. phone or cup). Pt also has a hard time buttoning clothes.  Pt is taking Amitriptyline 10mg, 3x daily and hydroxychloroquine 400mg, daily.    Rapid 3 Score - 5.7     PCP  Kendra Perez MD  Fax: [phone]  Phone: [phone]    =====================================================================================================  HPI Date of visit: 4/24/2024    Shannon Huntley is a 69 year old female     Patient here for further evaluation and management of UCTD.  Previously seeing Dr. Urbina.  Diagnosed with systemic autoimmune disease in 2018 based on a one-time positive EVERT by IFA, positive SSB.  Also with leukopenia.  Ultrasound of the left hand indicated possible synovitis of the first 2 MCPs.  Has been on hydroxychloroquine 200 mg daily since then.  Unclear if this is beneficial.  Seeing ophthalmologist every 6 months.  -Dry mouth: chronic. A few cavities here and there.   -Dry eye: on restasis.   -notes alopecia, diffuse  Chronic polyarthralgia. Affecting Neck/shoulders/hand.  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  History of biceps tendon repair on the right.  -Worked as a  in the past.  -Did not tolerate gabapentin, lyrica, duloxetine  -Takes ibuprofen 800 mg TID prn. Takes sparingly. Takes T3 sparingly.   Hx of OP. Needs to schedule reclast  Fhx: mother with possible lupus  Denies current alopecia, malar rash, photosensitivity rash, discoid lesions, oral/nasal ulcers, pleuritic chest pain, arthritis,  seizures/psychosis, Raynaud's,or blood clots.  Denies miscarriages or obstetric events (early pre-eclampsia, IUGR, placental insufficiency)  -Prior pregnancies and/or children: 1 child  --Pregnancy complications: non  --miscarriage at 3 months in the past.  ==============================================================================================================  Visit: 08/27/24   left her. Gaining weight. Had to start working again.    Left shoulder: bothering her. Last CSI was in 12/2023. Posterior approach. By Dr. Vaca   Amitriptyline: 2 pills nightly. Helping with sleep partially.   On hydroxychloroquine (plaquenil) 400 mg daily.   Dry mouth noted. Trying to find new dentist.   Dry eye: using restasis.   ==============================================================================================================  Today's Visit: 10/09/24    Here to discuss dual-energy CT scan demonstrating gout.     Medications:  Current Outpatient Medications on File Prior to Visit   Medication Sig Dispense Refill    atorvastatin 20 MG Oral Tab Take 1 tablet (20 mg total) by mouth daily. Replaces 10 mg dose 90 tablet 1    conjugated estrogens 0.625 MG/GM Vaginal Cream Apply twice weekly 30 g 5    amitriptyline 10 MG Oral Tab Take 3 tablets (30 mg total) by mouth nightly. 270 tablet 1    hydroxychloroquine (PLAQUENIL) 200 MG Oral Tab Take 2 tablets (400 mg total) by mouth daily. 180 tablet 2    ibuprofen 800 MG Oral Tab Take 1 tablet (800 mg total) by mouth 3 (three) times daily. 90 tablet 3    amLODIPine 2.5 MG Oral Tab Take 1 tablet (2.5 mg total) by mouth every evening. 90 tablet 3    omeprazole 20 MG Oral Capsule Delayed Release Take 1 capsule (20 mg total) by mouth before breakfast. 90 capsule 0    fluticasone propionate 50 MCG/ACT Nasal Suspension 2 sprays by Each Nare route daily. 3 each 3    polyethylene glycol, PEG 3350, 17 g Oral Powd Pack Take 17 g by mouth daily as needed (constipation).       acetaminophen-codeine (TYLENOL WITH CODEINE #3) 300-30 MG Oral Tab Take 1 tablet by mouth 2 (two) times daily as needed for Pain (back pain). 60 tablet 0    Cholecalciferol (VITAMIN D3 OR) Take 2,000 Units by mouth daily.      RESTASIS 0.05 % Ophthalmic Emulsion Place 1 drop into both eyes every 12 (twelve) hours.  6     No current facility-administered medications on file prior to visit.     ?  Allergies:  Allergies   Allergen Reactions    Grass HIVES    Fentanyl NAUSEA AND VOMITING    Morphine NAUSEA AND VOMITING         Objective    Vitals:    10/09/24 1421   Resp: 14   Weight: 105 lb (47.6 kg)   Height: 4' 9\" (1.448 m)     GEN: NAD, well-nourished.   HEENT: Head: NCAT. Face: No lesions. Eyes: Conjunctiva clear.   PULM:  easy effort  Extremities: No cyanosis, edema or deformities.   Neurologic: Strength, CN2-12 grossly intact   Skin: No lesions or rashes.    Labs:    No results found for: \"CK\"    Lab Results   Component Value Date    WBC 4.2 09/23/2024    RBC 4.11 09/23/2024    HGB 12.1 09/23/2024    HCT 37.1 09/23/2024    .0 09/23/2024    MPV 10.5 01/02/2013    MCV 90.3 09/23/2024    MCH 29.4 09/23/2024    MCHC 32.6 09/23/2024    RDW 13.8 09/23/2024    NEPRELIM 2.30 09/23/2024    NEPERCENT 54.4 09/23/2024    LYPERCENT 35.7 09/23/2024    MOPERCENT 8.3 09/23/2024    EOPERCENT 0.9 09/23/2024    BAPERCENT 0.5 09/23/2024    NE 2.30 09/23/2024    LYMABS 1.51 09/23/2024    MOABSO 0.35 09/23/2024    EOABSO 0.04 09/23/2024    BAABSO 0.02 09/23/2024     Lab Results   Component Value Date    GLU 83 09/23/2024    BUN 12 09/23/2024    BUNCREA 19.5 03/22/2024    CREATSERUM 0.93 09/23/2024    ANIONGAP 5 09/23/2024    GFR 80 02/14/2017    GFRNAA 77 03/17/2021    GFRAA 89 03/17/2021    CA 9.3 09/23/2024    OSMOCALC 287 09/23/2024    ALKPHO 75 09/23/2024    AST 29 09/23/2024    ALT 33 09/23/2024    BILT 0.5 09/23/2024    TP 6.6 09/23/2024    TP 6.2 09/23/2024    ALB 4.3 09/23/2024    ALB 3.93 09/23/2024    GLOBULIN 2.3  09/23/2024    AGRATIO 2.2 11/13/2012     09/23/2024    K 4.8 09/23/2024     09/23/2024    CO2 27.0 09/23/2024         Lab Results   Component Value Date    ANAS Negative 04/05/2016     Lab Results   Component Value Date    SSA <100 11/08/2018    ANTISSA <0.2 07/20/2021    SSB <100 11/08/2018    ANTISSB <0.2 07/20/2021     Lab Results   Component Value Date    DSDNA <100 01/19/2018    ANTIDSDNA <1 07/20/2021    ANTISM <0.2 07/20/2021    ANTIRNP <0.2 07/20/2021     Lab Results   Component Value Date    GCQPIIQ01 <0.2 07/20/2021     Lab Results   Component Value Date    C3 101.1 09/23/2024    C4 24.1 09/23/2024     No results found for: \"DRVVT\", \"LAINT\", \"PTTLUPUS\", \"LUPUSINTERP\", \"LA\", \"V8LY3SCNEJ\", \"O0XP2QAHTS\", \"Z0THGOGVHK\", \"T4UQWUYKZH\"  No results found for: \"CARDIOLIPIGG\", \"CARDIOLIPIGM\", \"CARDIOLIPIGA\", \"CARDIOIGA\", \"CARLIP\"      Additional Labs:    Radiology:    Bilateral US hands 2018  IMPRESSION:   1. Synovial hypertrophy at the left first and second MCP joint with grade 1-2 hyperemia as above.   2. No sonographic evidence of active synovitis in the right hand.     Radiology review:  CT HAND LEFT (CPT=73200)    Result Date: 9/30/2024  CONCLUSION:   1. Uric acid crystal deposition with small erosions consistent with gout and secondary osteoarthritis involving predominantly the 2nd and 3rd metacarpophalangeal joints.  2. Significant osteoarthritis throughout the 1st digit and 2nd through 5th DIP joints.  3. Mild osteoarthritic changes of the carpal bones.   LOCATION:  [facility]   Dictated by (CST): Shan Farfan MD on 9/30/2024 at 10:15 AM     Finalized by (CST): Shan Farfan MD on 9/30/2024 at 10:25 AM      =====================================================================================================  Assessment and Plan    Assessment:  1. Idiopathic chronic gout with tophus, unspecified site    2.  ancestry requiring population-specific genetic screening    3. 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  Discussed gout is often caused by diminished renal handling of urate, resultant hyperuricemia, and subsequent deposition of monosodium urate crystals into articular structures.  Management of gouty arthropathy is twofold: 1) treatment of acute gout flares with NSAIDs, colchicine, corticosteroids, and/or anakinra; 2) dissolution of MSU crystals by urate lowering therapy (ULT).     The following in italics were not discussed today:  #undifferentiated connective tissue disease (UCTD): diagnosed in 2018 by prior rheumatologist.  --Relevant Clinical Manifestations: Dry eye/mouth, inflammatory arthritis  --Serologies/Laboratory findings: +EVERT 1:640 homogenous, SSB, leukopenia  -- Progressive glandular symptoms in terms of dry eyes/mouth  #osteoporosis:   -4/30/2024 reclast  #Chronic musculoskeletal pain: DJD noted in the cervical, thoracic, lumbar spine based on prior imaging.  S/p cervical spine fusion, C3-6.  Also with bilateral rotator cuff pathology with prior right biceps tendon repair on the right.  -Did not tolerate gabapentin, lyrica, duloxetine  #Poor, fragmented sleep  # Prior miscarriage at 3 months: APLS labs negative  #Alopecia: Frontal predominant pattern raises concern for traction versus androgenetic alopecia.        Plan:  Get blood work (only after we call you); given -American ancestry, needs to get HLA-B 5801 allele before we can start allopurinol.  This allele increases risk of allopurinol hypersensitivity syndrome quite a bit.    Colchicine (to prevent gout flares while starting on allopurinol). Generally, you will need to be on colchicine for 6-12 months, when the risk of gout flares is highest during allopurinol initiation.  Colchicine 0.6 mg tab: take one pill daily  -if colchicine is too expensive, we can [facility] the medication to [facility].    -continue hydroxychloroquine (plaquenil) to 2 pills daily.  Continue follow-up with ophthalmology.  Try decreasing dose in the future    -pilocarpine uptitration for dry mouth.     -amitriptyline 10 MG Oral Tab; increase to 30 mg at bedtime    Has f/u in 2/2025      Called patient via number listed in chart today    Original appnt date: Today's Visit: 10/09/24      This video telehealth visit (with [employer] ) was conducted in lieu of a previously scheduled clinic visit because of the COVID-19 pandemic. The patient or patient guardian verbally consented to virtual/remote treatment. All medical decision making was made in conjunction with the patient. This telehealth visit was initiated by the patient or patient guardian given the in-person appointment time as above who was located at [home]. The patient presented alone. Risks and benefits of therapy recommended, and potential side effects of all medications prescribed were discussed.  Patient was counseled on symptoms that would necessitate more emergency follow up.     The patient was within the [facility] during our visit.     Patient understands and accepts financial responsibility for any deductible, coinsurance and/or co-pays associated with the service. The patient understands that the physical exam will be limited.    This telehealth visit was performed while I was physically located in a   Medical office at [address] [place] [address], [address], [place], [place]      Diagnoses and all orders for this visit:    Idiopathic chronic gout with tophus, unspecified site  -     colchicine 0.6 MG Oral Tab; Take 1 tablet (0.6 mg total) by mouth daily.  -     Miscellaneous Testing; Future     ancestry requiring population-specific genetic screening  -     colchicine 0.6 MG Oral Tab; Take 1 tablet (0.6 mg total) by mouth daily.  -     Miscellaneous Testing; Future    Therapeutic drug monitoring            No follow-ups on file.      The above plan of  care, diagnosis, orders, and follow-up were discussed with the patient. Questions related to this recommended plan of care were answered.    Thank you for referring this delightful patient to me. Please feel free to contact me with any questions.     This report was performed utilizing speech recognition software technology. Despite proofreading, speech recognition errors could escape detection. If a word or phrase is confusing or out of context, please do not hesitate to call for   clarification.       Kind regards      Helen Feliz MD  [facility]

## 2024-10-09 NOTE — PATIENT INSTRUCTIONS
Get blood work (only after we call you)    Colchicine (to prevent gout flares while starting on allopurinol). Generally, you will need to be on colchicine for 6-12 months, when the risk of gout flares is highest during allopurinol initiation.  Colchicine 0.6 mg tab: take one pill daily  -if colchicine is too expensive, we can send the medication to [facility] pharmacy.

## 2024-10-23 ENCOUNTER — LAB ENCOUNTER (OUTPATIENT)
Dept: LAB | Age: 69
End: 2024-10-23
Attending: INTERNAL MEDICINE
Payer: MEDICARE

## 2024-10-23 DIAGNOSIS — M1A.00X1 IDIOPATHIC CHRONIC GOUT WITH TOPHUS, UNSPECIFIED SITE: ICD-10-CM

## 2024-10-23 DIAGNOSIS — Z13.79 AFRICAN ANCESTRY REQUIRING POPULATION-SPECIFIC GENETIC SCREENING: ICD-10-CM

## 2024-10-24 ENCOUNTER — LAB ENCOUNTER (OUTPATIENT)
Dept: LAB | Age: 69
End: 2024-10-24
Attending: INTERNAL MEDICINE
Payer: MEDICARE

## 2024-10-24 DIAGNOSIS — Z13.79 AFRICAN ANCESTRY REQUIRING POPULATION-SPECIFIC GENETIC SCREENING: ICD-10-CM

## 2024-10-24 DIAGNOSIS — M1A.00X1 IDIOPATHIC CHRONIC GOUT WITH TOPHUS, UNSPECIFIED SITE: ICD-10-CM

## 2024-10-24 PROCEDURE — 81381 HLA I TYPING 1 ALLELE HR: CPT

## 2024-10-24 PROCEDURE — 36415 COLL VENOUS BLD VENIPUNCTURE: CPT

## 2024-10-30 ENCOUNTER — TELEPHONE (OUTPATIENT)
Dept: RHEUMATOLOGY | Facility: CLINIC | Age: 69
End: 2024-10-30

## 2024-10-30 DIAGNOSIS — M1A.00X1 IDIOPATHIC CHRONIC GOUT WITH TOPHUS, UNSPECIFIED SITE: Primary | ICD-10-CM

## 2024-10-30 RX ORDER — FEBUXOSTAT 40 MG/1
40 TABLET, FILM COATED ORAL DAILY
Qty: 90 TABLET | Refills: 1 | Status: SHIPPED | OUTPATIENT
Start: 2024-10-30

## 2024-11-04 ENCOUNTER — TELEPHONE (OUTPATIENT)
Dept: RHEUMATOLOGY | Facility: CLINIC | Age: 69
End: 2024-11-04

## 2024-11-05 NOTE — TELEPHONE ENCOUNTER
[employer] Approved today [facility]  CaseId:[id number];Status:Approved;Review Type:Prior Auth;Coverage Start Date:10/05/2024;Coverage End Date:11/04/2025;  Authorization Expiration Date: 11/3/2025

## 2024-11-07 ENCOUNTER — PATIENT OUTREACH (OUTPATIENT)
Dept: CASE MANAGEMENT | Age: 69
End: 2024-11-07

## 2024-11-07 DIAGNOSIS — M32.9 SYSTEMIC LUPUS ERYTHEMATOSUS, UNSPECIFIED SLE TYPE, UNSPECIFIED ORGAN INVOLVEMENT STATUS (HCC): ICD-10-CM

## 2024-11-07 DIAGNOSIS — M81.0 AGE-RELATED OSTEOPOROSIS WITHOUT CURRENT PATHOLOGICAL FRACTURE: ICD-10-CM

## 2024-11-07 DIAGNOSIS — I10 PRIMARY HYPERTENSION: Primary | ICD-10-CM

## 2024-11-07 DIAGNOSIS — E78.2 MIXED HYPERLIPIDEMIA: ICD-10-CM

## 2024-11-07 NOTE — PROGRESS NOTES
Spoke with patient for CCM     Assessment:    I want to know a little about you.  What do you do for fun/hobbies? Nothing currently but used to love going dancing and skating  Are you retired? Yes  If not, what do you do for work?    Social support:  Do you live with someone? Yes- her nephew  Do you have any pets at home? No pets.  tell me about your pets.   Do you have someone you check in with or talk to on a regular basis? Yes  Are you the primary caretaker for anyone? No  How are you doing with that? N/A   Do you feel you take time for yourself too?     Lets talk about stress.  How much stress do you feel you have in your life? Not much   Is it manageable? yes  What are some of the causes of stress? Nothing in particular is stressing her out   Family  Work  Financial  Etc  Do you have someone you can turn to when you are very stressed and or need help? Yes  How do you handle this stress? Just handles it, doesn't let it get to her   What would help relieve some of this stress? No    Care Team:  Updated specialists and added them to care team:   Dr. Helen Alvarado    Medication review:  Updated medications with meds from other providers as well.  Do you take them every day on time? Yes  Do you feel you can afford your medications? Yes  DM:  if on insulin are they rotating sites? N/A  Ask where they rotate to.      Nutrition: Lets talk about eating habits  Breakfast is very important as it sets the tone for the day  Do you eat three small meals a day? Yes  Do you eat a variety of fruits and veggies? Yes  Do you feel you need to work on your eating habits? Yes  What would you like to change? Unsure just yet   Any special diet you are put on?  (cardiac, Dm diet, etc) Yes - restrictions on sodium intake   Can you tell me why you think you were put on this diet? Hypertension   Do you have any barriers to keeping with this diet? No   Would you like more info? No  What are concerns  or things that keep you from following this diet? Finding the right foods to eat.  Do you want more information or resources to help “shake up” your usual meals? No     Exercise:  Do you exercise? She just joined a gym  What do you do? Weight bearing exercises- new osteoporosis diagnosis   How often? Right now 2 times a week but working on increasing it   How long?   If not, are you up and moving? Yes  Do you want to work on incorporating more movement or exercise into your daily routine? No     Disease specific:   Do you feel you have a good understanding of your diagnoses? Yes  Would you like more info on anything? No    Goals:  What would you say is your biggest concerns about your health? Her osteoporosis and working on her diet.   What steps do you think you could take to work on this? The patient has joined a gym.   What is one baby step you are willing to work on? Make better food choices, watching sodium intake.   What do you think is a barrier or something that may be stopping you from doing this? Her osteoporosis diagnosis, being careful at the gym.  How can I help you achieve your goals? We dicussed at length her osteoporosis, what supplements to take, precautions to take to avoid fractures, and how much weight is safe during her workouts.      Personal:  Are you active on [employer]? Yes  Do you have a preference in day/time to reach out to you monthly? Anytime before 3pm    Next month I will follow up and we will discuss your health, health habits and goals to set for moving forward.     Follow up:  You can follow up with me any time if you have any questions. I will follow up this call with a [employer] message and/or letter with my direct contact information for you to call if you should need anything before we talk next.     Care manager f/up: 1 Month   Work on for next time: work on diet and increasing physical activity   Resources needed: None at this time       Spoke to Shannon at length about CCM,  current care plan and performed CCM assessment with Shannon reviewed meds and compliance.    Interventions for following categories based on assessment:  These interventions aim to enhance patient knowledge, improve their ability to manage their health, and ultimately lead to better health outcomes.     Self care: Encouraged the patient to take time to do the things the love and enjoy.    Preventive Health Education: The patient was encouraged to adhere to health screenings, vaccinations , tests, and scheduled appointments.   Physical Activity: Encouraged the patient to stay active to improve physical and mental well-being.  Nutrition:Ensuring the patient is following a balanced diet that meets nutritional needs.  Staying Hydrated: Advised patient to drink plenty of water in order stay hydrated.        Health Maintenance:   Health Maintenance   Topic Date Due    Zoster Vaccines (1 of 2) Never done    COVID-19 Vaccine (4 - 2023-24 season) 09/01/2024    Influenza Vaccine (1) 10/01/2024    Mammogram  01/04/2025    Colorectal Cancer Screening  05/12/2027    DEXA Scan  Completed    MA Annual Health Assessment  Completed    Annual Depression Screening  Completed    Fall Risk Screening (Annual)  Completed    Pneumococcal Vaccine: 65+ Years  Completed     Meds: Detailed medication review with Shannon. Discussed with Shannon if any potential barriers are present that could prevent compliance with medication regimen. At this time, no barriers reported. Shannon reports is stable on all medications and denies experiencing any side effects.    Your appointments       Date & Time Appointment Department ([place])    Feb 24, 2025 8:45 AM CST Exam - Established with Helen Feliz MD [facility] ([facility])        Mar 12, 2025 2:00 PM CDT Medicare Annual Well Visit with Kendra Perez MD [facility] ([facility]  [facility], [facility]  [address]  [facility] [address] [phone] [facility]  [address]  [facility] [address] [phone]            Time Spent This Encounter Total: 39 min medical record review, telephone communication, care plan updates where needed, and education. Provided acknowledgment and validation to patient's concerns.   Monthly Minute Total including today: 39 min    Physical assessment, complete health history, and need for CCM established by Kendra Perez MD.

## 2024-12-08 DIAGNOSIS — M32.9 SYSTEMIC LUPUS ERYTHEMATOSUS, UNSPECIFIED SLE TYPE, UNSPECIFIED ORGAN INVOLVEMENT STATUS (HCC): ICD-10-CM

## 2024-12-08 DIAGNOSIS — M47.812 CERVICAL SPONDYLOSIS WITHOUT MYELOPATHY: Primary | ICD-10-CM

## 2024-12-08 DIAGNOSIS — M35.9 UNDIFFERENTIATED CONNECTIVE TISSUE DISEASE (HCC): ICD-10-CM

## 2024-12-09 RX ORDER — ACETAMINOPHEN AND CODEINE PHOSPHATE 300; 30 MG/1; MG/1
1 TABLET ORAL 2 TIMES DAILY PRN
Qty: 60 TABLET | Refills: 0 | Status: SHIPPED | OUTPATIENT
Start: 2024-12-09

## 2024-12-09 NOTE — TELEPHONE ENCOUNTER
Name from pharmacy: Acetaminophen/Codeine 300-30 Mg Tab Mall         Will file in chart as: ACETAMINOPHEN-CODEINE 300-30 MG Oral Tab    Sig: Take 1 tablet by mouth 2 (two) times daily as needed for Pain (back pain).    Disp: 60 tablet    Refills: 0    Start: 12/8/2024    Class: Normal    Non-formulary    Last ordered: 11 months ago (12/22/2023) by Kendra Perez MD    Last refill: 12/22/2023    Rx #: [id number]    Controlled Substance Medication Nqarub1512/08/2024 01:52 PM    This medication is a controlled substance - forward to provider to refill      To be filled at: [id number] - [place], [place] - [address] [place] [address] [phone], [phone]     LOV:09/11/2024  RTC:6 months   Labs:n/a   Last filled:12/22/2023  Future Appointments   Date Time Provider Department Center   2/24/2025  8:45 AM Helen Feliz MD [id number]   3/12/2025  2:00 PM Kendra Perez MD [facility]

## 2024-12-31 ENCOUNTER — PATIENT OUTREACH (OUTPATIENT)
Dept: CASE MANAGEMENT | Age: 69
End: 2024-12-31

## 2024-12-31 DIAGNOSIS — I10 PRIMARY HYPERTENSION: Primary | ICD-10-CM

## 2024-12-31 DIAGNOSIS — E78.2 MIXED HYPERLIPIDEMIA: ICD-10-CM

## 2024-12-31 PROCEDURE — 99490 CHRNC CARE MGMT STAFF 1ST 20: CPT

## 2024-12-31 NOTE — PROGRESS NOTES
Spoke to Shannon for [facility] -  monthly call     Updates to patient care team/ comments: Up-to-date    Patient reported changes in medications: None.  Med Adherence  Comment: Taking as directed    Health Maintenance:  Reviewed with patient.   Health Maintenance   Topic Date Due    Zoster Vaccines (1 of 2) Never done    COVID-19 Vaccine (4 - 2024-25 season) 09/01/2024    Influenza Vaccine (1) 10/01/2024    Mammogram  01/04/2025    Colorectal Cancer Screening  05/12/2027    DEXA Scan  Completed    MA Annual Health Assessment  Completed    Annual Depression Screening  Completed    Fall Risk Screening (Annual)  Completed    Pneumococcal Vaccine: 65+ Years  Completed     Patient current concerns:   [facility] spoke with the patient this afternoon. She reports having some sinus issues, but nothing to worry about.   The patient continues to stay active by going to the gym and walking everyday.    The patient expressed concerns about varying blood pressure readings between her arms when checking at home. We reviewed proper techniques for measuring blood pressure. I advised her to bring her blood pressure machine to her next appointment so the nurse can compare its readings with a manual reading taken in the office.     We went over her most recent lab results ordered by Dr. Feliz. The patient had questions about her gout medications and outstanding lab orders.   The patient has an appointment with Dr. Feliz on 2/24/25 and she will have her labs drawn prior to her visit. She states that her hands still feel swollen and painful and will discuss with Dr. Feliz.     She will call Dr. Schneider to schedule her 6 month follow up as she believes she may have missed an appointment.     Goals/Action Plan:    Active goal from previous outreach: The patient wants to stay healthy and active.    Patient reported progress towards goals: The patient continues to work towards goals.  What: The patient expressed concerns about varying blood pressure  readings between her arms when checking at home. We reviewed proper techniques for measuring blood pressure. I advised her to bring her blood pressure machine to her next appointment so the nurse can compare its readings with a manual reading taken in the office.   Where/When/How:We went over her most recent lab results ordered by Dr. Feliz. The patient had questions about her gout medications and outstanding lab orders. The patient has an appointment with Dr. Feliz on 2/24/25 and she will have her labs drawn prior to her visit. She states that her hands still feel swollen and painful and will discuss with Dr. Feliz.      Patient Reported Barriers and Concerns: She reports no barriers.   Plan for overcoming barriers: She will call Dr. Schneider to schedule her 6 month follow up as she believes she may have missed an appointment.       Care managers interventions:   These interventions aim to enhance patient knowledge, improve their ability to manage their health, and ultimately lead to better health outcomes.     Self care: Encouraged the patient to take time to do the things the love and enjoy.    Preventive Health Education: The patient was encouraged to adhere to health screenings, vaccinations, tests, and scheduled appointments.   Physical Activity: Encouraged the patient to stay active to improve physical and mental well-being.  Medication Adherence: Reviewed the patients current medication regimen to ensure proper adherence.   Blood Pressure: Encouraged the patient to continue regular blood pressure monitoring at home and to record readings daily.     Appointments reviewed with patient.   Future Appointments   Date Time Provider Department Center   2/24/2025  8:45 AM Helen Feliz MD [id number] [place]   3/12/2025  2:00 PM Kendra Perez MD [facility] [place]        Next Care Manager Follow Up Date: 1 Month     Reason For Follow Up: review progress and or barriers towards patient's goals.     Time Spent  This Encounter Total: 33 minutes medical record review, telephone communication, care plan updates where needed, education, goals, and action plan recreation/update. Provided acknowledgment and validation to patient's concerns.   Monthly Minute Total including today: 33 minutes  Physical assessment, complete health history, and need for CCM established by Kendra Perez MD.

## 2025-01-06 ENCOUNTER — HOSPITAL ENCOUNTER (OUTPATIENT)
Age: 70
Discharge: HOME | End: 2025-01-06
Attending: EMERGENCY MEDICINE
Payer: MEDICARE

## 2025-01-06 VITALS
RESPIRATION RATE: 19 BRPM | HEART RATE: 104 BPM | OXYGEN SATURATION: 98 % | TEMPERATURE: 99 F | SYSTOLIC BLOOD PRESSURE: 141 MMHG | DIASTOLIC BLOOD PRESSURE: 69 MMHG

## 2025-01-06 DIAGNOSIS — J01.00 ACUTE MAXILLARY SINUSITIS, RECURRENCE NOT SPECIFIED: Primary | ICD-10-CM

## 2025-01-06 LAB
POCT INFLUENZA A: NEGATIVE
POCT INFLUENZA B: NEGATIVE
SARS-COV-2 RNA RESP QL NAA+PROBE: NOT DETECTED

## 2025-01-06 PROCEDURE — 99213 OFFICE O/P EST LOW 20 MIN: CPT

## 2025-01-06 PROCEDURE — 87502 INFLUENZA DNA AMP PROBE: CPT | Performed by: EMERGENCY MEDICINE

## 2025-01-06 PROCEDURE — 99214 OFFICE O/P EST MOD 30 MIN: CPT

## 2025-01-06 PROCEDURE — 99212 OFFICE O/P EST SF 10 MIN: CPT

## 2025-01-06 NOTE — ED INITIAL ASSESSMENT (HPI)
Presents for sinus pressure, congestion, post nasal drip and sore throat for 3 days. Denies fevers at home.

## 2025-01-06 NOTE — ED PROVIDER NOTES
Patient Seen in: [facility]      History     Chief Complaint   Patient presents with    Sinusitis    Cough     Stated Complaint: Sinus pain, sore throat, congestion    Subjective:   HPI    69-year-old female presents to the immediate care for complaints of 3 days of maxillary sinus pain pressure congestion.  Also complains of sore throat.  Denies any fevers or chills.  Denies productive cough or sputum.  Patient denies any recent ill contact.    Objective:     Past Medical History:    Abdominal pain, right upper quadrant    Anxiety state, unspecified    Arthritis    Back pain    Biceps muscle tear    Carpal tunnel syndrome    Depression    Dyspareunia    H/O mammogram    Negative    Osteoarthrosis, unspecified whether generalized or localized, unspecified site    Osteopenia    Osteopenia, unspecified location    Osteoporosis    Pain in joints    PONV (postoperative nausea and vomiting)    Rotator cuff syndrome    Spinal cord tumor    c2 intraspinal tumor with cord compression, neurilemmoma     Undifferentiated connective tissue disease (HCC)    Visual impairment    glasses and contacts    Wears glasses              Past Surgical History:   Procedure Laterality Date    Anesth,shoulder replacement Right 05/30/2019    Right reverse total shoulder arthroplasty- Dr. Vaca    Back surgery  2007     cer tumors removed    Colonoscopy  2010    Colonoscopy      Hysterectomy  1985    RUKHSANA and BSO, fibroids, 1987    Hysterectomy      Other surgical history  2006    Other surgical history  1/2008    R toe artificial cartilage, straighten out 4th toe    Other surgical history  04/2016    Cervical spine fusion    Repair rotator cuff,chronic  2001,2003    (bilateral)    Revise median n/carpal tunnel surg  2000    Rotator cuff repair      Surgery - external  September 2007    C-spine surgery for Intraspinal tumor                Social History     Socioeconomic History    Marital status:     Number of  children: 1   Occupational History    Occupation: Homemaker     Comment: Off work since  due to disability   Tobacco Use    Smoking status: Former     Current packs/day: 0.00     Average packs/day: 1 pack/day for 32.0 years (32.0 ttl pk-yrs)     Types: Cigarettes     Start date: 3/20/1971     Quit date: 3/20/2003     Years since quittin.8    Smokeless tobacco: Never   Vaping Use    Vaping status: Never Used   Substance and Sexual Activity    Alcohol use: No     Alcohol/week: 0.0 standard drinks of alcohol    Drug use: Not Currently     Comment: in her 20s she used many drugs but never IVDU    Sexual activity: Yes     Partners: Male     Birth control/protection: Hysterectomy   Other Topics Concern    Caffeine Concern Yes     Comment: 1 cup of coffee daily    Stress Concern No    Weight Concern No    Special Diet No    Exercise No    Seat Belt Yes     Social Drivers of Health     Financial Resource Strain: Low Risk  (2024)    Financial Resource Strain     Difficulty of Paying Living Expenses: Not hard at all     Med Affordability: No   Food Insecurity: No Food Insecurity (2024)    Food Insecurity     Food Insecurity: Never true   Transportation Needs: No Transportation Needs (2024)    Transportation Needs     Lack of Transportation: No   Stress: No Stress Concern Present (2024)    Stress     Feeling of Stress : No   Social Connections: Socially Integrated (2024)    Social Connections     Frequency of Socialization with Friends and Family: 3   Housing Stability: Low Risk  (2024)    Housing Stability     Housing Instability: No              Review of Systems    Positive for stated complaint: Sinus pain, sore throat, congestion  Other systems are as noted in HPI.  Constitutional and vital signs reviewed.      All other systems reviewed and negative except as noted above.    Physical Exam     ED Triage Vitals [25 0836]   /69   Pulse 104   Resp 19   Temp 99 °F (37.2 °C)    Temp src Oral   SpO2 98 %   O2 Device None (Room air)       Current Vitals:   Vital Signs  BP: 141/69  Pulse: 104  Resp: 19  Temp: 99 °F (37.2 °C)  Temp src: Oral    Oxygen Therapy  SpO2: 98 %  O2 Device: None (Room air)        Physical Exam  General: Alert and oriented. No acute distress.  HEENT: Normocephalic. No evidence of trauma. Extraocular movements are intact.  Cardiovascular exam: Regular rate and rhythm  Lungs: Clear to auscultation bilaterally.  Abdomen: Soft, nondistended, nontender.  Extremities: No evidence of deformity. No clubbing or cyanosis.  Neuro: No focal deficit is noted.    ED Course     Labs Reviewed   RAPID SARS-COV-2 BY PCR - Normal   POCT FLU TEST - Normal    Narrative:     This assay is a rapid molecular in vitro test utilizing nucleic acid amplification of influenza A and B viral RNA.       Clinical symptoms appear consistent with acute sinusitis.  Patient tested for COVID and flu both of which were negative.  Patient will be discharged home with Augmentin.     Patient was screened and evaluated during this visit.   As a treating physician attending to the patient, I determined, within reasonable clinical confidence and prior to discharge, that an emergency medical condition was not or was no longer present.  There was no indication for further evaluation, treatment or admission on an emergency basis.  Comprehensive verbal and written discharge and follow-up instructions were provided to help prevent relapse or worsening.  Patient was instructed to follow-up with her primary care provider for further evaluation and treatment, but to return immediately to the ER for worsening, concerning, new, changing or persisting symptoms.  I discussed the case with the patient and they had no questions, complaints, or concerns.  Patient felt comfortable going home.    ^^Please note that this report has been produced using speech recognition software and may contain errors related to that system  including, but not limited to, errors in grammar, punctuation, and spelling, as well as words and phrases that possibly may have been recognized inappropriately.  If there are any questions or concerns, contact the dictating provider for clarification        MDM    Disposition and Plan     Clinical Impression:  1. Acute maxillary sinusitis, recurrence not specified         Disposition:  Discharge  1/6/2025  9:00 am    Follow-up:  Kendra Perez MD  [address] [place] [id number] [phone]    Call   As needed, If symptoms worsen          Medications Prescribed:  Current Discharge Medication List        START taking these medications    Details   amoxicillin clavulanate 875-125 MG Oral Tab Take 1 tablet by mouth 2 (two) times daily for 10 days.  Qty: 20 tablet, Refills: 0                 Supplementary Documentation:

## 2025-01-06 NOTE — DISCHARGE INSTRUCTIONS
Follow up with your primary care doctor  Take augmentin twice a day   Take an over the counter decongestant as needed  Return if any worsening symptoms or new concern

## 2025-02-21 ENCOUNTER — LAB ENCOUNTER (OUTPATIENT)
Dept: LAB | Age: 70
End: 2025-02-21
Attending: INTERNAL MEDICINE
Payer: MEDICARE

## 2025-02-21 DIAGNOSIS — M1A.00X1 IDIOPATHIC CHRONIC GOUT WITH TOPHUS, UNSPECIFIED SITE: ICD-10-CM

## 2025-02-21 LAB
ALBUMIN SERPL-MCNC: 4.2 G/DL (ref 3.2–4.8)
ALBUMIN/GLOB SERPL: 2.1 {RATIO} (ref 1–2)
ALP LIVER SERPL-CCNC: 73 U/L
ALT SERPL-CCNC: 43 U/L
ANION GAP SERPL CALC-SCNC: 7 MMOL/L (ref 0–18)
AST SERPL-CCNC: 41 U/L (ref ?–34)
BILIRUB SERPL-MCNC: 0.3 MG/DL (ref 0.2–1.1)
BUN BLD-MCNC: 14 MG/DL (ref 9–23)
CALCIUM BLD-MCNC: 9.6 MG/DL (ref 8.7–10.6)
CHLORIDE SERPL-SCNC: 105 MMOL/L (ref 98–112)
CO2 SERPL-SCNC: 30 MMOL/L (ref 21–32)
CREAT BLD-MCNC: 1.02 MG/DL
EGFRCR SERPLBLD CKD-EPI 2021: 60 ML/MIN/1.73M2 (ref 60–?)
FASTING STATUS PATIENT QL REPORTED: NO
GLOBULIN PLAS-MCNC: 2 G/DL (ref 2–3.5)
GLUCOSE BLD-MCNC: 96 MG/DL (ref 70–99)
OSMOLALITY SERPL CALC.SUM OF ELEC: 294 MOSM/KG (ref 275–295)
POTASSIUM SERPL-SCNC: 4.3 MMOL/L (ref 3.5–5.1)
PROT SERPL-MCNC: 6.2 G/DL (ref 5.7–8.2)
SODIUM SERPL-SCNC: 142 MMOL/L (ref 136–145)
URATE SERPL-MCNC: 1.9 MG/DL

## 2025-02-21 PROCEDURE — 80053 COMPREHEN METABOLIC PANEL: CPT

## 2025-02-21 PROCEDURE — 84550 ASSAY OF BLOOD/URIC ACID: CPT

## 2025-02-21 PROCEDURE — 36415 COLL VENOUS BLD VENIPUNCTURE: CPT

## 2025-02-24 ENCOUNTER — TELEPHONE (OUTPATIENT)
Facility: CLINIC | Age: 70
End: 2025-02-24

## 2025-02-24 ENCOUNTER — OFFICE VISIT (OUTPATIENT)
Dept: RHEUMATOLOGY | Facility: CLINIC | Age: 70
End: 2025-02-24
Payer: MEDICARE

## 2025-02-24 VITALS
TEMPERATURE: 97 F | OXYGEN SATURATION: 98 % | BODY MASS INDEX: 23.3 KG/M2 | HEIGHT: 57 IN | WEIGHT: 108 LBS | SYSTOLIC BLOOD PRESSURE: 150 MMHG | DIASTOLIC BLOOD PRESSURE: 66 MMHG | RESPIRATION RATE: 16 BRPM | HEART RATE: 123 BPM

## 2025-02-24 DIAGNOSIS — G89.4 CHRONIC PAIN SYNDROME: ICD-10-CM

## 2025-02-24 DIAGNOSIS — M25.512 CHRONIC PAIN OF BOTH SHOULDERS: ICD-10-CM

## 2025-02-24 DIAGNOSIS — M25.511 CHRONIC PAIN OF BOTH SHOULDERS: ICD-10-CM

## 2025-02-24 DIAGNOSIS — R79.89 LOW VITAMIN D LEVEL: ICD-10-CM

## 2025-02-24 DIAGNOSIS — M47.816 OSTEOARTHRITIS OF LUMBAR SPINE, UNSPECIFIED SPINAL OSTEOARTHRITIS COMPLICATION STATUS: ICD-10-CM

## 2025-02-24 DIAGNOSIS — M35.9 UNDIFFERENTIATED CONNECTIVE TISSUE DISEASE (HCC): ICD-10-CM

## 2025-02-24 DIAGNOSIS — G89.29 CHRONIC PAIN OF BOTH SHOULDERS: ICD-10-CM

## 2025-02-24 DIAGNOSIS — M06.4 INFLAMMATORY POLYARTHRITIS (HCC): ICD-10-CM

## 2025-02-24 DIAGNOSIS — L65.9 ALOPECIA: ICD-10-CM

## 2025-02-24 DIAGNOSIS — M54.2 CERVICALGIA: ICD-10-CM

## 2025-02-24 DIAGNOSIS — M35.00 SJOGREN'S SYNDROME, WITH UNSPECIFIED ORGAN INVOLVEMENT (HCC): ICD-10-CM

## 2025-02-24 DIAGNOSIS — M1A.00X1 IDIOPATHIC CHRONIC GOUT WITH TOPHUS, UNSPECIFIED SITE: Primary | ICD-10-CM

## 2025-02-24 PROCEDURE — G2211 COMPLEX E/M VISIT ADD ON: HCPCS | Performed by: INTERNAL MEDICINE

## 2025-02-24 PROCEDURE — 99214 OFFICE O/P EST MOD 30 MIN: CPT | Performed by: INTERNAL MEDICINE

## 2025-02-24 RX ORDER — MELOXICAM 15 MG/1
15 TABLET ORAL
Qty: 90 TABLET | Refills: 0 | Status: SHIPPED | OUTPATIENT
Start: 2025-02-24

## 2025-02-24 RX ORDER — HYDROXYCHLOROQUINE SULFATE 200 MG/1
200 TABLET, FILM COATED ORAL DAILY
Qty: 90 TABLET | Refills: 2 | Status: SHIPPED | OUTPATIENT
Start: 2025-02-24

## 2025-02-24 RX ORDER — CYCLOBENZAPRINE HCL 5 MG
5 TABLET ORAL NIGHTLY
Qty: 30 TABLET | Refills: 0 | Status: SHIPPED | OUTPATIENT
Start: 2025-02-24 | End: 2025-03-26

## 2025-02-24 RX ORDER — CEVIMELINE HYDROCHLORIDE 30 MG/1
CAPSULE ORAL
Qty: 249 CAPSULE | Refills: 1 | Status: SHIPPED | OUTPATIENT
Start: 2025-02-24 | End: 2025-05-25

## 2025-02-24 NOTE — PROGRESS NOTES
Rheumatology f/u Patient Note  =====================================================================================================    Chief complaint: undifferentiated connective tissue disease (UCTD)/SLE   Chief Complaint   Patient presents with    Follow - Up     LOV: 8/27/24  Patient is here for a follow up visit. Patient states that her hands hurt all the time. Pain in lower back (9/10) and hands (10/10)  Rapid 3: 5.7     PCP  Kendra Perez MD  Fax: [phone]  Phone: [phone]  =====================================================================================================  HPI Date of visit: 4/24/2024    Shannon Huntley is a 69 year old female   Patient here for further evaluation and management of UCTD.  Previously seeing Dr. Urbina.  Diagnosed with systemic autoimmune disease in 2018 based on a one-time positive EVERT by IFA, positive SSB.  Also with leukopenia.  Ultrasound of the left hand indicated possible synovitis of the first 2 MCPs.  Has been on hydroxychloroquine 200 mg daily since then.  Unclear if this is beneficial.  Seeing ophthalmologist every 6 months.  -Dry mouth: chronic. A few cavities here and there.   -Dry eye: on restasis.   -notes alopecia, diffuse  Chronic polyarthralgia. Affecting Neck/shoulders/hand.  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  History of biceps tendon repair on the right.  -Worked as a  in the past.  -Did not tolerate gabapentin, lyrica, duloxetine  -Takes ibuprofen 800 mg TID prn. Takes sparingly. Takes T3 sparingly.   Hx of OP. Needs to schedule reclast  Fhx: mother with possible lupus  Denies current alopecia, malar rash, photosensitivity rash, discoid lesions, oral/nasal ulcers, pleuritic chest pain, arthritis, seizures/psychosis, Raynaud's,or blood clots.  Denies miscarriages or obstetric events (early pre-eclampsia, IUGR, placental insufficiency)  -Prior pregnancies and/or children: 1  child  --Pregnancy complications: non  ==============================================================================================================  Visit: 10/09/24  Here to discuss dual-energy CT scan demonstrating gout.   ==============================================================================================================  Today's Visit: 02/24/25    Patient with significant pain, affecting blood pressure and heart rate.  History of Present Illness  Shannon Huntley is a 69 year old female with chronic back pain and gout who presents with worsening back and hand pain.    She has been experiencing severe back pain for over a week, describing it as 'on fire'. The pain radiates to her buttocks and legs, significantly affecting her sleep. She has been taking ibuprofen 800 mg twice daily, which provides some relief. Her medical history includes scoliosis and osteoporosis, for which she has received Reclast treatment. She reports muscle tightness and spasms in her neck and back, which have not improved. She experiences difficulty sleeping despite medication and reports a high heart rate, which she attributes to pain.    She reports significant pain in her hands, describing it as 'short touch pain' and 'burning'. Her fingers, particularly one, feel 'on fire' and are swollen daily. She experiences numbness in certain positions and has a history of carpal tunnel syndrome in one hand. She is on daily gout medication, including febuxostat 40 mg and colchicine, but notes persistent swelling and pain.    She has a history of shoulder replacement and mentions ongoing pain in her shoulders and neck. She had a fusion surgery on cervical vertebrae C3, C4, C5, and C6.    She is currently taking hydroxychloroquine but has not yet scheduled an eye doctor appointment. She also mentions taking pilocarpine for dry mouth but has not been using it recently due to hot flashes.    She has been trying to increase her physical  activity by walking regularly and attempting exercises to strengthen her back and stomach. She mentions a period of inactivity following surgery, which she attributes to possible depression.      5 point ROS negative except noted above  I had reviewed past medical and family histories together with allergy and medication lists documented.      Medications:  Current Outpatient Medications on File Prior to Visit   Medication Sig Dispense Refill    ACETAMINOPHEN-CODEINE 300-30 MG Oral Tab Take 1 tablet by mouth 2 (two) times daily as needed for Pain (back pain). 60 tablet 0    CALCIUM OR Take by mouth.      Zoledronic Acid (RECLAST IV) Inject into the vein.      febuxostat (ULORIC) 40 MG Oral Tab Take 1 tablet (40 mg total) by mouth daily. 90 tablet 1    colchicine 0.6 MG Oral Tab Take 1 tablet (0.6 mg total) by mouth daily. 90 tablet 1    atorvastatin 20 MG Oral Tab Take 1 tablet (20 mg total) by mouth daily. Replaces 10 mg dose 90 tablet 1    conjugated estrogens 0.625 MG/GM Vaginal Cream Apply twice weekly 30 g 5    amLODIPine 2.5 MG Oral Tab Take 1 tablet (2.5 mg total) by mouth every evening. 90 tablet 3    omeprazole 20 MG Oral Capsule Delayed Release Take 1 capsule (20 mg total) by mouth before breakfast. 90 capsule 0    fluticasone propionate 50 MCG/ACT Nasal Suspension 2 sprays by Each Nare route daily. 3 each 3    polyethylene glycol, PEG 3350, 17 g Oral Powd Pack Take 17 g by mouth daily as needed (constipation).      Cholecalciferol (VITAMIN D3 OR) Take 2,000 Units by mouth daily.      RESTASIS 0.05 % Ophthalmic Emulsion Place 1 drop into both eyes every 12 (twelve) hours.  6     No current facility-administered medications on file prior to visit.     ?  Allergies:  Allergies   Allergen Reactions    Allopurinol OTHER (SEE COMMENTS)     +HLA-, cannot take allopurinol.     Grass HIVES    Fentanyl NAUSEA AND VOMITING    Morphine NAUSEA AND VOMITING         Objective    Vitals:    02/24/25 0843 02/24/25  0854   BP: 158/66 150/66   Pulse: (!) 123    Resp: 16    Temp: 96.9 °F (36.1 °C)    SpO2: 98%    Weight: 108 lb (49 kg)    Height: 4' 9\" (1.448 m)      Manual heart rate measurements in the 100s.    GEN: NAD, well-nourished.   HEENT: Head: NCAT. Face: No lesions. Eyes: Conjunctiva clear.   PULM:  easy effort  Extremities: No cyanosis, edema or deformities.   Neurologic: Strength, CN2-12 grossly intact   Skin: No lesions or rashes.  MSK: 28 joint count performed. No evidence of synovitis in mcp, pip, dip, wrist, elbows, shoulders, hips, knees, ankles, mtp unless otherwise noted. Full ROM of elbows, wrists, knees.     Significant osteochondral hypertrophy and synovial hypertrophy in the MCPs/PIPs/DIPs with slight effusions of the MCPs/PIPs/DIPs with tenderness  -Positive Durkan's on the left  -Significant tenderness of the cervical/lumbar paraspinals    Labs:    Lab Results   Component Value Date    URIC 1.9 (L) 02/21/2025    URIC 4.3 09/23/2024         No results found for: \"CK\"    Lab Results   Component Value Date    WBC 4.2 09/23/2024    RBC 4.11 09/23/2024    HGB 12.1 09/23/2024    HCT 37.1 09/23/2024    .0 09/23/2024    MPV 10.5 01/02/2013    MCV 90.3 09/23/2024    MCH 29.4 09/23/2024    MCHC 32.6 09/23/2024    RDW 13.8 09/23/2024    NEPRELIM 2.30 09/23/2024    NEPERCENT 54.4 09/23/2024    LYPERCENT 35.7 09/23/2024    MOPERCENT 8.3 09/23/2024    EOPERCENT 0.9 09/23/2024    BAPERCENT 0.5 09/23/2024    NE 2.30 09/23/2024    LYMABS 1.51 09/23/2024    MOABSO 0.35 09/23/2024    EOABSO 0.04 09/23/2024    BAABSO 0.02 09/23/2024     Lab Results   Component Value Date    GLU 96 02/21/2025    BUN 14 02/21/2025    BUNCREA 19.5 03/22/2024    CREATSERUM 1.02 02/21/2025    ANIONGAP 7 02/21/2025    GFR 80 02/14/2017    GFRNAA 77 03/17/2021    GFRAA 89 03/17/2021    CA 9.6 02/21/2025    OSMOCALC 294 02/21/2025    ALKPHO 73 02/21/2025    AST 41 (H) 02/21/2025    ALT 43 02/21/2025    BILT 0.3 02/21/2025    TP 6.2  02/21/2025    ALB 4.2 02/21/2025    GLOBULIN 2.0 02/21/2025    AGRATIO 2.2 11/13/2012     02/21/2025    K 4.3 02/21/2025     02/21/2025    CO2 30.0 02/21/2025         Lab Results   Component Value Date    ANAS Negative 04/05/2016     Lab Results   Component Value Date    SSA <100 11/08/2018    ANTISSA <0.2 07/20/2021    SSB <100 11/08/2018    ANTISSB <0.2 07/20/2021     Lab Results   Component Value Date    DSDNA <100 01/19/2018    ANTIDSDNA <1 07/20/2021    ANTISM <0.2 07/20/2021    ANTIRNP <0.2 07/20/2021     Lab Results   Component Value Date    JDUZAQO78 <0.2 07/20/2021     Lab Results   Component Value Date    C3 101.1 09/23/2024    C4 24.1 09/23/2024     No results found for: \"DRVVT\", \"LAINT\", \"PTTLUPUS\", \"LUPUSINTERP\", \"LA\", \"K3FT8HKFMN\", \"B3AR0AHHGR\", \"F1TYUZAPHH\", \"P8XABHEVVH\"  No results found for: \"CARDIOLIPIGG\", \"CARDIOLIPIGM\", \"CARDIOLIPIGA\", \"CARDIOIGA\", \"CARLIP\"      Additional Labs:    Radiology:    Bilateral US hands 2018  IMPRESSION:   1. Synovial hypertrophy at the left first and second MCP joint with grade 1-2 hyperemia as above.   2. No sonographic evidence of active synovitis in the right hand.     Radiology review:    4/2021 MRI L-spine  1. Stable postsurgical changes of ACDF C3 through C6. Interfacet devices again noted at C3-C4, C4-C5, and C5-C6. Laminectomies noted at C1 through C3. Surgical levels are patent.   2. Mild degenerative changes in the cervical spine without significant spinal canal or foraminal narrowing.   3. Mild degenerative changes in the lumbar spine without significant spinal canal or foraminal narrowing. Fairly severe facet arthropathy at L4-L5 with associated edema consistent with synovitis. No critical spinal canal or foraminal stenosis.       CT HAND LEFT (CPT=73200)    Result Date: 9/30/2024  CONCLUSION:   1. Uric acid crystal deposition with small erosions consistent with gout and secondary osteoarthritis involving predominantly the 2nd and 3rd  metacarpophalangeal joints.  2. Significant osteoarthritis throughout the 1st digit and 2nd through 5th DIP joints.  3. Mild osteoarthritic changes of the carpal bones.   LOCATION:  [facility]   Dictated by (CST): Shan Farfan MD on 9/30/2024 at 10:15 AM     Finalized by (CST): Shan Farfan MD on 9/30/2024 at 10:25 AM      =====================================================================================================  Assessment and Plan  Assessment:  1. Idiopathic chronic gout with tophus, unspecified site    2. Osteoarthritis of lumbar spine, unspecified spinal osteoarthritis complication status    3. Chronic pain of both shoulders    4. Cervicalgia    5. Undifferentiated connective tissue disease (HCC)    6. Alopecia    7. Inflammatory polyarthritis (HCC)    8. Chronic pain syndrome    9. Low vitamin D level    10. 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  -Still with significant joint effusions and prominent osteochondral hypertrophy that I suspect is more related to gouty arthropathy rather than UCTD arthritis    #HLA-B 5801 positive  -Cannot take allopurinol    #undifferentiated connective tissue disease (UCTD): diagnosed in 2018 by prior rheumatologist.  --Relevant Clinical Manifestations: Dry eye/mouth, inflammatory arthritis  --Serologies/Laboratory findings: +EVERT 1:640 homogenous, SSB, leukopenia  -- Progressive glandular symptoms in terms of dry eyes/mouth  --Could not tolerate pilocarpine given sweating    #osteoporosis:   -4/30/2024 reclast  #Chronic musculoskeletal pain: DJD noted in the cervical, thoracic, lumbar spine based on prior imaging.  S/p cervical spine fusion, C3-6.  Also with bilateral rotator cuff pathology with prior right biceps tendon repair on the right.  -Did not tolerate gabapentin, lyrica,  duloxetine  -Worsening cervical, lumbar spine pain that started after exercise.    #Poor, fragmented sleep    #Alopecia: Frontal predominant pattern raises concern for traction versus androgenetic alopecia.      Plan:    X-rays of the neck and lumbar spine    Carpal tunnel syndrome brace on the left at bedtime.  S/p right CTS surgery in the past    Physical therapy for   lumbar degenerative disc disease (DJD), cervical degenerative disc disease (DJD), bilateral shoulder pain    Stop amitriptyline.   Cyclobenzaprine for muscle spasm: take in the evening.     Slight hypertension, likely due to pain but seeing PCP next month    Take meloxicam 15 mg daily with food, don't take ibuprofen, naproxen, voltaren the same day, they are in the same family.  -stop ibuprofen    Continue febuxostat 40 mg daily.  Repeat bloodwork in 3 months to monitor gout, uric acid     Colchicine (to prevent gout flares while starting on allopurinol). Generally, you will need to be on colchicine for 6-12 months, when the risk of gout flares is highest during allopurinol initiation.  Colchicine 0.6 mg tab: take one pill daily    -decrease hydroxychloroquine (plaquenil) to one pills daily.  Continue follow-up with ophthalmology.     -pilocarpine: sweating   -try cevimeline for dry mouth      Rtc in June 2025      Diagnoses and all orders for this visit:    Idiopathic chronic gout with tophus, unspecified site  -     XR LUMBAR SPINE (MIN 4 VIEWS) (CPT=72110); Future  -     Physical Therapy Referral - [facility] Location  -     cyclobenzaprine 5 MG Oral Tab; Take 1 tablet (5 mg total) by mouth nightly.  -     XR CERVICAL SPINE (4VIEWS) (CPT=72050); Future  -     CK (Creatine Kinase) (Not Creatinine); Future  -     Uric Acid; Future  -     Comp Metabolic Panel (14); Future    Osteoarthritis of lumbar spine, unspecified spinal osteoarthritis complication status  -     XR LUMBAR SPINE (MIN 4 VIEWS) (CPT=72110); Future  -     Physical Therapy Referral -  [facility] Location  -     cyclobenzaprine 5 MG Oral Tab; Take 1 tablet (5 mg total) by mouth nightly.  -     XR CERVICAL SPINE (4VIEWS) (CPT=72050); Future  -     CK (Creatine Kinase) (Not Creatinine); Future  -     Uric Acid; Future  -     Comp Metabolic Panel (14); Future    Chronic pain of both shoulders  -     XR LUMBAR SPINE (MIN 4 VIEWS) (CPT=72110); Future  -     Physical Therapy Referral - [facility] Location  -     cyclobenzaprine 5 MG Oral Tab; Take 1 tablet (5 mg total) by mouth nightly.  -     XR CERVICAL SPINE (4VIEWS) (CPT=72050); Future  -     CK (Creatine Kinase) (Not Creatinine); Future  -     Uric Acid; Future  -     Comp Metabolic Panel (14); Future    Cervicalgia  -     XR LUMBAR SPINE (MIN 4 VIEWS) (CPT=72110); Future  -     Physical Therapy Referral - [facility] Location  -     cyclobenzaprine 5 MG Oral Tab; Take 1 tablet (5 mg total) by mouth nightly.  -     XR CERVICAL SPINE (4VIEWS) (CPT=72050); Future  -     CK (Creatine Kinase) (Not Creatinine); Future  -     Uric Acid; Future  -     Comp Metabolic Panel (14); Future    Undifferentiated connective tissue disease (HCC)  -     hydroxychloroquine (PLAQUENIL) 200 MG Oral Tab; Take 1 tablet (200 mg total) by mouth daily.    Alopecia  -     hydroxychloroquine (PLAQUENIL) 200 MG Oral Tab; Take 1 tablet (200 mg total) by mouth daily.    Inflammatory polyarthritis (HCC)  -     hydroxychloroquine (PLAQUENIL) 200 MG Oral Tab; Take 1 tablet (200 mg total) by mouth daily.    Chronic pain syndrome  -     hydroxychloroquine (PLAQUENIL) 200 MG Oral Tab; Take 1 tablet (200 mg total) by mouth daily.    Low vitamin D level  -     hydroxychloroquine (PLAQUENIL) 200 MG Oral Tab; Take 1 tablet (200 mg total) by mouth daily.    Sjogren's syndrome, with unspecified organ involvement (HCC)  -     Cevimeline HCl 30 MG Oral Cap; Take 1 capsule (30 mg total) by mouth every evening for 7 days, THEN 1 capsule (30 mg total) 2 (two) times a day for 7 days, THEN 1 capsule (30 mg  total) 3 (three) times daily.    Other orders  -     Meloxicam 15 MG Oral Tab; Take 1 tablet (15 mg total) by mouth daily with breakfast.        Return in about 3 months (around 6/10/2025).      The above plan of care, diagnosis, orders, and follow-up were discussed with the patient. Questions related to this recommended plan of care were answered.    Thank you for referring this delightful patient to me. Please feel free to contact me with any questions.     This report was performed utilizing speech recognition software technology. Despite proofreading, speech recognition errors could escape detection. If a word or phrase is confusing or out of context, please do not hesitate to call for   clarification.       Kind regards      Helen Feliz MD  [facility]

## 2025-02-24 NOTE — PROGRESS NOTES
The following individual(s) verbally consented to be recorded using ambient AI listening technology and understand that they can each withdraw their consent to this listening technology at any point by asking the clinician to turn off or pause the recording:    Patient name: Shannon Huntley  Additional names:   Helen Seymour [facility]  2/24/2025

## 2025-02-24 NOTE — TELEPHONE ENCOUNTER
PA for Cevimeline started on [employer] My Meds (Key: [id number]    Approved today by [employer] 2017  CaseId:[id number];Status:Approved;Review Type:Prior Auth;Coverage Start Date:01/25/2025;Coverage End Date:02/24/2026;  Effective Date: 1/24/2025  Authorization Expiration Date: 2/23/2026

## 2025-02-24 NOTE — PATIENT INSTRUCTIONS
X-rays of the neck and lumbar spine    Carpal tunnel syndrome brace on the left at bedtime     Physical therapy:   lumbar degenerative disc disease (DJD), cervical degenerative disc disease (DJD), bilateral shoulder pain    Stop amitriptyline      Take meloxicam 15 mg daily with food, don't take ibuprofen, naproxen, voltaren the same day, they are in the same family.    Continue febuxostat 40 mg daily.  Repeat bloodwork in 3 months to monitor gout    Colchicine (to prevent gout flares while starting on allopurinol). Generally, you will need to be on colchicine for 6-12 months, when the risk of gout flares is highest during allopurinol initiation.  Colchicine 0.6 mg tab: take one pill daily    -decrease hydroxychloroquine (plaquenil) to one pills daily.  Continue follow-up with ophthalmology.     Cyclobenzaprine for muscle spasm: take in the evening.     -pilocarpine: sweating   -try cevimeline for dry mouth

## 2025-03-05 ENCOUNTER — OFFICE VISIT (OUTPATIENT)
Dept: PHYSICAL THERAPY | Age: 70
End: 2025-03-05
Attending: INTERNAL MEDICINE
Payer: MEDICARE

## 2025-03-05 DIAGNOSIS — M47.816 OSTEOARTHRITIS OF LUMBAR SPINE, UNSPECIFIED SPINAL OSTEOARTHRITIS COMPLICATION STATUS: Primary | ICD-10-CM

## 2025-03-05 DIAGNOSIS — M25.511 CHRONIC PAIN OF BOTH SHOULDERS: ICD-10-CM

## 2025-03-05 DIAGNOSIS — G89.29 CHRONIC PAIN OF BOTH SHOULDERS: ICD-10-CM

## 2025-03-05 DIAGNOSIS — M1A.00X1 IDIOPATHIC CHRONIC GOUT WITH TOPHUS, UNSPECIFIED SITE: ICD-10-CM

## 2025-03-05 DIAGNOSIS — M25.512 CHRONIC PAIN OF BOTH SHOULDERS: ICD-10-CM

## 2025-03-05 DIAGNOSIS — M54.2 CERVICALGIA: ICD-10-CM

## 2025-03-05 PROCEDURE — 97110 THERAPEUTIC EXERCISES: CPT

## 2025-03-05 PROCEDURE — 97162 PT EVAL MOD COMPLEX 30 MIN: CPT

## 2025-03-05 NOTE — PROGRESS NOTES
SPINE EVALUATION:     Diagnosis: OA Lumbar spine    Patient:  Shannon Huntley (69 year old, female)        Referring Provider: Helen Feliz  Today's Date   3/5/2025    Precautions:      Date of Evaluation: 03/05/25  Next MD visit: 3 months       PATIENT SUMMARY   Summary of chief complaints: Acute episode of low back pain over the past 2 weeks.  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  Significant findings include:   Shannon  has a past medical history of Abdominal pain, right upper quadrant (1/25/2012), Anxiety state, unspecified, Arthritis, Back pain, Biceps muscle tear, Carpal tunnel syndrome, Depression, Dyspareunia, H/O mammogram (4-10-15), Osteoarthrosis, unspecified whether generalized or localized, unspecified site, Osteopenia, Osteopenia, unspecified location (1/19/2018), Osteoporosis, Pain in joints, PONV (postoperative nausea and vomiting), Rotator cuff syndrome, Spinal cord tumor, Undifferentiated connective tissue disease (HCC), Visual impairment, and Wears glasses.  She  has a past surgical history that includes colonoscopy (2010); revise median n/carpal tunnel surg (2000); repair rotator cuff,chronic (2001,2003); surgery - external (September 2007); hysterectomy (1985); back surgery (2007 ); other surgical history (2006); other surgical history (1/2008); other surgical history  (04/2016); anesth,shoulder replacement (Right, 05/30/2019); colonoscopy; hysterectomy; and Rotator cuff repair.    ASSESSMENT  Shannon presents to physical therapy evaluation with primary c/o Acute episode of low back pain over the past 2 weeks.. The results of the objective tests and measures show Lumbar spine ROM, flexion normal, 50% loss of motion other movements. Functional deficits include but are not limited to Walking 1 mile on treadmill at the gym x 2 week. Signs and symptoms are consistent with diagnosis of OA lumbar spine . Pt and PT discussed evaluation findings, pathology, POC and HEP.  Pt voiced understanding and performs HEP correctly without reported pain. Skilled Physical Therapy is medically necessary to address the above impairments and reach functional goals.    OBJECTIVE:    Musculoskeletal:  Observation/Posture: forward head posture   Accessory Motion: n/a   Palpation: Tenderness paraspinal muscles        ROM and Strength:  (* denotes performed with pain)  Trunk ROM MMT (-/5)     Flex Reach to floor 3     Ext 50% 3    R L R L     Side bend 50% 50% 3 3     Rotation 50% 50% 3 3       Flexibility:  LE Flexibility R L     Hip Flexor min restricted min restricted     Hamstrings WNL WNL     ITB         Piriformis mod restricted mod restricted     Quads mod restricted mod restricted     Gastroc-soleus min restricted min restricted       Neurological:  Sensation: grossly intact to light touch MARY UE/LE        Peripheral Neurodynamic: WNL    Balance and Functional Mobility:  Gait: pt ambulates on level ground with normal mechanics.  Balance: SLS: EO R 0 sec, EO L 0 sec          Today's Treatment and Response:   Pt education was provided on exam findings, treatment diagnosis, treatment plan, expectations, and prognosis.  Today's Treatment       3/5/2025   Spine Treatment   Therapeutic Exercise Hook lying gentle LTR x 10                  - bent leg lift x 10 R/L                  - figure 4 hip ER x 3  R/L  Hamstring stretch 30 sec hold R/L  S Lying clam x 10 R/L  S Lying manual hip flexor stretch 3 x 10 sec hold  Seated knee ext x 10 R/L   Therapeutic Exercise Minutes 10   Evaluation Minutes 35   Total Time Of Timed Procedures 10   Total Time Of Service-Based Procedures 35   Total Treatment Time 45   HEP Seated LAQ R/L x 10  Hook lying LTR x 10                    Bent leg lift x 10 R/L        Patient was instructed in and issued a HEP for: Seated LAQ R/L x 10  Hook lying LTR x 10                    Bent leg lift x 10 R/L    Charges:  PT EVAL: Moderate Complexity, EX 1  In agreement with evaluation findings and clinical rationale, this evaluation involved MODERATE COMPLEXITY decision making due to 1-2 personal factors/comorbidities, 3 or more body structures involved/activity limitations, and evolving symptoms as documented in the evaluation.                                                                         PLAN OF CARE:    Goals: (to be met in 8 visits)   1. increase core strength to 4/5   2. improve pirifomis, quadriceps flexibility to minimal tightness to decrease strain on lumbar spine   3. Pt will be able to resume regular activities with low back pain <3/10      Frequency / Duration: Patient will be seen 2 x per weekx/week or a total of 8  visits over a 90 day period. Treatment will include: Manual Therapy; Neuromuscular Re-education; Therapeutic Exercise; Home Exercise Program instruction    Education or treatment limitation: None   Rehab Potential: good     Oswestry Disability Index Score  Score: (Patient-Rptd) 34 % (3/4/2025 11:49 PM)      Patient/Family/Caregiver was advised of these findings, precautions, and treatment options and has agreed to actively participate in planning and for this course of care.    Thank you for your referral. Please co-sign or sign and return this letter via fax as soon as possible to [phone]. If you have any questions, please contact me at Dept:  [phone]    Sincerely,  Electronically signed by therapist: Alyssa Ortez PT  Physician's certification required: Yes  I certify the need for these services furnished under this plan of treatment and while under my care.    X___________________________________________________ Date____________________    Certification From: 3/5/2025  To:6/3/2025

## 2025-03-12 ENCOUNTER — OFFICE VISIT (OUTPATIENT)
Dept: INTERNAL MEDICINE CLINIC | Facility: CLINIC | Age: 70
End: 2025-03-12
Payer: MEDICARE

## 2025-03-12 ENCOUNTER — TELEPHONE (OUTPATIENT)
Dept: INTERNAL MEDICINE CLINIC | Facility: CLINIC | Age: 70
End: 2025-03-12

## 2025-03-12 VITALS
DIASTOLIC BLOOD PRESSURE: 68 MMHG | WEIGHT: 106.81 LBS | TEMPERATURE: 98 F | OXYGEN SATURATION: 97 % | HEIGHT: 57 IN | SYSTOLIC BLOOD PRESSURE: 116 MMHG | BODY MASS INDEX: 23.04 KG/M2 | HEART RATE: 100 BPM

## 2025-03-12 DIAGNOSIS — Z12.31 VISIT FOR SCREENING MAMMOGRAM: ICD-10-CM

## 2025-03-12 DIAGNOSIS — E78.2 MIXED HYPERLIPIDEMIA: ICD-10-CM

## 2025-03-12 DIAGNOSIS — Z00.00 ROUTINE GENERAL MEDICAL EXAMINATION AT A HEALTH CARE FACILITY: Primary | ICD-10-CM

## 2025-03-12 DIAGNOSIS — M32.9 SYSTEMIC LUPUS ERYTHEMATOSUS, UNSPECIFIED SLE TYPE, UNSPECIFIED ORGAN INVOLVEMENT STATUS (HCC): ICD-10-CM

## 2025-03-12 DIAGNOSIS — M35.9 UNDIFFERENTIATED CONNECTIVE TISSUE DISEASE (HCC): ICD-10-CM

## 2025-03-12 DIAGNOSIS — M81.0 AGE-RELATED OSTEOPOROSIS WITHOUT CURRENT PATHOLOGICAL FRACTURE: ICD-10-CM

## 2025-03-12 DIAGNOSIS — I10 PRIMARY HYPERTENSION: ICD-10-CM

## 2025-03-12 DIAGNOSIS — M81.0 AGE-RELATED OSTEOPOROSIS WITHOUT CURRENT PATHOLOGICAL FRACTURE: Primary | ICD-10-CM

## 2025-03-12 DIAGNOSIS — M06.4 INFLAMMATORY POLYARTHRITIS (HCC): ICD-10-CM

## 2025-03-12 NOTE — PROGRESS NOTES
Shannon Huntley is a 69 year old female.     HPI:   Patient presents for the folowing issues and MAS.  HTN - checking pressures a few times/week. They are less than 130/80s.   HLP  - tolerating statin therapy. We increased atorvastatin to 20 mg last year.   Chronic pain - she received an injection her left shoulder but it is still painful. She thinks meloxicam is helping. She is walking every day on treadmill.   Sleep issues - it is fair. Wakes up after 4 hours with pain. She stopped amitryptiline because she thinks she had a reaction but cannot recall what the s/e was.   Gout - usually in her hands. She cannot tell when she is having a flare. Uric acid level is controlled on uloric and colchicine.   Goals of care  - lives alone most of the time. Nephew moved in with her. She is independent for all her ADLs. Has supportive family.   UCTD, SLE  - could not toelrate cevemiline (hot flashes)  Osteoporosis - due for reclast in 4/2025.     REVIEW OF SYSTEMS:   GENERAL HEALTH: feels well otherwise. No f/c  NEURO: denies any headaches LH, dizzyness, LOC. She says she has tripped going UP the stairs a few times. She did not hit her head or injure herself. She was able to get herself up.   VISION: denies any blurred or double vision  RESPIRATORY: denies shortness of breath, cough, or congestion  CARDIOVASCULAR: denies chest pain, pressure or palpitations  GI: denies abdominal pain, diarrhea, n/v, BRBPR, melena. Chronic constipation - well controlled on miralax.   : no dysuria or hematuria or vaginal bleeding  SKIN: denies any unusual skin lesions or rashes  PSYCH: mood is stable. Denies depression or anxiety. She feels she is coping well. She attends [employer] every Sunday and feels refreshed.   EXT: denies edema       Wt Readings from Last 6 Encounters:   03/12/25 106 lb 12.8 oz (48.4 kg)   02/24/25 108 lb (49 kg)   10/09/24 105 lb (47.6 kg)   09/11/24 110 lb 3.2 oz (50 kg)   08/27/24 110 lb 12.8 oz (50.3 kg)   08/07/24 109  lb (49.4 kg)       Allergies   Allergen Reactions    Allopurinol OTHER (SEE COMMENTS)     +HLA-, cannot take allopurinol.     Grass HIVES    Fentanyl NAUSEA AND VOMITING    Morphine NAUSEA AND VOMITING       Family History   Problem Relation Age of Onset    Other (Unknown) Father     Hypertension Mother     Heart Disease Mother     Other (atherosclerosis, PTCA, pacemaker) Mother     Other (lupus) Mother     Heart Disease Maternal Grandmother     Ovarian Cancer Sister 60    Other (Anemia, MI, alcoholic cirrhosis) Other         6 siblings    Cancer Neg     Stroke Neg       Past Medical History:    Abdominal pain, right upper quadrant    Anxiety state, unspecified    Arthritis    Back pain    Biceps muscle tear    Carpal tunnel syndrome    Depression    Dyspareunia    H/O mammogram    Negative    Osteoarthrosis, unspecified whether generalized or localized, unspecified site    Osteopenia    Osteopenia, unspecified location    Osteoporosis    Pain in joints    PONV (postoperative nausea and vomiting)    Rotator cuff syndrome    Spinal cord tumor    c2 intraspinal tumor with cord compression, neurilemmoma     Undifferentiated connective tissue disease (HCC)    Visual impairment    glasses and contacts    Wears glasses      Past Surgical History:   Procedure Laterality Date    Anesth,shoulder replacement Right 05/30/2019    Right reverse total shoulder arthroplasty- Dr. Vaca    Back surgery  2007     cer tumors removed    Colonoscopy  2010    Colonoscopy      Hysterectomy  1985    RUKHSANA and BSO, fibroids, 1987    Hysterectomy      Other surgical history  2006    Other surgical history  1/2008    R toe artificial cartilage, straighten out 4th toe    Other surgical history  04/2016    Cervical spine fusion    Repair rotator cuff,chronic  2001,2003    (bilateral)    Revise median n/carpal tunnel surg  2000    Rotator cuff repair      Surgery - external  September 2007    C-spine surgery for Intraspinal tumor      Social  History:    Social History     Socioeconomic History    Marital status:     Number of children: 1   Occupational History    Occupation: Homemaker     Comment: Off work since  due to disability   Tobacco Use    Smoking status: Former     Current packs/day: 0.00     Average packs/day: 1 pack/day for 32.0 years (32.0 ttl pk-yrs)     Types: Cigarettes     Start date: 3/20/1971     Quit date: 3/20/2003     Years since quittin.9    Smokeless tobacco: Never   Vaping Use    Vaping status: Never Used   Substance and Sexual Activity    Alcohol use: No     Alcohol/week: 0.0 standard drinks of alcohol    Drug use: Not Currently     Comment: in her 20s she used many drugs but never IVDU    Sexual activity: Yes     Partners: Male     Birth control/protection: Hysterectomy   Other Topics Concern    Caffeine Concern Yes     Comment: 1 cup of coffee daily    Stress Concern No    Weight Concern No    Special Diet No    Exercise No    Seat Belt Yes     Social Drivers of Health     Food Insecurity: No Food Insecurity (2024)    Food Insecurity     Food Insecurity: Never true   Transportation Needs: No Transportation Needs (2024)    Transportation Needs     Lack of Transportation: No   Stress: No Stress Concern Present (2024)    Stress     Feeling of Stress : No   Housing Stability: Low Risk  (2024)    Housing Stability     Housing Instability: No             EXAM:   /68 (BP Location: Left arm, Patient Position: Sitting, Cuff Size: adult)   Pulse 100   Temp 98.3 °F (36.8 °C) (Temporal)   Ht 4' 9\" (1.448 m)   Wt 106 lb 12.8 oz (48.4 kg)   SpO2 97%   BMI 23.11 kg/m²   GENERAL: A&O well developed, well nourished,in no apparent distress  SKIN: no rashes,no suspicious lesions  HEENT: atraumatic, MMM, throat is clear  NECK: supple, no jvd, no thyromegaly  LUNGS: clear to auscultation bilateraly, no c/w/r  CARDIO: RRR without g/m/r  GI: soft non tender nondistended no hsm bs throughout  NEURO: CN  2-12 grossly intact  PSYCH: pleasant  EXTREMITIES: no cyanosis, clubbing or edema    ASSESSMENT AND PLAN:   # Chronic Pain - multifactorial. Many drugs cause side effects. Fair control on current regimen of daily meloxicam with prn tylenol #3. Cont to reinforce stress reduction and daily HEP. She is also participating in PT  # Insomnia 2/2 Pain: due to above. sleep hygiene, ambien, and trazodone, amitritptyiline, have not helped.  # Recurrent BV and Dyspareunia/vaginal dryness: last BV infection in 6/2023. Cont vaginal estrogen.   # HLP and high CT calcium score - repeat lipids on atorvastatin 20 mg.   # Primary HTN - well controlled on amlodipine.   # Cervical stenosis, disc herniation with radiculopathy and history of cervical spine tumor, cervical kyphosis: chronic,   - s/p L C3-6 ACDF by Dr. Domingo Gallardo in 4/2016 to stabilize spinal cord.    - underwent b/l facet joint injections, tylenol #4 by Dr. Will but this was not helpful.   - gabapentin, tylenol, meloxicam, voltaren,  lidoerm patches, duloxetine, PT, HEP have not helped  # Inflammatory polyarthritis UCTD and SLE - On plaquenil. Does not tolerate prednisone (palpitations).   - Could not tolerate gabapentin, lyrica, lexapro, cevemiline, or cymbalta b/c of side effects.  # Osteoporosis: per DEXA in 1/2024. Started reclast on 4/2024, completed paperwork for dose next month.   - alendronate (worsened her GERD).  educated on dietary calcium/vit D3 and weight bearing exericses and fall prevention.  # Vitamin D deficiency: continue daily supplement  # Health Maintenance: medicare supervisit in 3/2025  Colon Cancer Screening: colonoscopy in 5/2022 by Dawson Majano was normal. Repeat in 5 years (2027) for personal history of colon polyps.   Breast Cancer Screening: cont yearly mammogram   Bone Health: see above.   Vaccines:  I have advised her on all indicated vaccines.   Healthcare Living Will, Medical POA:  daughter, Salima Ferris  Hep C Screen: neg in 2017      Care Team:  Podiatry- Dr. Lynch  Ortho spine - Devang hernandes - Carlos Eduardo Manjarrez - Clifton Schneider        The patient indicates understanding of these issues and agrees to the plan.  The patient is asked to return in 1 year for GIANA Perez MD

## 2025-03-12 NOTE — PATIENT INSTRUCTIONS
News not Noise by Helena Antonio    You are due for your reclast (bone injection) on/after 4/30/2025. We are completing the paperwork today. Please call [phone].    Please complete labs for Dr. Fleiz and I one week before your appointment with him.     I recommend the following vaccines -  TDAP (tetanus, diptheriae, pertussis) vaccine every 10 years.     Shingrix vaccine once in a lifetime. It is a two step vaccine given 2-6 months apart.     RSV vaccine for everyone over age 75 and those ages 60-74 with chronic heart, lung, kidney and liver conditions.     Annual FLU every September, October.    Stay up to date with COVID vaccines.

## 2025-03-13 NOTE — TELEPHONE ENCOUNTER
Patient notified of authorized reclast order via [facility]. Order faxed to [facility]: [phone]. Confirmation received.

## 2025-03-17 ENCOUNTER — HOSPITAL ENCOUNTER (OUTPATIENT)
Dept: GENERAL RADIOLOGY | Age: 70
Discharge: HOME | End: 2025-03-17
Attending: INTERNAL MEDICINE
Payer: MEDICARE

## 2025-03-17 DIAGNOSIS — M47.816 OSTEOARTHRITIS OF LUMBAR SPINE, UNSPECIFIED SPINAL OSTEOARTHRITIS COMPLICATION STATUS: ICD-10-CM

## 2025-03-17 DIAGNOSIS — M79.641 BILATERAL HAND PAIN: ICD-10-CM

## 2025-03-17 DIAGNOSIS — M06.4 INFLAMMATORY POLYARTHRITIS (HCC): ICD-10-CM

## 2025-03-17 DIAGNOSIS — M54.2 CERVICALGIA: ICD-10-CM

## 2025-03-17 DIAGNOSIS — G89.29 CHRONIC PAIN OF BOTH SHOULDERS: ICD-10-CM

## 2025-03-17 DIAGNOSIS — M25.512 CHRONIC PAIN OF BOTH SHOULDERS: ICD-10-CM

## 2025-03-17 DIAGNOSIS — M25.511 CHRONIC PAIN OF BOTH SHOULDERS: ICD-10-CM

## 2025-03-17 DIAGNOSIS — M79.642 BILATERAL HAND PAIN: ICD-10-CM

## 2025-03-17 DIAGNOSIS — M1A.00X1 IDIOPATHIC CHRONIC GOUT WITH TOPHUS, UNSPECIFIED SITE: ICD-10-CM

## 2025-03-17 PROCEDURE — 73130 X-RAY EXAM OF HAND: CPT | Performed by: INTERNAL MEDICINE

## 2025-03-17 PROCEDURE — 72050 X-RAY EXAM NECK SPINE 4/5VWS: CPT | Performed by: INTERNAL MEDICINE

## 2025-03-19 ENCOUNTER — HOSPITAL ENCOUNTER (OUTPATIENT)
Dept: GENERAL RADIOLOGY | Age: 70
Discharge: HOME | End: 2025-03-19
Attending: INTERNAL MEDICINE
Payer: MEDICARE

## 2025-03-19 DIAGNOSIS — M1A.00X1 IDIOPATHIC CHRONIC GOUT WITH TOPHUS, UNSPECIFIED SITE: ICD-10-CM

## 2025-03-19 DIAGNOSIS — M25.511 CHRONIC PAIN OF BOTH SHOULDERS: ICD-10-CM

## 2025-03-19 DIAGNOSIS — M54.2 CERVICALGIA: ICD-10-CM

## 2025-03-19 DIAGNOSIS — M47.816 OSTEOARTHRITIS OF LUMBAR SPINE, UNSPECIFIED SPINAL OSTEOARTHRITIS COMPLICATION STATUS: ICD-10-CM

## 2025-03-19 DIAGNOSIS — G89.29 CHRONIC PAIN OF BOTH SHOULDERS: ICD-10-CM

## 2025-03-19 DIAGNOSIS — M25.512 CHRONIC PAIN OF BOTH SHOULDERS: ICD-10-CM

## 2025-03-19 PROCEDURE — 72110 X-RAY EXAM L-2 SPINE 4/>VWS: CPT | Performed by: INTERNAL MEDICINE

## 2025-03-24 RX ORDER — AMLODIPINE BESYLATE 2.5 MG/1
2.5 TABLET ORAL EVERY EVENING
Qty: 90 TABLET | Refills: 0 | Status: SHIPPED | OUTPATIENT
Start: 2025-03-24

## 2025-03-24 NOTE — TELEPHONE ENCOUNTER
PASS  LOV 3/12/25  FU 1 YEAR      Future Appointments   Date Time Provider Department Center   3/26/2025  8:45 AM Alyssa Ortez, PT [facility] [place]   4/2/2025  8:45 AM Alyssa Ortez, PT [facility] [place]   4/9/2025  8:00 AM Alyssa Ortez, PT [facility] [place]   4/16/2025  8:00 AM Alyssa Ortez, PT [facility] [place]   4/23/2025  8:00 AM Alyssa Ortez, PT [facility] [place]   4/30/2025  8:00 AM Alyssa Ortez, PT [facility] [place]   6/18/2025  8:45 AM Helen Feliz MD [facility] [place]

## 2025-03-26 ENCOUNTER — OFFICE VISIT (OUTPATIENT)
Dept: PHYSICAL THERAPY | Age: 70
End: 2025-03-26
Attending: INTERNAL MEDICINE
Payer: MEDICARE

## 2025-03-26 ENCOUNTER — PATIENT OUTREACH (OUTPATIENT)
Dept: CASE MANAGEMENT | Age: 70
End: 2025-03-26

## 2025-03-26 DIAGNOSIS — I10 PRIMARY HYPERTENSION: Primary | ICD-10-CM

## 2025-03-26 DIAGNOSIS — E78.2 MIXED HYPERLIPIDEMIA: ICD-10-CM

## 2025-03-26 PROCEDURE — 97110 THERAPEUTIC EXERCISES: CPT

## 2025-03-26 NOTE — PROGRESS NOTES
Spoke to Shannon for [facility] -  monthly call     Updates to patient care team/ comments: Up-to-date     Patient reported changes in medications: None  Med Adherence  Comment: Taking as directed    Health Maintenance:  Reviewed with patient.  Health Maintenance   Topic Date Due    Zoster Vaccines (1 of 2) Never done    COVID-19 Vaccine (4 - 2024-25 season) 09/01/2024    Influenza Vaccine (1) 10/01/2024    Mammogram  01/04/2025    Colorectal Cancer Screening  05/12/2027    DEXA Scan  Completed    Annual Well Visit  Completed    Annual Depression Screening  Completed    Fall Risk Screening (Annual)  Completed    Pneumococcal Vaccine: 50+ Years  Completed    Meningococcal B Vaccine  Aged Out       Patient current concerns:   [facility] spoke with the patient this afternoon and she reports doing well.   Her next Reclast dose has been approved. The patient will call and schedule appointment. The patient is asking when will need to repeat her DEXA scan. [facility] will reach out to Dr. Perez and ask on her behalf. Her last Dexa was 1/4/24.     The patient has started physical therapy. She is also doing home exercises. The patient continues to go to the gym, but only works on her lower half as left shoulder is causing her pain. She takes Cyclobenzaprine as needed. She is no longer taking Tylenol 3.    She saw Dr. Vaca on 3/5/25 and was given a steroid injection. She normally has relief from pain quickly but this time it took 10 days before she noticed a difference. The shoulder pain has also disrupted her sleep.      The patient had her physical om 3/12/25. Her blood pressure was in the normal range, 116/68. Her blood work revealed that her gout has improved.     The patient has no other concerns or questions at this time.  She needs no refills on her medications.     Goals/Action Plan:    Active goal from previous outreach:     Patient reported progress towards goals: The patient continues to work towards goals.  What: The patient had her  physical om 3/12/25. Her blood pressure was in the normal range, 116/68. Her blood work revealed that her gout has improved.   Where/When/How: Her next Reclast dose has been approved. The patient will call and schedule appointment. The patient is asking when will need to repeat her DEXA scan. [facility] will reach out to Dr. Perez and ask on her behalf. Her last Dexa was 1/4/24.     Patient Reported Barriers and Concerns: The patient has started physical therapy. She is also doing home exercises. The patient continues to go to the gym, but only works on her lower half as left shoulder is causing her pain. She takes Cyclobenzaprine as needed. She is no longer taking Tylenol 3.  She saw Dr. Vaca on 3/5/25 and was given a steroid injection. She normally has relief from pain quickly but this time it took 10 days before she noticed a difference. The shoulder pain has also disrupted her sleep.    Plan for overcoming barriers: The patient will continue to follow up with car team as needed or as scheduled and report any new symptoms.       Care managers interventions:    These interventions aim to enhance patient knowledge, improve their ability to manage their health, and ultimately lead to better health outcomes.     A message has been sent to Dr. Perez in regards to date of next DEXA scan   Self care: Encouraged the patient to take time to do the things the love and enjoy.    Preventive Health Education: The patient was encouraged to adhere to health screenings, vaccinations , tests, and scheduled appointments.   Physical Activity: Encouraged the patient to stay active to improve physical and mental well-being.  Medication Adherence: Reviewed the patients current medication regimen to ensure proper adherence.   Blood Pressure: Encouraged the patient to continue regular blood pressure monitoring at home and to record readings daily.     Appointments reviewed with patient.   Future Appointments   Date Time Provider Department  Center   4/2/2025  8:45 AM Alyssa Ortez, PT [facility] [place]   4/9/2025  8:00 AM Alyssa Ortez, PT [facility] [place]   4/16/2025  8:00 AM Alyssa Ortez, PT [facility] [place]   4/23/2025  8:00 AM Alyssa Ortez, PT [facility] [place]   4/30/2025  8:00 AM Alyssa Ortez, PT [facility] [place]   6/18/2025  8:45 AM Helen Feliz MD [facility] [place]        Next Care Manager Follow Up Date: 1 Month     Reason For Follow Up: review progress and or barriers towards patient's goals.     Time Spent This Encounter Total: 30 minutes medical record review, telephone communication, care plan updates where needed, education, goals, and action plan recreation/update. Provided acknowledgment and validation to patient's concerns.   Monthly Minute Total including today: 30 minutes  Physical assessment, complete health history, and need for CCM established by Kendra Perez MD.

## 2025-03-26 NOTE — PROGRESS NOTES
Patient: Shannon Huntley (69 year old, female) Referring Provider:  Insurance:   Diagnosis:   Dawen Zhang MEDICARE   Date of Surgery: No data recorded Next MD visit:  COMMERCIAL   Precautions:    3 months Referral Information:    Date of Evaluation: Req: 0, Auth: 0, Exp:     03/05/25 POC Auth Visits:          Today's Date   3/26/2025    Subjective  X ray cervical and lumbar spine showed degenerative changes. Had a cortisone injection L shoulder which helped to decrease the pain over 10 days, now able to function. In pain daily due to all medical conditions, moving does help keep the pain manageable.       Pain: 5/10     Objective  Core strength 3+/5. Hip strength 3/5        Assessment  Progressed exercises today to include conditioning, LE strengthening. Issued yellow t band for side stepping to add to HEP    Goals (to be met in 8 visits)   1. increase core strength to 4/5   2. improve pirifomis, quadriceps flexibility to minimal tightness to decrease strain on lumbar spine   3. Pt will be able to resume regular activities with low back pain <3/10          Plan  Core and LE strengthening, lumbar spine ROM, gentle stretching    Treatment Last 4 Visits  Treatment Day: 2       3/5/2025 3/26/2025   Spine Treatment   Therapeutic Exercise Hook lying gentle LTR x 10                  - bent leg lift x 10 R/L                  - figure 4 hip ER x 3 R/L  Hamstring stretch 30 sec hold R/L  S Lying clam x 10 R/L  S Lying manual hip flexor stretch 3 x 10 sec hold  Seated knee ext x 10 R/L Nu step level 4 5 mins UE/LE  Hook lying gentle LTR x 10                  - bent leg lift x 10 R/L                  - figure 4 hip ER x 3 R/L                  - bridging 2 x 10  Hamstring stretch 30 sec hold R/L  S Lying clam x 10 R/L  S Lying manual hip flexor stretch 3 x 10 sec hold  Shuttle DL press 5 bands x 30  Side stepping in parallel bars with yellow t band 10 feet x 3 ea R/L     Manual Therapy  STM across lumbar spine and lumbar  paraspinals 8 mins   Therapeutic Exercise Minutes 10 45   Evaluation Minutes 35    Total Time Of Timed Procedures 10 45   Total Time Of Service-Based Procedures 35 0   Total Treatment Time 45 45   HEP Seated LAQ R/L x 10  Hook lying LTR x 10                    Bent leg lift x 10 R/L Seated LAQ R/L x 10  Hook lying LTR x 10                    Bent leg lift x 10 R/L                    Bridging 2 x 10  Side stepping with yellow t band 40 feet R/L        HEP  Seated LAQ R/L x 10  Hook lying LTR x 10                    Bent leg lift x 10 R/L                    Bridging 2 x 10  Side stepping with yellow t band 40 feet R/L    Charges  EX 3

## 2025-03-31 ENCOUNTER — TELEPHONE (OUTPATIENT)
Dept: INTERNAL MEDICINE CLINIC | Facility: CLINIC | Age: 70
End: 2025-03-31

## 2025-03-31 NOTE — TELEPHONE ENCOUNTER
Future Appointments   Date Time Provider Department Center   4/2/2025  8:45 AM Alyssa Ortez, PT [facility] [place]   4/9/2025  8:00 AM Alyssa Ortez, PT [facility] [place]   4/16/2025  8:00 AM Alyssa Ortez, PT [facility] [place]   4/23/2025  8:00 AM Alyssa Ortez, PT [facility] [place]   4/30/2025  8:00 AM Alyssa Ortez, PT [facility] [place]   6/18/2025  8:45 AM [facility]       [facility] spoke with the patient and she was inquiring about when she is due for her next DEXA scan?  Her last scan was on 1/4/24.  She is on Reclast and her next dose is due 4/30/25.     Please advise

## 2025-04-02 ENCOUNTER — OFFICE VISIT (OUTPATIENT)
Dept: PHYSICAL THERAPY | Age: 70
End: 2025-04-02
Attending: INTERNAL MEDICINE
Payer: MEDICARE

## 2025-04-02 PROCEDURE — 97110 THERAPEUTIC EXERCISES: CPT

## 2025-04-02 PROCEDURE — 97140 MANUAL THERAPY 1/> REGIONS: CPT

## 2025-04-02 NOTE — PROGRESS NOTES
Patient: Shannon Huntley (69 year old, female) Referring Provider:  Insurance:   Diagnosis:   Dawen Zhang MEDICARE   Date of Surgery: No data recorded Next MD visit:  COMMERCIAL   Precautions:    3 months Referral Information:    Date of Evaluation: Req: 0, Auth: 0, Exp:     03/05/25 POC Auth Visits:  8       Today's Date   4/2/2025    Subjective  Increased low back pain for the past day or so since working out the hamstrings on Monday. I was bending forwards holding 20# wts and did that 30 times.       Pain: 8/10     Objective  Lumbar spine ROM - flexion reach to floor, extension normal, s flexion normal, rotation min loss. Tenderness lumbar paraspinals. Joint deformities of the fingers        Assessment  Pt reported less pain at the end of therapy session, pt was educated to avoid forward flexion whilst holding wts due to the risk of compression fx's in the spine (she has Osteoporosis) Instructed in core strengthening, glute strenthening and hamstring stretching in lying.    Goals (to be met in 8 visits)   1. increase core strength to 4/5   2. improve pirifomis, quadriceps flexibility to minimal tightness to decrease strain on lumbar spine   3. Pt will be able to resume regular activities with low back pain <3/10              Plan  Core and LE strengthening, lumbar spine ROM, gentle stretching    Treatment Last 4 Visits  Treatment Day: 3       3/5/2025 3/26/2025 4/2/2025   Spine Treatment   Therapeutic Exercise Hook lying gentle LTR x 10                  - bent leg lift x 10 R/L                  - figure 4 hip ER x 3 R/L  Hamstring stretch 30 sec hold R/L  S Lying clam x 10 R/L  S Lying manual hip flexor stretch 3 x 10 sec hold  Seated knee ext x 10 R/L Nu step level 4 5 mins UE/LE  Hook lying gentle LTR x 10                  - bent leg lift x 10 R/L                  - figure 4 hip ER x 3 R/L                  - bridging 2 x 10  Hamstring stretch 30 sec hold R/L  S Lying clam x 10 R/L  S Lying manual hip flexor  stretch 3 x 10 sec hold  Shuttle DL press 5 bands x 30  Side stepping in parallel bars with yellow t band 10 feet x 3 ea R/L   Nu step level 4 5 mins UE/LE  Hook lying gentle LTR x 10                  - bent leg lift x 10 R/L                  - SLR x 10 R/L                  - figure 4 hip ER x 3 R/L                  - bridging 2 x 10  Hamstring stretch 30 sec hold R/L  S Lying clam x 10 R/L  S Lying manual hip flexor stretch 3 x 10 sec hold  Shuttle DL press 5 bands x 30  Side stepping in parallel bars with yellow t band 10 feet x 3 ea R/L     Manual Therapy  STM across lumbar spine and lumbar paraspinals 8 mins STM across lumbar spine and lumbar paraspinals 8 mins   Therapeutic Exercise Minutes 10 45 30   Manual Therapy Minutes   8   Evaluation Minutes 35     Total Time Of Timed Procedures 10 45 38   Total Time Of Service-Based Procedures 35 0 0   Total Treatment Time 45 45 38   HEP Seated LAQ R/L x 10  Hook lying LTR x 10                    Bent leg lift x 10 R/L Seated LAQ R/L x 10  Hook lying LTR x 10                    Bent leg lift x 10 R/L                    Bridging 2 x 10  Side stepping with yellow t band 40 feet R/L Hook lying LTR x 10                    Bent leg march x 10                    Bent leg fallout x 10                    Bridging x 10                    Hamstring stretch with ankle DF x 30 sec hold  Sit to stand x 10  Side stepping with yellow t band   Standing hip ext, hip abd with yellow band x 10        HEP  Hook lying LTR x 10                    Bent leg march x 10                    Bent leg fallout x 10                    Bridging x 10                    Hamstring stretch with ankle DF x 30 sec hold  Sit to stand x 10  Side stepping with yellow t band   Standing hip ext, hip abd with yellow band x 10    Charges  EX 2, MT 1

## 2025-04-09 ENCOUNTER — OFFICE VISIT (OUTPATIENT)
Dept: PHYSICAL THERAPY | Age: 70
End: 2025-04-09
Attending: INTERNAL MEDICINE
Payer: MEDICARE

## 2025-04-09 PROCEDURE — 97110 THERAPEUTIC EXERCISES: CPT

## 2025-04-09 NOTE — PROGRESS NOTES
Patient: Shannon Huntley (69 year old, female) Referring Provider:  Insurance:   Diagnosis:   Dawen Zhang MEDICARE   Date of Surgery: No data recorded Next MD visit:  COMMERCIAL   Precautions:    3 months Referral Information:    Date of Evaluation: Req: 0, Auth: 0, Exp:     03/05/25 POC Auth Visits:  8       Today's Date   4/9/2025    Subjective          Pain: 4/10     Objective: Tightness R hip ER with figure 4 stretch                     Mild weakness R glute 3+/5    Assessment: Progressing well, pt reports she no longer feels sharp pain in the lumbar spine.        Goals (to be met in 8 visits)   1. increase core strength to 4/5   2. improve pirifomis, quadriceps flexibility to minimal tightness to decrease strain on lumbar spine   3. Pt will be able to resume regular activities with low back pain <3/10             Plan: Continue PT 1 x per week for core strengthening, LE strengthening and stretching.       Treatment Last 4 Visits  Treatment Day: 4       3/5/2025 3/26/2025 4/2/2025 4/9/2025   Spine Treatment   Therapeutic Exercise Hook lying gentle LTR x 10                  - bent leg lift x 10 R/L                  - figure 4 hip ER x 3 R/L  Hamstring stretch 30 sec hold R/L  S Lying clam x 10 R/L  S Lying manual hip flexor stretch 3 x 10 sec hold  Seated knee ext x 10 R/L Nu step level 4 5 mins UE/LE  Hook lying gentle LTR x 10                  - bent leg lift x 10 R/L                  - figure 4 hip ER x 3 R/L                  - bridging 2 x 10  Hamstring stretch 30 sec hold R/L  S Lying clam x 10 R/L  S Lying manual hip flexor stretch 3 x 10 sec hold  Shuttle DL press 5 bands x 30  Side stepping in parallel bars with yellow t band 10 feet x 3 ea R/L   Nu step level 4 5 mins UE/LE  Hook lying gentle LTR x 10                  - bent leg lift x 10 R/L                  - SLR x 10 R/L                  - figure 4 hip ER x 3 R/L                  - bridging 2 x 10  Hamstring stretch 30 sec hold R/L  S Lying clam x  10 R/L  S Lying manual hip flexor stretch 3 x 10 sec hold  Shuttle DL press 5 bands x 30  Side stepping in parallel bars with yellow t band 10 feet x 3 ea R/L   Nu step level 5 5 mins UE/LE  Hook lying gentle LTR x 10                  - bent leg lift x 10 R/L                  - SLR 2 x 10 R/L                  - figure 4 hip ER stretch 2 x 30 sec hold R/L                  - bridging x 20  Hamstring stretch 30 sec hold R/L  S Lying clam x 20 R/L  S Lying manual hip flexor stretch 3 x 10 sec hold  Shuttle DL press 6 bands x 30  Side stepping in parallel bars with red t band 10 feet x 3 ea R/L     Manual Therapy  STM across lumbar spine and lumbar paraspinals 8 mins STM across lumbar spine and lumbar paraspinals 8 mins    Therapeutic Exercise Minutes 10 45 30 45   Manual Therapy Minutes   8    Evaluation Minutes 35      Total Time Of Timed Procedures 10 45 38 45   Total Time Of Service-Based Procedures 35 0 0 0   Total Treatment Time 45 45 38 45   HEP Seated LAQ R/L x 10  Hook lying LTR x 10                    Bent leg lift x 10 R/L Seated LAQ R/L x 10  Hook lying LTR x 10                    Bent leg lift x 10 R/L                    Bridging 2 x 10  Side stepping with yellow t band 40 feet R/L Hook lying LTR x 10                    Bent leg march x 10                    Bent leg fallout x 10                    Bridging x 10                    Hamstring stretch with ankle DF x 30 sec hold  Sit to stand x 10  Side stepping with yellow t band   Standing hip ext, hip abd with yellow band x 10 Hook lying LTR x 10                    Bent leg march x 10                    Bent leg fallout x 10                    Bridging x 10                    Hamstring stretch with ankle DF x 30 sec hold  Sit to stand x 10  Side stepping with red t band   Standing hip ext, hip abd with red band x 10          HEP  Hook lying LTR x 10                    Bent leg march x 10                    Bent leg fallout x 10                    Bridging x  10                    Hamstring stretch with ankle DF x 30 sec hold  Sit to stand x 10  Side stepping with red t band   Standing hip ext, hip abd with red band x 10      Charges  EX 3

## 2025-04-16 ENCOUNTER — APPOINTMENT (OUTPATIENT)
Dept: PHYSICAL THERAPY | Age: 70
End: 2025-04-16
Attending: INTERNAL MEDICINE
Payer: MEDICARE

## 2025-04-22 ENCOUNTER — PATIENT OUTREACH (OUTPATIENT)
Dept: CASE MANAGEMENT | Age: 70
End: 2025-04-22

## 2025-04-22 DIAGNOSIS — E78.2 MIXED HYPERLIPIDEMIA: ICD-10-CM

## 2025-04-22 DIAGNOSIS — I10 PRIMARY HYPERTENSION: Primary | ICD-10-CM

## 2025-04-22 DIAGNOSIS — M32.9 SYSTEMIC LUPUS ERYTHEMATOSUS, UNSPECIFIED SLE TYPE, UNSPECIFIED ORGAN INVOLVEMENT STATUS (HCC): ICD-10-CM

## 2025-04-22 NOTE — PROGRESS NOTES
Spoke to Shannon moran [facility] -  monthly call     Updates to patient care team/ comments: Up-to-date /Reviewed with patient ()    Patient reported changes in medications: None/()  Med Adherence  Comment: Taking as directed    Health Maintenance:  Reviewed with patient.()   Health Maintenance   Topic Date Due    Zoster Vaccines (1 of 2) Never done    COVID-19 Vaccine (4 - 2024-25 season) 09/01/2024    Mammogram  01/04/2025    Influenza Vaccine (Season Ended) 10/01/2025    Colorectal Cancer Screening  05/12/2027    DEXA Scan  Completed    Annual Well Visit  Completed    Annual Depression Screening  Completed    Fall Risk Screening (Annual)  Completed    Pneumococcal Vaccine: 50+ Years  Completed    Meningococcal B Vaccine  Aged Out       Patient current concerns:   CCM spoke with the patient today       Goals/Action Plan:    Active goal from previous outreach:     Patient reported progress towards goals: The patient continues to work towards goals.  What:   Where/When/How:     Patient Reported Barriers and Concerns:   Plan for overcoming barriers:       Care managers interventions:    These interventions aim to enhance patient knowledge, improve their ability to manage their health, and ultimately lead to better health outcomes.     Self care: Encouraged the patient to take time to do the things the love and enjoy.    Preventive Health Education: The patient was encouraged to adhere to health screenings, vaccinations, tests, and scheduled appointments.   Physical Activity: Encouraged the patient to stay active to improve physical and mental well-being.  Nutrition:Ensuring the patient is following a balanced diet that meets nutritional needs.  Staying Hydrated: Advised patient to drink plenty of water in order stay hydrated.   Sleep: Encouraged the patient to get adequate sleep and maintain a good sleep schedule.   Medication Adherence: Reviewed the patients current medication regimen to ensure proper adherence.    Medication Adherence: Addressed any concerns about side effects or timing of doses.   Blood Pressure: Encouraged the patient to continue regular blood pressure monitoring at home and to record readings daily.   Diabetes: Discussed importance of proper pedal hygiene, regular foot checks, and tight glucose control.   Staying Hydrated: Advised patient to drink plenty of water in order stay hydrated during hot weather due to high temperatures could cause blood sugar levels to fluctuate especially during physical activity.   Fall Prevention: Provided the patient with fall prevention education, such as ensuring the home environment is free of tripping hazards,using proper footwear, and avoiding sudden movements.   Fall Prevention: Suggested using assistive devices such as a cane or a walker.  Fall Prevention: Suggested discussing the use of assistive devices , such as a cane or a walker, with provider.   Stress: Discussed with the patient about techniques such as mindfulness, relaxation exercises, and other methods to reduce stress.   Smoking cessation: Ensured the patient knows about programs to help quit smoking, including counseling and medication options.  Nocturia: Discussed strategies for managing nocturia, such as reducing fluid intake in the evening and addressing any underlying urinary issues.    UTI: Educated the patient on recognizing early signs of a UTI, such as burning, urgency, cloudy urine, and when to seek medical attention. Discussed good urinary and personal hygiene habits.   CPAP: Reinforced the importance of consistent CPAP use for managing sleep apnea and preventing complications such as fatigue, cardiovascular issues, and poor sleep quality.     Coordinated follow up appointment with  per patient's request.     Resources:      Appointments reviewed with patient.   Future Appointments   Date Time Provider Department Center   4/23/2025  8:00 AM Alyssa Ortze PT [facility]  [place]    4/30/2025  8:00 AM Alyssa Ortez, PT [facility] T [place]   6/18/2025  8:45 AM Helen Feliz MD [facility]        Next Care Manager Follow Up Date: 1 Month     Reason For Follow Up: review progress and or barriers towards patient's goals.     Time Spent This Encounter Total: 23 minutes medical record review, telephone communication, care plan updates where needed, education, goals, and action plan recreation/update. Provided acknowledgment and validation to patient's concerns.   Monthly Minute Total including today: 23 minutes  Physical assessment, complete health history, and need for CCM established by Kendra Perez MD.

## 2025-04-23 ENCOUNTER — OFFICE VISIT (OUTPATIENT)
Dept: PHYSICAL THERAPY | Age: 70
End: 2025-04-23
Attending: INTERNAL MEDICINE
Payer: MEDICARE

## 2025-04-23 PROCEDURE — 97110 THERAPEUTIC EXERCISES: CPT

## 2025-04-23 NOTE — PROGRESS NOTES
Patient: Shannon Huntley (69 year old, female) Referring Provider:  Insurance:   Diagnosis:   Dawen Zhang MEDICARE   Date of Surgery: No data recorded Next MD visit:  COMMERCIAL   Precautions:    3 months Referral Information:    Date of Evaluation: Req: 0, Auth: 0, Exp:     03/05/25 POC Auth Visits:  8       Today's Date   4/23/2025    Subjective  Doing exercises daily. Doing the stair master at the gym 10-15 mins 2 x per week.       Pain: 3/10     Objective  Core strength 4/5. normal flexibility.        Assessment  Added RDL to HEP. Issued green t band for side stepping and bridging. Pt has resumed normal activities. PT goals met. Complete YANI post QNR next visit.    Goals (to be met in 8 visits)        Plan  Continue PT x 1 per week for progression of strengthening/core stabilization. HEP progression.    Treatment Last 4 Visits  Treatment Day: 5       3/26/2025 4/2/2025 4/9/2025 4/23/2025   Spine Treatment   Therapeutic Exercise Nu step level 4 5 mins UE/LE  Hook lying gentle LTR x 10                  - bent leg lift x 10 R/L                  - figure 4 hip ER x 3 R/L                  - bridging 2 x 10  Hamstring stretch 30 sec hold R/L  S Lying clam x 10 R/L  S Lying manual hip flexor stretch 3 x 10 sec hold  Shuttle DL press 5 bands x 30  Side stepping in parallel bars with yellow t band 10 feet x 3 ea R/L   Nu step level 4 5 mins UE/LE  Hook lying gentle LTR x 10                  - bent leg lift x 10 R/L                  - SLR x 10 R/L                  - figure 4 hip ER x 3 R/L                  - bridging 2 x 10  Hamstring stretch 30 sec hold R/L  S Lying clam x 10 R/L  S Lying manual hip flexor stretch 3 x 10 sec hold  Shuttle DL press 5 bands x 30  Side stepping in parallel bars with yellow t band 10 feet x 3 ea R/L   Nu step level 5 5 mins UE/LE  Hook lying gentle LTR x 10                  - bent leg lift x 10 R/L                  - SLR 2 x 10 R/L                  - figure 4 hip ER stretch 2 x 30 sec hold  R/L                  - bridging x 20  Hamstring stretch 30 sec hold R/L  S Lying clam x 20 R/L  S Lying manual hip flexor stretch 3 x 10 sec hold  Shuttle DL press 6 bands x 30  Side stepping in parallel bars with red t band 10 feet x 3 ea R/L   Nu step level 5 6 mins  Shuttle DL press 8 bands x 20              SL press 6 bands R/L 3 x 12  Side stepping with green band 20 ft x 3 R/L  Hook lying bridging with green t band x 30  SLS fwd reach with hip ext R/L x 5  SLS reach to floor with hip ext R/L x 2  S Lying clam R/L x 20  Prone quads stretch 30 sec hold R/L x 2  Supine abd bracing SLR x 20 R/L  Figure 4 piriformis stretch 30 sec hold R/L x 2  Glute stretch with trunk rotation, ankle over opposite knee 20 sec hold R/L  Sit to stand x 20 with arms out straight @ 90 deg   Manual Therapy STM across lumbar spine and lumbar paraspinals 8 mins STM across lumbar spine and lumbar paraspinals 8 mins     Therapeutic Exercise Minutes 45 30 45 45   Manual Therapy Minutes  8     Total Time Of Timed Procedures 45 38 45 45   Total Time Of Service-Based Procedures 0 0 0 0   Total Treatment Time 45 38 45 45   HEP Seated LAQ R/L x 10  Hook lying LTR x 10                    Bent leg lift x 10 R/L                    Bridging 2 x 10  Side stepping with yellow t band 40 feet R/L Hook lying LTR x 10                    Bent leg march x 10                    Bent leg fallout x 10                    Bridging x 10                    Hamstring stretch with ankle DF x 30 sec hold  Sit to stand x 10  Side stepping with yellow t band   Standing hip ext, hip abd with yellow band x 10 Hook lying LTR x 10                    Bent leg march x 10                    Bent leg fallout x 10                    Bridging x 10                    Hamstring stretch with ankle DF x 30 sec hold  Sit to stand x 10  Side stepping with red t band   Standing hip ext, hip abd with red band x 10   Hook lying LTR x 20                    Bent leg lift/lower x 20                     Bridging with green t band x 30                    Hamstring stretch with ankle DF x 30 sec hold  Sit to stand x 20  Side stepping with green t band to fatigue  Standing hip ext, hip abd with green band x 10 R/L  RDL R/L x 10          HEP  Hook lying LTR x 20                    Bent leg lift/lower x 20                    Bridging with green t band x 30                    Hamstring stretch with ankle DF x 30 sec hold  Sit to stand x 20  Side stepping with green t band to fatigue  Standing hip ext, hip abd with green band x 10 R/L  RDL R/L x 10      Charges  EX 3

## 2025-04-30 ENCOUNTER — OFFICE VISIT (OUTPATIENT)
Dept: PHYSICAL THERAPY | Age: 70
End: 2025-04-30
Attending: INTERNAL MEDICINE
Payer: MEDICARE

## 2025-04-30 PROCEDURE — 97110 THERAPEUTIC EXERCISES: CPT

## 2025-04-30 NOTE — PROGRESS NOTES
Patient: Shannon Huntley (69 year old, female) Referring Provider:  Insurance:   Diagnosis:   Dawen Zhang MEDICARE   Date of Surgery: No data recorded Next MD visit:  COMMERCIAL   Precautions:    3 months Referral Information:    Date of Evaluation: Req: 0, Auth: 0, Exp:     03/05/25 POC Auth Visits:  8       Today's Date   4/30/2025   Progress Summary  Pt has attended 6 visits in Physical Therapy.       Subjective:  Went to the gym 2 x last week, I was doing a lot of running around. The lower back is feeling good but I have soreness in the back of the legs for a day or 2.       Pain: 1/10     Objective  Lumbar spine ROM - WNL        Assessment  Review of HEP, pt is independent in all exercises.  YANI post score 22    Goals (to be met in 8 visits) - all PT goals met  1. increase core strength to 4/5   2. improve pirifomis, quadriceps flexibility to minimal tightness to decrease strain on lumbar spine   3. Pt will be able to resume regular activities with low back pain <3/10      Plan  Discharge to continue HEP at this time    Treatment Last 4 Visits  Treatment Day: 6       4/2/2025 4/9/2025 4/23/2025 4/30/2025   Spine Treatment   Therapeutic Exercise Nu step level 4 5 mins UE/LE  Hook lying gentle LTR x 10                  - bent leg lift x 10 R/L                  - SLR x 10 R/L                  - figure 4 hip ER x 3 R/L                  - bridging 2 x 10  Hamstring stretch 30 sec hold R/L  S Lying clam x 10 R/L  S Lying manual hip flexor stretch 3 x 10 sec hold  Shuttle DL press 5 bands x 30  Side stepping in parallel bars with yellow t band 10 feet x 3 ea R/L   Nu step level 5 5 mins UE/LE  Hook lying gentle LTR x 10                  - bent leg lift x 10 R/L                  - SLR 2 x 10 R/L                  - figure 4 hip ER stretch 2 x 30 sec hold R/L                  - bridging x 20  Hamstring stretch 30 sec hold R/L  S Lying clam x 20 R/L  S Lying manual hip flexor stretch 3 x 10 sec hold  Shuttle DL press 6  bands x 30  Side stepping in parallel bars with red t band 10 feet x 3 ea R/L   Nu step level 5 6 mins  Shuttle DL press 8 bands x 20              SL press 6 bands R/L 3 x 12  Side stepping with green band 20 ft x 3 R/L  Hook lying bridging with green t band x 30  SLS fwd reach with hip ext R/L x 5  SLS reach to floor with hip ext R/L x 2  S Lying clam R/L x 20  Prone quads stretch 30 sec hold R/L x 2  Supine abd bracing SLR x 20 R/L  Figure 4 piriformis stretch 30 sec hold R/L x 2  Glute stretch with trunk rotation, ankle over opposite knee 20 sec hold R/L  Sit to stand x 20 with arms out straight @ 90 deg Nu step level 5 5 mins  Shuttle DL press 8 bands x 20              SL press 6 bands R/L 2 x 20  Side stepping with green band 20 ft x 2 R/L  Hook lying bridging with green t band x 30  SLS fwd reach with hip ext R/L x 10  S Lying clam with green t band R/L x 20  Supine abd bracing alt knee ext from 90/90 position x 20 R/L  Figure 4 piriformis stretch 30 sec hold R/L x 2  Hamstring stretch R/L 3 x 30 sec hold  Sit to stand holding 5# x 12 with arms out straight @ 90 deg  SLS reach to wt on floor R/L x 10     Manual Therapy STM across lumbar spine and lumbar paraspinals 8 mins      Therapeutic Exercise Minutes 30 45 45 45   Manual Therapy Minutes 8      Total Time Of Timed Procedures 38 45 45 45   Total Time Of Service-Based Procedures 0 0 0 0   Total Treatment Time 38 45 45 45   HEP Hook lying LTR x 10                    Bent leg march x 10                    Bent leg fallout x 10                    Bridging x 10                    Hamstring stretch with ankle DF x 30 sec hold  Sit to stand x 10  Side stepping with yellow t band   Standing hip ext, hip abd with yellow band x 10 Hook lying LTR x 10                    Bent leg march x 10                    Bent leg fallout x 10                    Bridging x 10                    Hamstring stretch with ankle DF x 30 sec hold  Sit to stand x 10  Side stepping with red  t band   Standing hip ext, hip abd with red band x 10   Hook lying LTR x 20                    Bent leg lift/lower x 20                    Bridging with green t band x 30                    Hamstring stretch with ankle DF x 30 sec hold  Sit to stand x 20  Side stepping with green t band to fatigue  Standing hip ext, hip abd with green band x 10 R/L  RDL R/L x 10   Hook lying LTR x 20                    Bent leg lift/lower x 20                    Bridging with green t band x 30                    Hamstring stretch with ankle DF x 30 sec hold  Sit to stand x 20  Side stepping with green t band to fatigue  Standing hip ext, hip abd with green band x 10 R/L  RDL R/L x 10          HEP  Hook lying LTR x 20                    Bent leg lift/lower x 20                    Bridging with green t band x 30                    Hamstring stretch with ankle DF x 30 sec hold  Sit to stand x 20  Side stepping with green t band to fatigue  Standing hip ext, hip abd with green band x 10 R/L  RDL R/L x 10      Charges  EX 3

## 2025-05-01 DIAGNOSIS — Z13.79 AFRICAN ANCESTRY REQUIRING POPULATION-SPECIFIC GENETIC SCREENING: ICD-10-CM

## 2025-05-01 DIAGNOSIS — M1A.00X1 IDIOPATHIC CHRONIC GOUT WITH TOPHUS, UNSPECIFIED SITE: ICD-10-CM

## 2025-05-02 NOTE — TELEPHONE ENCOUNTER
Colchicine 0.6 mg    Future Appointments   Date Time Provider Department Center   6/18/2025  8:45 AM Helen Feliz MD [facility] [place]     Last office visit: 2/24/2025    Last fill: 10/9/2024 90 tab, 1 refill

## 2025-05-03 RX ORDER — COLCHICINE 0.6 MG/1
0.6 TABLET ORAL DAILY
Qty: 90 TABLET | Refills: 0 | Status: SHIPPED | OUTPATIENT
Start: 2025-05-03

## 2025-06-14 DIAGNOSIS — M1A.9XX1 TOPHUS OF LEFT HAND DUE TO GOUT: ICD-10-CM

## 2025-06-14 DIAGNOSIS — M1A.00X1 IDIOPATHIC CHRONIC GOUT WITH TOPHUS, UNSPECIFIED SITE: Primary | ICD-10-CM

## 2025-06-15 ENCOUNTER — APPOINTMENT (OUTPATIENT)
Dept: GENERAL RADIOLOGY | Age: 70
End: 2025-06-15
Attending: NURSE PRACTITIONER
Payer: MEDICARE

## 2025-06-15 ENCOUNTER — HOSPITAL ENCOUNTER (OUTPATIENT)
Age: 70
Discharge: HOME | End: 2025-06-15
Payer: MEDICARE

## 2025-06-15 VITALS
HEART RATE: 94 BPM | TEMPERATURE: 98 F | BODY MASS INDEX: 26.24 KG/M2 | OXYGEN SATURATION: 97 % | SYSTOLIC BLOOD PRESSURE: 136 MMHG | RESPIRATION RATE: 20 BRPM | WEIGHT: 125 LBS | DIASTOLIC BLOOD PRESSURE: 61 MMHG | HEIGHT: 58 IN

## 2025-06-15 DIAGNOSIS — R05.1 ACUTE COUGH: Primary | ICD-10-CM

## 2025-06-15 LAB — SARS-COV-2 RNA RESP QL NAA+PROBE: NOT DETECTED

## 2025-06-15 PROCEDURE — 71046 X-RAY EXAM CHEST 2 VIEWS: CPT | Performed by: NURSE PRACTITIONER

## 2025-06-15 PROCEDURE — 99214 OFFICE O/P EST MOD 30 MIN: CPT

## 2025-06-15 PROCEDURE — 94640 AIRWAY INHALATION TREATMENT: CPT

## 2025-06-15 RX ORDER — ALBUTEROL SULFATE 90 UG/1
2 INHALANT RESPIRATORY (INHALATION) EVERY 4 HOURS PRN
Qty: 1 EACH | Refills: 0 | Status: SHIPPED | OUTPATIENT
Start: 2025-06-15 | End: 2025-07-15

## 2025-06-15 RX ORDER — IPRATROPIUM BROMIDE AND ALBUTEROL SULFATE 2.5; .5 MG/3ML; MG/3ML
3 SOLUTION RESPIRATORY (INHALATION) ONCE
Status: COMPLETED | OUTPATIENT
Start: 2025-06-15 | End: 2025-06-15

## 2025-06-15 RX ORDER — PREDNISONE 20 MG/1
20 TABLET ORAL DAILY
Qty: 5 TABLET | Refills: 0 | Status: SHIPPED | OUTPATIENT
Start: 2025-06-15 | End: 2025-06-20

## 2025-06-15 NOTE — ED PROVIDER NOTES
Patient Seen in: [facility]       The following individual(s) verbally consented to be recorded using ambient AI listening technology and understand that they can each withdraw their consent to this listening technology at any point by asking the clinician to turn off or pause the recording:    Patient name: Shannon Huntley  Additional names:        History  No chief complaint on file.    Stated Complaint: Sore Throat    Subjective:   HPI     Shannon Huntley is a 69 year old female who presents with a persistent cough and chest tightness.    She has been experiencing a persistent, unproductive cough for an unspecified duration, accompanied by a sensation of chest tightness. She describes the cough as unproductive, stating 'I can't get anything out.' She feels as though she is 'trying to have an asthma attack.'    She has had laryngitis for about a week, which has affected her voice but has not been painful. She has been using over the counter cough syrup without relief. No sore throat, nasal congestion, or fever.    Her past medical history includes a previous episode of pneumonia. She quit smoking in 2003. She denies being diabetic and reports that oral steroids tend to increase her heart rate slightly.        Objective:     No pertinent past medical history.            No pertinent past surgical history.              No pertinent social history.            Review of Systems   All other systems reviewed and are negative.      Positive for stated complaint: Sore Throat  Other systems are as noted in HPI.  Constitutional and vital signs reviewed.      All other systems reviewed and negative except as noted above.                  Physical Exam    ED Triage Vitals [06/15/25 1110]   /61   Pulse 94   Resp 20   Temp 97.9 °F (36.6 °C)   Temp src Oral   SpO2 97 %   O2 Device None (Room air)       Current Vitals:   Vital Signs  BP: 136/61  Pulse: 94  Resp: 20  Temp: 97.9 °F (36.6 °C)  Temp src:  Oral    Oxygen Therapy  SpO2: 97 %  O2 Device: None (Room air)            Physical Exam  Vitals and nursing note reviewed.   Constitutional:       General: She is not in acute distress.     Appearance: She is well-developed. She is not ill-appearing or toxic-appearing.   HENT:      Right Ear: Tympanic membrane, ear canal and external ear normal.      Left Ear: Tympanic membrane, ear canal and external ear normal.      Nose: No congestion or rhinorrhea.      Mouth/Throat:      Pharynx: No oropharyngeal exudate or posterior oropharyngeal erythema.   Cardiovascular:      Rate and Rhythm: Normal rate and regular rhythm.      Heart sounds: Normal heart sounds.   Pulmonary:      Effort: Pulmonary effort is normal. No respiratory distress.      Comments: Diminished lung sounds to bases  Skin:     General: Skin is warm and dry.   Neurological:      Mental Status: She is alert and oriented to person, place, and time.                 ED Course  Labs Reviewed   RAPID SARS-COV-2 BY PCR - Normal          XR CHEST PA + LAT CHEST ([id number]=[id number])  Result Date: 6/15/2025  CONCLUSION: No acute cardiopulmonary abnormality.   LOCATION:  [facility]   Dictated by (CST): Edmundo Hansen MD on 6/15/2025 at 11:59 AM     Finalized by (CST): Edmundo Hansen MD on 6/15/2025 at 11:59 AM                           Medical Decision Making    Assessment & Plan  Cough and chest tightness  Cough and chest tightness for one week. Diminished breath sounds noted.  - Perform COVID-19 test prior to administering breathing treatment.  - Administer breathing treatment.  - Order chest X-ray to evaluate lung condition.    Differential diagnosis: covid, flu, bronchitis, pneumonia    COVID test negative.  Chest x-ray ordered and independently interpreted by myself as no consolidation.  Lungs significantly improved post neb patient felt better.  She will be discharged on albuterol, prednisone, and over-the-counter Mucinex DM.  Patient diagnosed with acute cough.  All  questions answered.  No hypoxia or respiratory distress.  Spacer was given for inhaler.    Disposition and Plan     Clinical Impression:  1. Acute cough         Disposition:  Discharge  6/15/2025 12:05 pm    Follow-up:  No follow-up provider specified.        Medications Prescribed:  Discharge Medication List as of 6/15/2025 12:10 PM        START taking these medications    Details   albuterol 108 (90 Base) MCG/ACT Inhalation Aero Soln Inhale 2 puffs into the lungs every 4 (four) hours as needed for Wheezing., Normal, Disp-1 each, R-0      predniSONE 20 MG Oral Tab Take 1 tablet (20 mg total) by mouth daily for 5 days., Normal, Disp-5 tablet, R-0                   Supplementary Documentation:

## 2025-06-16 RX ORDER — FEBUXOSTAT 40 MG/1
40 TABLET, FILM COATED ORAL DAILY
Qty: 90 TABLET | Refills: 0 | Status: SHIPPED | OUTPATIENT
Start: 2025-06-16

## 2025-06-16 NOTE — TELEPHONE ENCOUNTER
Febuxostat 40 mg      Last office visit: 2/24/2025    Next Rheum Apt:Visit date not found    Last fill: 10/30/2024 90 tab, 1 refill    Sending pt my chart message to set up follow up appointment

## 2025-06-23 ENCOUNTER — TELEPHONE (OUTPATIENT)
Dept: INTERNAL MEDICINE CLINIC | Facility: CLINIC | Age: 70
End: 2025-06-23

## 2025-06-23 NOTE — TELEPHONE ENCOUNTER
Scheduled!   Future Appointments   Date Time Provider Department Center   6/25/2025 10:00 AM Kendra Perez MD [facility] 8 [facility] [place]   6/28/2025  7:40 AM [facility] [place]

## 2025-06-23 NOTE — TELEPHONE ENCOUNTER
Spoke to patient who reports ongoing cough and chest tightness due to congestion with productive cough with green sputum. Onset >7 days     Patient was seen in Urgent care on 6/15 and was  diagnosed with acute cough. CXR was negative, COVID was negative. Patient was discharged on albuterol, prednisone, and over-the-counter Mucinex DM. Patient completed prednisone prescription and reports some relief while on medication but now feels she is no longer improving. Patient denies fever/chills. Denies chest pain.     Patient requesting medication recommendation.

## 2025-06-23 NOTE — TELEPHONE ENCOUNTER
I can see her at 10AM on 6/25. However, she is on the right medications but it can take a long time for the cough to improve.

## 2025-06-25 ENCOUNTER — HOSPITAL ENCOUNTER (OUTPATIENT)
Dept: GENERAL RADIOLOGY | Age: 70
Discharge: HOME | End: 2025-06-25
Attending: INTERNAL MEDICINE
Payer: MEDICARE

## 2025-06-25 ENCOUNTER — TELEPHONE (OUTPATIENT)
Dept: INTERNAL MEDICINE CLINIC | Facility: CLINIC | Age: 70
End: 2025-06-25

## 2025-06-25 ENCOUNTER — LAB ENCOUNTER (OUTPATIENT)
Dept: LAB | Age: 70
End: 2025-06-25
Attending: INTERNAL MEDICINE
Payer: MEDICARE

## 2025-06-25 ENCOUNTER — TELEPHONE (OUTPATIENT)
Age: 70
End: 2025-06-25

## 2025-06-25 ENCOUNTER — OFFICE VISIT (OUTPATIENT)
Dept: INTERNAL MEDICINE CLINIC | Facility: CLINIC | Age: 70
End: 2025-06-25
Payer: MEDICARE

## 2025-06-25 VITALS
TEMPERATURE: 97 F | DIASTOLIC BLOOD PRESSURE: 60 MMHG | HEIGHT: 57 IN | WEIGHT: 103.81 LBS | OXYGEN SATURATION: 99 % | SYSTOLIC BLOOD PRESSURE: 126 MMHG | HEART RATE: 74 BPM | BODY MASS INDEX: 22.4 KG/M2

## 2025-06-25 DIAGNOSIS — R06.2 WHEEZING: ICD-10-CM

## 2025-06-25 DIAGNOSIS — M81.0 AGE-RELATED OSTEOPOROSIS WITHOUT CURRENT PATHOLOGICAL FRACTURE: Primary | ICD-10-CM

## 2025-06-25 DIAGNOSIS — R05.1 ACUTE COUGH: ICD-10-CM

## 2025-06-25 DIAGNOSIS — R05.1 ACUTE COUGH: Primary | ICD-10-CM

## 2025-06-25 DIAGNOSIS — M25.511 CHRONIC PAIN OF BOTH SHOULDERS: ICD-10-CM

## 2025-06-25 DIAGNOSIS — M1A.00X1 IDIOPATHIC CHRONIC GOUT WITH TOPHUS, UNSPECIFIED SITE: ICD-10-CM

## 2025-06-25 DIAGNOSIS — M54.2 CERVICALGIA: ICD-10-CM

## 2025-06-25 DIAGNOSIS — M47.816 OSTEOARTHRITIS OF LUMBAR SPINE, UNSPECIFIED SPINAL OSTEOARTHRITIS COMPLICATION STATUS: ICD-10-CM

## 2025-06-25 DIAGNOSIS — M25.512 CHRONIC PAIN OF BOTH SHOULDERS: ICD-10-CM

## 2025-06-25 DIAGNOSIS — Z00.00 ROUTINE GENERAL MEDICAL EXAMINATION AT A HEALTH CARE FACILITY: ICD-10-CM

## 2025-06-25 DIAGNOSIS — G89.29 CHRONIC PAIN OF BOTH SHOULDERS: ICD-10-CM

## 2025-06-25 LAB
ALBUMIN SERPL-MCNC: 3.9 G/DL (ref 3.2–4.8)
ALBUMIN/GLOB SERPL: 1.9 {RATIO} (ref 1–2)
ALP LIVER SERPL-CCNC: 60 U/L (ref 55–142)
ALT SERPL-CCNC: 74 U/L (ref 10–49)
ANION GAP SERPL CALC-SCNC: 2 MMOL/L (ref 0–18)
AST SERPL-CCNC: 49 U/L (ref ?–34)
BILIRUB SERPL-MCNC: 0.5 MG/DL (ref 0.2–1.1)
BUN BLD-MCNC: 20 MG/DL (ref 9–23)
CALCIUM BLD-MCNC: 9.5 MG/DL (ref 8.7–10.6)
CHLORIDE SERPL-SCNC: 107 MMOL/L (ref 98–112)
CHOLEST SERPL-MCNC: 172 MG/DL (ref ?–200)
CK SERPL-CCNC: 91 U/L (ref 34–145)
CO2 SERPL-SCNC: 31 MMOL/L (ref 21–32)
CREAT BLD-MCNC: 1.08 MG/DL (ref 0.55–1.02)
EGFRCR SERPLBLD CKD-EPI 2021: 56 ML/MIN/1.73M2 (ref 60–?)
FASTING PATIENT LIPID ANSWER: NO
FASTING STATUS PATIENT QL REPORTED: NO
GLOBULIN PLAS-MCNC: 2.1 G/DL (ref 2–3.5)
GLUCOSE BLD-MCNC: 83 MG/DL (ref 70–99)
HDLC SERPL-MCNC: 72 MG/DL (ref 40–59)
LDLC SERPL CALC-MCNC: 91 MG/DL (ref ?–100)
NONHDLC SERPL-MCNC: 100 MG/DL (ref ?–130)
OSMOLALITY SERPL CALC.SUM OF ELEC: 292 MOSM/KG (ref 275–295)
POTASSIUM SERPL-SCNC: 4.8 MMOL/L (ref 3.5–5.1)
PROT SERPL-MCNC: 6 G/DL (ref 5.7–8.2)
SODIUM SERPL-SCNC: 140 MMOL/L (ref 136–145)
TRIGL SERPL-MCNC: 43 MG/DL (ref 30–149)
URATE SERPL-MCNC: 2.6 MG/DL (ref 3.1–7.8)
VLDLC SERPL CALC-MCNC: 7 MG/DL (ref 0–30)

## 2025-06-25 PROCEDURE — G2211 COMPLEX E/M VISIT ADD ON: HCPCS | Performed by: INTERNAL MEDICINE

## 2025-06-25 PROCEDURE — 80053 COMPREHEN METABOLIC PANEL: CPT

## 2025-06-25 PROCEDURE — 84550 ASSAY OF BLOOD/URIC ACID: CPT

## 2025-06-25 PROCEDURE — 99214 OFFICE O/P EST MOD 30 MIN: CPT | Performed by: INTERNAL MEDICINE

## 2025-06-25 PROCEDURE — 80061 LIPID PANEL: CPT

## 2025-06-25 PROCEDURE — 82550 ASSAY OF CK (CPK): CPT

## 2025-06-25 PROCEDURE — 71046 X-RAY EXAM CHEST 2 VIEWS: CPT | Performed by: INTERNAL MEDICINE

## 2025-06-25 PROCEDURE — 36415 COLL VENOUS BLD VENIPUNCTURE: CPT

## 2025-06-25 RX ORDER — AZITHROMYCIN 250 MG/1
TABLET, FILM COATED ORAL
Qty: 6 TABLET | Refills: 0 | Status: SHIPPED | OUTPATIENT
Start: 2025-06-25 | End: 2025-06-30

## 2025-06-25 RX ORDER — FLUTICASONE PROPIONATE AND SALMETEROL 250; 50 UG/1; UG/1
1 POWDER RESPIRATORY (INHALATION) 2 TIMES DAILY
Qty: 60 EACH | Refills: 2 | Status: SHIPPED | OUTPATIENT
Start: 2025-06-25 | End: 2025-06-29

## 2025-06-25 RX ORDER — FLUCONAZOLE 150 MG/1
150 TABLET ORAL ONCE
Qty: 1 TABLET | Refills: 0 | Status: SHIPPED | OUTPATIENT
Start: 2025-06-25 | End: 2025-06-25

## 2025-06-25 NOTE — PATIENT INSTRUCTIONS
You must take omeprazole 30 minutes prior to breakfast every day as long as you are on meloxicam every day.     Please call [facility] to schedule your reclast (bone injection). PH: [phone]

## 2025-06-25 NOTE — PROGRESS NOTES
Shannon Huntley is a 69 year old female.     HPI:   Patient presents for the folowing issues. Last OV in 3/2025.  Cough - she presented to ED on 6/15/2025 and Rx albuterol and prednisone for chest tightness and PANG. But her cough did not develop until 7 days ago. She did complete prednisone course but did not help her chest tightness. Albuterol tx in ED helped. Albuterol inh helps her chest tightness but causes palpitations so she avoids it. Her cough is dry, continues to have chest tightness. Denies PANG. Flonase is helping her post nasal drainage and rinorrhea. She is also using mucinex. Notices sore throat when layign flat at night, resolves when upright. Cough is not interrupting her sleep. Mild sinus pressure, worsen when laying flat. No ear pain or pressure. Severity of symptoms are stable.   Gout - she thinks she is chronically having attacks due to swollen/painful hands.   Chronic pain - needing meloxicam every day.         REVIEW OF SYSTEMS:   GENERAL HEALTH: no fevrs or chills, feels tired  NEURO: denies any LH, dizzyness, LOC, falls. Had a headache last week that resolved with ibuprofen.   VISION: denies any blurred or double vision. Reminded she is overdue for her routine eye exam for being on HCQ.   RESPIRATORY: see HPI  CARDIOVASCULAR: denies chest pain, pressure or palpitations  GI: denies abdominal pain, constipation, diarrhea, n/v, BRBPR, melena  : no dysuria or hematuria  PSYCH: mood is stable and \"pretty good.\"  EXT: denies edema       Wt Readings from Last 6 Encounters:   06/25/25 103 lb 12.8 oz (47.1 kg)   06/15/25 125 lb (56.7 kg)   03/12/25 106 lb 12.8 oz (48.4 kg)   02/24/25 108 lb (49 kg)   10/09/24 105 lb (47.6 kg)   09/11/24 110 lb 3.2 oz (50 kg)       Allergies   Allergen Reactions    Allopurinol OTHER (SEE COMMENTS)     +HLA-, cannot take allopurinol.     Grass HIVES    Fentanyl NAUSEA AND VOMITING    Morphine NAUSEA AND VOMITING       Family History   Problem Relation Age of  Onset    Other (Unknown) Father     Hypertension Mother     Heart Disease Mother     Other (atherosclerosis, PTCA, pacemaker) Mother     Other (lupus) Mother     Heart Disease Maternal Grandmother     Ovarian Cancer Sister 60    Other (Anemia, MI, alcoholic cirrhosis) Other         6 siblings    Cancer Neg     Stroke Neg       Past Medical History:    Abdominal pain, right upper quadrant    Anxiety state, unspecified    Arthritis    Back pain    Biceps muscle tear    Carpal tunnel syndrome    Depression    Dyspareunia    H/O mammogram    Negative    Osteoarthrosis, unspecified whether generalized or localized, unspecified site    Osteopenia    Osteopenia, unspecified location    Osteoporosis    Pain in joints    PONV (postoperative nausea and vomiting)    Rotator cuff syndrome    Spinal cord tumor    c2 intraspinal tumor with cord compression, neurilemmoma     Undifferentiated connective tissue disease (HCC)    Visual impairment    glasses and contacts    Wears glasses      Past Surgical History:   Procedure Laterality Date    Anesth,shoulder replacement Right 05/30/2019    Right reverse total shoulder arthroplasty- Dr. Vaca    Back surgery  2007     cer tumors removed    Colonoscopy  2010    Colonoscopy      Hysterectomy  1985    RUKHSANA and BSO, fibroids, 1987    Hysterectomy      Other surgical history  2006    Other surgical history  1/2008    R toe artificial cartilage, straighten out 4th toe    Other surgical history  04/2016    Cervical spine fusion    Repair rotator cuff,chronic  2001,2003    (bilateral)    Revise median n/carpal tunnel surg  2000    Rotator cuff repair      Surgery - external  September 2007    C-spine surgery for Intraspinal tumor      Social History:    Social History     Socioeconomic History    Marital status:     Number of children: 1   Occupational History    Occupation: Homemaker     Comment: Off work since 2007 due to disability   Tobacco Use    Smoking status: Former      Current packs/day: 0.00     Average packs/day: 1 pack/day for 32.0 years (32.0 ttl pk-yrs)     Types: Cigarettes     Start date: 3/20/1971     Quit date: 3/20/2003     Years since quittin.2    Smokeless tobacco: Never   Vaping Use    Vaping status: Never Used   Substance and Sexual Activity    Alcohol use: No     Alcohol/week: 0.0 standard drinks of alcohol    Drug use: Not Currently     Comment: in her 20s she used many drugs but never IVDU    Sexual activity: Yes     Partners: Male     Birth control/protection: Hysterectomy   Other Topics Concern    Caffeine Concern Yes     Comment: 1 cup of coffee daily    Stress Concern No    Weight Concern No    Special Diet No    Exercise No    Seat Belt Yes     Social Drivers of Health     Food Insecurity: No Food Insecurity (2024)    Food Insecurity     Food Insecurity: Never true   Transportation Needs: No Transportation Needs (2024)    Transportation Needs     Lack of Transportation: No   Stress: No Stress Concern Present (2024)    Stress     Feeling of Stress : No   Housing Stability: Low Risk  (2024)    Housing Stability     Housing Instability: No           EXAM:   /60 (BP Location: Left arm, Patient Position: Sitting, Cuff Size: adult)   Pulse 74   Temp 97.2 °F (36.2 °C) (Temporal)   Ht 4' 9\" (1.448 m)   Wt 103 lb 12.8 oz (47.1 kg)   SpO2 99%   BMI 22.46 kg/m²   GENERAL: A&O well developed, well nourished,in no apparent distress  SKIN: no rashes,no suspicious lesions  HEENT: atraumatic, MMM, throat with mild pharyngeal erythema but no exudates. R TM with normal light refelx. Slight erythema of left TM.   NECK: supple, no jvd, no thyromegaly  LUNGS: distant lung sounds at RLL. Air movement is slightly diminished but no c/w/r  CARDIO: RRR without g/m/r  GI: soft non tender nondistended no hsm bs throughout  NEURO: CN 2-12 grossly intact  PSYCH: pleasant  EXTREMITIES: no cyanosis, clubbing or edema    ASSESSMENT AND PLAN:   # cough,  chest tightness -  I suspect she has pneumonia. Check CXR and then I will determine course of abx. Albuterol alone is causing palpitations. I will try LICS and combivent. Oral prednisone x 5 days did not help.   # Chronic gout - on daily colchicine and uloric  # Chronic Pain - multifactorial. Many drugs cause side effects. Fair control on current regimen of daily meloxicam with prn tylenol #3. Cont to reinforce stress reduction and daily HEP.  # Insomnia 2/2 Pain: due to above. sleep hygiene, ambien, and trazodone, amitritptyiline, have not helped.  # Recurrent BV and Dyspareunia/vaginal dryness: last BV infection in 6/2023. Cont vaginal estrogen.   # HLP and high CT calcium score - repeat lipids on atorvastatin 20 mg.   # Primary HTN - well controlled on amlodipine.   # Cervical stenosis, disc herniation with radiculopathy and history of cervical spine tumor, cervical kyphosis: chronic,   - s/p L C3-6 ACDF by Dr. Domingo Gallardo in 4/2016 to stabilize spinal cord.    - underwent b/l facet joint injections, tylenol #4 by Dr. Will but this was not helpful.   - gabapentin, tylenol, meloxicam, voltaren,  lidoerm patches, duloxetine, PT, HEP have not helped  # Inflammatory polyarthritis UCTD and SLE - On plaquenil. Does not tolerate prednisone (palpitations).   - Could not tolerate gabapentin, lyrica, lexapro, cevemiline, or cymbalta b/c of side effects.  # Osteoporosis: per DEXA in 1/2024. Started reclast on 4/2024 and overdue for infusion. Reminded today.   - alendronate (worsened her GERD).  educated on dietary calcium/vit D3 and weight bearing exericses and fall prevention.  # Vitamin D deficiency: continue daily supplement  # Health Maintenance: medicare supervisit in 3/2025  Colon Cancer Screening: colonoscopy in 5/2022 by Dawson Majano was normal. Repeat in 5 years (2027) for personal history of colon polyps.   Breast Cancer Screening: cont yearly mammogram   Bone Health: see above.   Vaccines:  I have advised her  on all indicated vaccines.   Healthcare Living Will, Medical POA:  daughter, Salima Ferris  Hep C Screen: neg in 2017     Care Team:  Podiatry- Dr. Cris Hernandes spine - Devang hernandes - Carlos Eduardo  [facility] - Clifton Schneider        The patient indicates understanding of these issues and agrees to the plan.  The patient is asked to return in 1 year for GIANA Perez MD

## 2025-06-27 ENCOUNTER — TELEPHONE (OUTPATIENT)
Dept: INTERNAL MEDICINE CLINIC | Facility: CLINIC | Age: 70
End: 2025-06-27

## 2025-06-27 NOTE — TELEPHONE ENCOUNTER
Incoming fax from [place]   fluticasone-salmeterol (WIXELA INHUB) 250-50 MCG/ACT requiring a PA   Preferred alternatives are Advair diskus 60s 250/50m  Dulera 13gm 200/5MC  Dulera 13GM 50/5  Breyna 10.3GM 80/4.5 or 160/4.5     Please advise if we should proceed with PA

## 2025-06-28 ENCOUNTER — HOSPITAL ENCOUNTER (OUTPATIENT)
Dept: MAMMOGRAPHY | Age: 70
Discharge: HOME | End: 2025-06-28
Attending: INTERNAL MEDICINE
Payer: MEDICARE

## 2025-06-28 DIAGNOSIS — Z12.31 VISIT FOR SCREENING MAMMOGRAM: ICD-10-CM

## 2025-06-28 PROCEDURE — 77067 SCR MAMMO BI INCL CAD: CPT | Performed by: INTERNAL MEDICINE

## 2025-06-28 PROCEDURE — 77063 BREAST TOMOSYNTHESIS BI: CPT | Performed by: INTERNAL MEDICINE

## 2025-06-29 RX ORDER — MOMETASONE FUROATE AND FORMOTEROL FUMARATE DIHYDRATE 100; 5 UG/1; UG/1
1 AEROSOL RESPIRATORY (INHALATION) 2 TIMES DAILY
Qty: 13 G | Refills: 0 | Status: SHIPPED | OUTPATIENT
Start: 2025-06-29

## 2025-07-10 ENCOUNTER — NURSE TRIAGE (OUTPATIENT)
Dept: INTERNAL MEDICINE CLINIC | Facility: CLINIC | Age: 70
End: 2025-07-10

## 2025-07-10 ENCOUNTER — TELEPHONE (OUTPATIENT)
Dept: INTERNAL MEDICINE CLINIC | Facility: CLINIC | Age: 70
End: 2025-07-10

## 2025-07-10 ENCOUNTER — PATIENT OUTREACH (OUTPATIENT)
Dept: CASE MANAGEMENT | Age: 70
End: 2025-07-10

## 2025-07-10 NOTE — TELEPHONE ENCOUNTER
Future Appointments   Date Time Provider Department Center   8/7/2025  3:00 PM Helen Feliz MD [id number] [address]   Last office visit: 6/25/25  Saw Dr Vaca 6/6/25 - rec'd steroid injection and relief was temporary       CCM spoke with the patient today    The patient complains of worsening Left shoulder pain - taking     ACETAMINOPHEN-CODEINE 300-30 MG Oral Tab 60 tablet 0 12/9/2024     Tylenol 3 not helping only taking the edge off    The patient is asking if she can get a stronger pain medication

## 2025-07-10 NOTE — TELEPHONE ENCOUNTER
Action Requested: Summary for Provider     []  Critical Lab, Recommendations Needed  [x] Need Additional Advice  []   FYI    []   Need Orders  [] Need Medications Sent to Pharmacy  []  Other     KS: Please advise. Ty   SUMMARY: Spoke to patient who states she has been having some severe left shoulder arm pain due to arthritis. Pain started about one month ago. Pain is severe and patient is unable to move her left arm. Patient had Cortizone shot June 6th for her pain which did help but now pain is back.  She is on ACETAMINOPHEN-CODEINE 300 but it is not helping. Patient is requesting another pain medication or should she take more of the acetaminophen 300? Any recommendations?                Answer Assessment - Initial Assessment Questions  1. ONSET: \"When did the pain start?\"  Pain on and off left shoulder for 1 month       2. LOCATION: \"Where is the pain located?\"      Left shoulder and arm  3. PAIN: \"How bad is the pain?\"  (Scale 1-10; or mild, moderate, severe)    - MILD (1-3): doesn't interfere with normal activities    - MODERATE (4-7): interferes with normal activities (e.g., work or school) or awakens from sleep    - SEVERE (8-10): excruciating pain, unable to do any normal activities, unable to move arm at all due to pain      Patient states pain is severe unable to move her left arm.   4. WORK OR EXERCISE: \"Has there been any recent work or exercise that involved this part of the body?\"      No   5. CAUSE: \"What do you think is causing the shoulder pain?\"      Arthritis   6. OTHER SYMPTOMS: \"Do you have any other symptoms?\" (e.g., neck pain, swelling, rash, fever, numbness, weakness)      No   7. PREGNANCY: \"Is there any chance you are pregnant?\" \"When was your last menstrual period?\"      NO    Protocols used: Shoulder Pain-A-OH

## 2025-07-23 ENCOUNTER — OFFICE VISIT (OUTPATIENT)
Facility: LOCATION | Age: 70
End: 2025-07-23
Attending: INTERNAL MEDICINE
Payer: MEDICARE

## 2025-07-23 VITALS
RESPIRATION RATE: 18 BRPM | BODY MASS INDEX: 23.01 KG/M2 | SYSTOLIC BLOOD PRESSURE: 143 MMHG | HEIGHT: 57.01 IN | DIASTOLIC BLOOD PRESSURE: 65 MMHG | WEIGHT: 106.63 LBS | HEART RATE: 89 BPM | TEMPERATURE: 98 F | OXYGEN SATURATION: 98 %

## 2025-07-23 DIAGNOSIS — M81.0 AGE-RELATED OSTEOPOROSIS WITHOUT CURRENT PATHOLOGICAL FRACTURE: Primary | ICD-10-CM

## 2025-07-23 RX ORDER — ZOLEDRONIC ACID 0.05 MG/ML
5 INJECTION, SOLUTION INTRAVENOUS ONCE
Status: COMPLETED | OUTPATIENT
Start: 2025-07-23 | End: 2025-07-23

## 2025-07-23 RX ORDER — ZOLEDRONIC ACID 0.05 MG/ML
5 INJECTION, SOLUTION INTRAVENOUS ONCE
Status: CANCELLED | OUTPATIENT
Start: 2025-07-23

## 2025-07-23 RX ADMIN — ZOLEDRONIC ACID 5 MG: 0.05 INJECTION, SOLUTION INTRAVENOUS at 14:25:00

## 2025-07-23 NOTE — PROGRESS NOTES
Education Record    Learner:  Patient    Disease / Diagnosis: osteoporosis     Barriers / Limitations:  None   Comments:    Method:  Discussion   Comments:    General Topics:  Medication, Side effects and symptom management, Plan of care reviewed, and Fall risk and prevention   Comments:    Outcome:  Shows understanding   Comments:    Patient of  here for Reclast infusion. Tolerated well. Discharged ambulatory in stable condition.

## 2025-07-25 ENCOUNTER — APPOINTMENT (OUTPATIENT)
Dept: GENERAL RADIOLOGY | Facility: HOSPITAL | Age: 70
End: 2025-07-25
Attending: EMERGENCY MEDICINE

## 2025-07-25 ENCOUNTER — HOSPITAL ENCOUNTER (EMERGENCY)
Facility: HOSPITAL | Age: 70
Discharge: HOME | End: 2025-07-25
Attending: EMERGENCY MEDICINE

## 2025-07-25 VITALS
BODY MASS INDEX: 22.65 KG/M2 | SYSTOLIC BLOOD PRESSURE: 150 MMHG | OXYGEN SATURATION: 99 % | WEIGHT: 105 LBS | DIASTOLIC BLOOD PRESSURE: 70 MMHG | RESPIRATION RATE: 15 BRPM | TEMPERATURE: 98 F | HEART RATE: 89 BPM | HEIGHT: 57 IN

## 2025-07-25 DIAGNOSIS — M25.512 PAIN AND SWELLING OF LEFT SHOULDER: Primary | ICD-10-CM

## 2025-07-25 DIAGNOSIS — M25.412 PAIN AND SWELLING OF LEFT SHOULDER: Primary | ICD-10-CM

## 2025-07-25 PROCEDURE — 99283 EMERGENCY DEPT VISIT LOW MDM: CPT

## 2025-07-25 PROCEDURE — 96372 THER/PROPH/DIAG INJ SC/IM: CPT

## 2025-07-25 PROCEDURE — 73030 X-RAY EXAM OF SHOULDER: CPT | Performed by: EMERGENCY MEDICINE

## 2025-07-25 PROCEDURE — 99284 EMERGENCY DEPT VISIT MOD MDM: CPT

## 2025-07-25 RX ORDER — KETOROLAC TROMETHAMINE 30 MG/ML
30 INJECTION, SOLUTION INTRAMUSCULAR; INTRAVENOUS ONCE
Status: COMPLETED | OUTPATIENT
Start: 2025-07-25 | End: 2025-07-25

## 2025-07-25 RX ORDER — HYDROCODONE BITARTRATE AND ACETAMINOPHEN 5; 325 MG/1; MG/1
1 TABLET ORAL EVERY 6 HOURS PRN
Qty: 12 TABLET | Refills: 0 | Status: SHIPPED | OUTPATIENT
Start: 2025-07-25 | End: 2025-08-01

## 2025-07-25 RX ORDER — ACETAMINOPHEN 500 MG
1000 TABLET ORAL ONCE
Status: COMPLETED | OUTPATIENT
Start: 2025-07-25 | End: 2025-07-25

## 2025-07-25 NOTE — ED INITIAL ASSESSMENT (HPI)
Patient to ED with left shoulder pain, worsening over the past few days. Raulies jose manuel, states she has hx of shoulder issues, but now can't function at home due to pain

## 2025-07-25 NOTE — ED PROVIDER NOTES
Patient Seen in: [facility] Emergency Department        History  Chief Complaint   Patient presents with    Pain     Stated Complaint: pt states shoulder pain no falls    Subjective:   HPI            Patient has a history of bilateral shoulder issues, the right status post ramus many years ago and she has had a history of rotator cuff repair on the left.  Shoulder had been feeling pretty good with the last couple weeks it became worse.  She sees Dr. Cantu from [facility] states she had a cortisone injection on the fifth and typically things get better but this time she feels like its gotten worse.  Notes the shoulder is swollen.  Has pain with abduction.  No new falls, no fevers no chills.  Feels as if she may have torn another tendon.      Objective:     No pertinent past medical history.            No pertinent past surgical history.              No pertinent social history.                              Physical Exam    ED Triage Vitals [07/25/25 0457]   /72   Pulse 91   Resp 15   Temp 97.7 °F (36.5 °C)   Temp src Oral   SpO2 99 %   O2 Device None (Room air)       Current Vitals:   Vital Signs  BP: 150/70  Pulse: 89  Resp: 15  Temp: 97.7 °F (36.5 °C)  Temp src: Oral    Oxygen Therapy  SpO2: 99 %  O2 Device: None (Room air)            Physical Exam      Physical Exam   Constitutional: Awake, alert, well appearing  Head: Normocephalic and atraumatic.   Eyes: Conjunctivae are normal. Pupils are equal, round, and reactive to light.   Neck: Normal range of motion. No JVD  Cardiovascular: Normal rate, regular rhythm  Pulmonary/Chest: Normal effort.  No accessory muscle use.  No cyanosis.  Abdominal: Soft. Not distended.  Neurological: Pt is alert and oriented to person, place, and time. no cranial nerve deficits. Speech fluent    The left shoulder swollen compared to the right however there is no warmth erythema.  There is no pain with passive range of motion.  She does have pain with active range of motion  particularly when she tries to flex her bicep or abduct her shoulder.  She is able to internally actually rotate pretty comfortably.        ED Course  Labs Reviewed - No data to display         No results found.              Outside record from Dr. Vaca reviewed.  Last visit that I sees on 6/6/2025 and notes that the patient receives relief for about 2 months at a time after the injection.          MDM           Differential diagnoses considered: OA, rotator cuff tendinopathy or tear, possibly septic arthritis was considered but does not suggest clinically.    -Given exam and timing doubt infectious etiology.  However counseled to return for any pain with passive range of motion fevers chills or erythema.    -Continue range of motion, Tylenol, Norco for breakthrough pain.  - Recommend she follow-up with Dr. Vaca for reevaluation   I visualized the radiology studies, my independent interpretation: no displaced fracture noted on x-ray      *External charts reviewed:  as noted above  *Additional sources of history: n/a    Shared decision making was done by: patient, myself.          Medical Decision Making      Disposition and Plan     Clinical Impression:  1. Pain and swelling of left shoulder         Disposition:  Discharge  7/25/2025  5:58 am    Follow-up:  Jarad Vaca MD  [address]  [address]  [facility] [address]  [phone]    Follow up            Medications Prescribed:  Current Discharge Medication List        START taking these medications    Details   HYDROcodone-acetaminophen 5-325 MG Oral Tab Take 1 tablet by mouth every 6 (six) hours as needed.  Qty: 12 tablet, Refills: 0    Associated Diagnoses: Pain and swelling of left shoulder                   Supplementary Documentation:

## 2025-07-29 ENCOUNTER — TELEPHONE (OUTPATIENT)
Dept: INTERNAL MEDICINE CLINIC | Facility: CLINIC | Age: 70
End: 2025-07-29

## 2025-07-29 ENCOUNTER — PATIENT OUTREACH (OUTPATIENT)
Age: 70
End: 2025-07-29

## 2025-07-29 ENCOUNTER — PATIENT MESSAGE (OUTPATIENT)
Dept: INTERNAL MEDICINE CLINIC | Facility: CLINIC | Age: 70
End: 2025-07-29

## 2025-07-29 DIAGNOSIS — G47.9 SLEEP DISORDER: Primary | ICD-10-CM

## 2025-07-30 ENCOUNTER — PATIENT OUTREACH (OUTPATIENT)
Age: 70
End: 2025-07-30

## 2025-07-30 RX ORDER — HYDROXYZINE HYDROCHLORIDE 25 MG/1
25 TABLET, FILM COATED ORAL NIGHTLY PRN
Qty: 30 TABLET | Refills: 0 | Status: SHIPPED | OUTPATIENT
Start: 2025-07-30

## 2025-08-07 ENCOUNTER — OFFICE VISIT (OUTPATIENT)
Dept: RHEUMATOLOGY | Facility: CLINIC | Age: 70
End: 2025-08-07

## 2025-08-07 VITALS
BODY MASS INDEX: 22.87 KG/M2 | OXYGEN SATURATION: 97 % | TEMPERATURE: 99 F | HEART RATE: 93 BPM | RESPIRATION RATE: 16 BRPM | DIASTOLIC BLOOD PRESSURE: 70 MMHG | SYSTOLIC BLOOD PRESSURE: 138 MMHG | HEIGHT: 57 IN | WEIGHT: 106 LBS

## 2025-08-07 DIAGNOSIS — M19.012 ARTHRITIS OF LEFT GLENOHUMERAL JOINT: Primary | ICD-10-CM

## 2025-08-07 DIAGNOSIS — Z13.79 AFRICAN ANCESTRY REQUIRING POPULATION-SPECIFIC GENETIC SCREENING: ICD-10-CM

## 2025-08-07 DIAGNOSIS — M77.8 THUMB TENDONITIS: ICD-10-CM

## 2025-08-07 DIAGNOSIS — M1A.00X1 IDIOPATHIC CHRONIC GOUT WITH TOPHUS, UNSPECIFIED SITE: ICD-10-CM

## 2025-08-07 DIAGNOSIS — M1A.9XX1 TOPHUS OF LEFT HAND DUE TO GOUT: ICD-10-CM

## 2025-08-07 DIAGNOSIS — F11.90 OPIOID USE: ICD-10-CM

## 2025-08-07 DIAGNOSIS — M47.816 OSTEOARTHRITIS OF LUMBAR SPINE, UNSPECIFIED SPINAL OSTEOARTHRITIS COMPLICATION STATUS: ICD-10-CM

## 2025-08-07 PROCEDURE — 99214 OFFICE O/P EST MOD 30 MIN: CPT | Performed by: INTERNAL MEDICINE

## 2025-08-07 PROCEDURE — G2211 COMPLEX E/M VISIT ADD ON: HCPCS | Performed by: INTERNAL MEDICINE

## 2025-08-07 RX ORDER — HYDROCODONE BITARTRATE AND ACETAMINOPHEN 10; 325 MG/1; MG/1
TABLET ORAL EVERY 12 HOURS PRN
Qty: 60 TABLET | Refills: 0 | Status: SHIPPED | OUTPATIENT
Start: 2025-08-07 | End: 2025-09-06

## 2025-08-07 RX ORDER — COLCHICINE 0.6 MG/1
0.6 TABLET ORAL DAILY
Qty: 90 TABLET | Refills: 2 | Status: SHIPPED | OUTPATIENT
Start: 2025-08-07

## 2025-08-07 RX ORDER — FLUCONAZOLE 150 MG/1
150 TABLET ORAL ONCE
COMMUNITY
Start: 2025-06-25 | End: 2025-08-07

## 2025-08-07 RX ORDER — FEBUXOSTAT 40 MG/1
40 TABLET, FILM COATED ORAL DAILY
Qty: 90 TABLET | Refills: 1 | Status: SHIPPED | OUTPATIENT
Start: 2025-08-07

## 2025-08-15 ENCOUNTER — TELEPHONE (OUTPATIENT)
Dept: INTERNAL MEDICINE CLINIC | Facility: CLINIC | Age: 70
End: 2025-08-15

## 2025-08-25 RX ORDER — AMLODIPINE BESYLATE 2.5 MG/1
2.5 TABLET ORAL EVERY EVENING
Qty: 90 TABLET | Refills: 3 | Status: SHIPPED | OUTPATIENT
Start: 2025-08-25

## (undated) DEVICE — DRESSING AQUACEL AG 3.5 X 10

## (undated) DEVICE — DELTA XTEND METAGLENE CENTRAL GUIDE PIN DIAMETER 2,5 X 190 MM: Brand: DELTA XTEND

## (undated) DEVICE — Device: Brand: STABLECUT®

## (undated) DEVICE — SUTURE FIBERWIRE 2 AR-7202

## (undated) DEVICE — DRAPE,U/SHT,SPLIT,FILM,60X84,STERILE: Brand: MEDLINE

## (undated) DEVICE — KIT TRC TRIMANO BEACH CHR ARM

## (undated) DEVICE — GLENO GUIDE PIN D1.5 LG 300 MM

## (undated) DEVICE — SUTURE VICRYL 2-0 CP-1

## (undated) DEVICE — GLOVE SURG TRIUMPH SZ 8

## (undated) DEVICE — WRAP COOLING SHLDR W/ICE PILLO

## (undated) DEVICE — SUTURE VICRYL 0 CT-1

## (undated) DEVICE — ORTHO CDS-LF: Brand: MEDLINE INDUSTRIES, INC.

## (undated) DEVICE — KENDALL SCD EXPRESS SLEEVES, KNEE LENGTH, MEDIUM: Brand: KENDALL SCD

## (undated) DEVICE — 3M™ STERI-STRIP™ REINFORCED ADHESIVE SKIN CLOSURES, R1547, 1/2 IN X 4 IN (12 MM X 100 MM), 6 STRIPS/ENVELOPE: Brand: 3M™ STERI-STRIP™

## (undated) DEVICE — DELTA XTEND DRILL BIT DIAMETER 2,5 X 170 MM: Brand: DELTA XTEND

## (undated) DEVICE — 3M™ MICROFOAM™ TAPE 1528-4: Brand: 3M™ MICROFOAM™

## (undated) DEVICE — INTENDED TO AID IN THE PASSING OF SUTURES THROUGH BONE AND SOFT TISSUE DURING ORTHOPEDIC SURGERY: Brand: HOFFEE SUTURE RETRIEVER

## (undated) DEVICE — SUTURE MONOCRYL 3-0 PS-2

## (undated) DEVICE — GOWN,SIRUS,FABRIC-REINFORCED,X-LARGE: Brand: MEDLINE

## (undated) DEVICE — PROXIMATE SKIN STAPLERS (35 WIDE) CONTAINS 35 STAINLESS STEEL STAPLES (FIXED HEAD): Brand: PROXIMATE

## (undated) DEVICE — DELTA XTEND GLENOID CANNULATED STOP DRILL DIA 7.5MM: Brand: DELTA XTEND

## (undated) DEVICE — NEEDLE ANCHOR 1834-5-D

## (undated) DEVICE — #15 STERILE STAINLESS BLADE: Brand: STERILE STAINLESS BLADES

## (undated) DEVICE — SUTURE VICRYL 0 CP-1

## (undated) DEVICE — STERILE POLYISOPRENE POWDER-FREE SURGICAL GLOVES: Brand: PROTEXIS

## (undated) DEVICE — FAN SPRAY KIT: Brand: PULSAVAC®

## (undated) DEVICE — 1010 S-DRAPE TOWEL DRAPE 10/BX: Brand: STERI-DRAPE™

## (undated) DEVICE — SOL  .9 1000ML BTL

## (undated) DEVICE — GAMMEX® PI HYBRID SIZE 7.5, STERILE POWDER-FREE SURGICAL GLOVE, POLYISOPRENE AND NEOPRENE BLEND: Brand: GAMMEX

## (undated) DEVICE — DELTA XTEND SCREW GUIDE PIN 1.2: Brand: DELTA XTEND

## (undated) DEVICE — GAUZE SPONGES,12 PLY: Brand: CURITY

## (undated) NOTE — MR AVS SNAPSHOT
[place]  [address] [facility] [address]  [address] [facility] [id number] [phone]               Thank you for choosing us for your health care visit with Saul Tony MD.  We are glad to serve you and happy to provide you with this summ anyone to review and print using their home computer and printer. (the forms are also available in [facility])  [email or web address]  This link also has information from the [address] [place] [address] regarding Advance Directives.   Recommended Websites f TraZODone HCl 50 MG Tabs   Take 1 tablet (50 mg total) by mouth nightly.    Commonly known as:  DESYREL                Where to Get Your Medications      These medications were sent to [address] [place], [place] [address] [phone], 630 Diagnoses:  Undifferentiated connective tissue disease ([facility] [address] [address]   Osteoarthritis of spine with radiculopathy, lumbar region   Order:  Rheumatology - Internal    Vince Pacheco MD   [address]   [address] [address]   Phone:  [phone] authorization, such as [place], please feel free to schedule your appointment immediately. However, if you are unsure about the requirements for authorization, please wait 5-7 days and then contact your physician's office.  At that time, you will discharge instructions in [employer] by going to Visits < Admission Summaries. If you've been to the Emergency Department or your doctor's office, you can view your past visit information in [employer] by going to Visits < Visit Summaries. [employer] questions? ? Use a cane or walker (indoors and out) if you are unsteady on your feet.              Visit [facility] online at  [email or web address].tn

## (undated) NOTE — MR AVS SNAPSHOT
[place]  [address] [facility]  [address] [facility] [address] [phone]               Thank you for choosing us for your health care visit with Lima Trujillo MD.  We are glad to serve you and happy to provide you with this s Commonly known as:  PREMARIN           fluconazole 150 MG Tabs   Take 1 tablet (150 mg total) by mouth once.    Commonly known as:  DIFLUCAN           * Phenazopyridine HCl 200 MG Tabs   Take 1 tablet (200 mg total) by mouth 3 (three) times daily as needed You can access your MyChart to more actively manage your health care and view more details from this visit by going to [email or web address].   If you've recently had a stay at the Hospital you can access your discharge instructions in [address] by júnior

## (undated) NOTE — LETTER
Jonita Galeazzi Testing Department  Phone: [phone]  Right Fax: [phone]  [facility] [place] 20 Jevon Guidry Date: 19    Patient Name: Nilay Camp Nelson  Surgery Date: 2019    CSN: [id number]  Medical Record: [id number]   :

## (undated) NOTE — LETTER
Branden Robertson Testing Department  Phone: [phone]  Right Fax: [phone]  [facility] 20 Tirso Kinney RN Date: 5/15/19    Patient Name: David Silverio  Surgery Date: 5/30/2019    CSN: [id number]  Medical Record: [id number]

## (undated) NOTE — LETTER
07/01/21        [address] [place] [address] [facility] [address]      Dear Shira Sadler,    [address] [facility] records indicate that you have outstanding lab work and or testing that was ordered for you and has not yet been completed:  Orders Placed This Encou

## (undated) NOTE — MR AVS SNAPSHOT
[place]  [address] [facility]  [address] [facility] [address] [phone]               Thank you for choosing us for your health care visit with Carmen Madrid MD.  We are glad to serve you and happy to provide you with this summ You can access your MyChart to more actively manage your health care and view more details from this visit by going to [email or web address].   If you've recently had a stay at the Hospital you can access your discharge instructions in Dagoberto Colbert by újnior

## (undated) NOTE — MR AVS SNAPSHOT
[address]  [address] [place] [address], [address] [place] [address] 95 [phone]               Thank you for choosing us for your health care visit with Ca Mccain MD.  We are glad to serve you and happy to provide you with this ? Written prescriptions must be picked up in office. ? Please allow the office 48-72 hours to fill the prescription. ? Patient must present photo ID at time of .       Scheduling Tests    If your physician has ordered radiology tests such as MRI o RESTASIS 0.05 % Emul   Generic drug:  cycloSPORINE   Place 1 drop into both eyes every 12 (twelve) hours. TraMADol HCl 50 MG Tabs   Take 1 tablet (50 mg total) by mouth every 8 (eight) hours as needed for Pain.    Commonly known as:  Ceci Daley

## (undated) NOTE — IP AVS SNAPSHOT
[address]            (For Outpatient Use Only) Initial Admit Date: 5/30/2019   Inpt/Obs Admit Date: Inpt: 5/30/19 / Obs: N/A   Discharge Date:    Link Pattee:  [de-identified]   MRN: [de-identified]   CSN: [id number]   CEID: [id number] Subscriber Name:  Tony Ericka :    Subscriber ID:  Pt Rel to Subscriber:    Hospital Account Financial Class: Medicare Advantage    2019

## (undated) NOTE — Clinical Note
Tad Keys is going to see you. If you have labs to order can you please place them and she will get them all done together. Finally she has been adherent with daily HCQ but not with prednisone as it causes palpitations.     Thanks so much,  Aneudy Quintero

## (undated) NOTE — MR AVS SNAPSHOT
[place]  [facility]  [address] [facility] [address] [phone]               Thank you for choosing us for your health care visit with Boy Sewell MD.  We are glad to serve you and happy to provide you with this summ Diagnoses:  Urinary frequency   Degenerative disc disease, cervical   Osteoarthritis of spine with radiculopathy, lumbar region   Spinal stenosis in cervical region   Other chronic pain   Insomnia, unspecified type   S/P cervical spinal fusion   Order:  N Assoc Dx:  Urinary frequency [R35.0], Degenerative disc disease, cervical [M50.30], Osteoarthritis of spine with radiculopathy, lumbar region [M47.26], Spinal stenosis in cervical region [M48.02], Other chronic pain [G89.29], Insomnia, unspecified type [G schedule your appointment. Failure to obtain required authorization numbers can create reimbursement difficulties for you.     Assoc Dx:  Right foot pain [M79.671]          Reason for Today's Visit     UTI           Medical Issues Discussed Today     Chroni APPEARANCE clear Clear    URINE-COLOR pale yellow Yellow    Multistix Lot# [id number] Numeric    Multistix Expiration Date 7/18 Date                  MyChart     Visit MyChart  You can access your MyChart to more actively manage your health care and view more

## (undated) NOTE — LETTER
04/17/18        [place] [address]      Dear Rody Longoria,    [address] [facility] records indicate that you have outstanding lab work and or testing that was ordered for you and has not yet been completed:          Lipid Panel  To provi

## (undated) NOTE — IP AVS SNAPSHOT
Patient Demographics     Address  [address] [facility] [address] Phone  [phone] [facility])  [phone] (Mobile) *Preferred* E-mail Address  [email or web address]      Emergency Contact(s)     Name Relation Home Work Mobile    Grady Huntley ? Arm in sling with hand level with elbow, along with the wrist and hand inside the sling. ? DO NOT rotate your arm out to the side.   ? you may use a pillow under the elbow while sitting in a chair for comfort and support, no weight or leaning on surgical ?  Pat dry if necessary and cover incision with dry sterile gauze and paper tape. For other types of dressings, follow surgeon’s orders.                                                                ? There could be a small amount of redness around the sta this at first post-op office visit. ? Schedule outpatient physical therapy per your surgeon’s orders     Sex  ? Check with physician at your office visit. Notify your surgeon if you notice any of the following signs  ? Separation of incision line.   ? visit in 2 weeks. Specialties:  SURGERY, ORTHOPEDIC, HAND SURGERY  Contact information:  [address] [place] [address]             Poly Rangel MD In 2 weeks.     Specialties:  SURGERY, ORTHOPEDIC, HAND SURGERY  Contact i Next dose due: Tomorrow morning 6/2      Take 1 capsule by mouth daily.                 Where to Get Your Medications      Please  your prescriptions at the location directed by your doctor or nurse    Bring a paper prescription for each of these me Patient Weight  48.1 kg (106 lb 2.4 oz)         Lab Results Last 24 Hours      CBC, PLATELET; NO DIFFERENTIAL [[id number]] (Abnormal)  Resulted: 06/01/19 0517, Result status: Final result   Ordering provider:  Poly Rangel MD  05/31/19 9914 Resulting la Follow - Up: f/u right shoulder         HPI: Jami Laboy is here today with more questions regarding her right shoulder.   She is questioning why it has to be a reverse.     ALLERGIES:     Fentanyl                NAUSEA AND VOMITING  Morphine                NA   Performed by Eber Grady MD at [facility] MAIN OR   • CERVICAL FACET INJECTION Right 12/1/2016     Performed by Eber Grady MD at [facility] MAIN OR   • COLONOSCOPY   2010   • HYSTERECTOMY   1985     RUKHSANA and BSO, fibroids, 1987   • OTHER SURGICAL HISTORY   2 SKIN: no rashes,no suspicious lesions  MOUTH, NOSE: nasal mucosa pink w/o discharge. Oropharynx is pink with no exudate or erythema. No masses or leukoplakia   RESPIRATORY: normal breath sounds bilaterally.  No crackles, rubs or wheezing   CARDIOVASCULAR: R Hosp Day # 0 PCP Marimar Castano MD     Reason for consult:[MD.1] connective tissue disease[MD.2]   Requested by:[MD.1] Dr. Paredes [facility]    History of Present Illness: Toni Olivo is a 59year old female with[MD.1] total shoulder replacement.   Patient sta • SURGERY - EXTERNAL  September 2007    C-spine surgery for Intraspinal tumor   • TRANSFORAMINAL LUMBAR EPIDURAL STEROID INJECTION MULTIPLE LEVEL Left 2/9/2017    Performed by Janki Suazo MD at [facility] MAIN OR   • TRANSFORAMINAL LUMBAR EPIDURAL STEROID IN lidocaine 5 % External Patch Place 1 patch onto the skin See Admin Instructions. On for 12 hours and off for 12 hours Disp: 90 patch Rfl: 1   RESTASIS 0.05 % Ophthalmic Emulsion Place 1 drop into both eyes every 12 (twelve) hours.    Disp:  Rfl: 6       Phvirgilio Electronically signed by Chrystal Oppenheim, MD on 5/30/2019 11:14 PM   Attribution Owens    MD.1 - Chrystal Oppenheim, MD on 5/30/2019 10:03 PM  MD.2 - Chrystal Oppenheim, MD on 5/30/2019 11:13 PM                     D/C Summary    No notes of this type exist for this Performed by Cyndee Cyr MD at [address]   • CERVICAL FACET INJECTION Right 12/1/2016    Performed by Cyndee Cyr MD at [facility] MAIN OR   • COLONOSCOPY  2010   • HYSTERECTOMY  1985    RUKHSANA and BSO, fibroids, 1987   • OTHER SURGICAL HISTORY  2006 ACTIVITY TOLERANCE                         O2 WALK                  AM-PAC '6-Clicks' INPATIENT SHORT FORM - BASIC MOBILITY  How much difficulty does the patient currently have. .. [KT.1]  -   Turning over in bed (including adjusting bedclothes, sheets and b outcome measures completed include AM-PAC scores. The AM-PAC '6-Clicks' Inpatient Basic Mobility Short Form was completed and this patient is demonstrating a 41.77% degree of impairment in mobility.  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     Problem List  Active Problems:    Mixed connective tissue disease ([facility] [address]    Nicole Hale Performed by Franchesca Hernandez MD at [address]   • TRANSFORAMINAL LUMBAR EPIDURAL STEROID INJECTION MULTIPLE LEVEL Left 2/2/2017    Performed by Franchesca Hernandez MD at [address] [facility] SITUATION  Type of Home: [facility] -   Putting on and taking off regular lower body clothing?: A Little   -   Bathing (including washing, rinsing, drying)?: A Little  -   Toileting, which includes using toilet, bedpan or urinal? : A Little  -   Putting on and taking off regular upper body c of session/findings; All patient questions and concerns addressed;SCDs in place; Ice applied    ASSESSMENT     Patient is a[LD.3 59year old[LD.2] female admitted 5/30/2019 for R TSA. Patient seen this AM for OT evauation.  In this OT eval patient performed LD.1 - Jennifer Ram on 5/31/2019  1:33 PM  LD. 2 - Jennifer Ram on 5/31/2019  1:46 PM                     Video Swallow Study Notes    No notes of this type exist for this encounter. SLP Notes    No notes of this type exist for this encounter.      Imm

## (undated) NOTE — LETTER
AUTHORIZATION FOR SURGICAL OPERATION OR OTHER PROCEDURE    1.  I hereby [facility]. John Carter, [facility] assigned to my case to perform the following operation and/or procedure at the [facility]:    ________________________________ Time:  ________ A. M.  P.M.        Patient Name:  ______________________________________________________  (please print)      Patient signature:  ___________________________________________________             Relationship to Patient:

## (undated) NOTE — LETTER
06/12/19        [place] [address]      Dear David Quinonez,    [address] [facility] records indicate that you have outstanding lab work and or testing that was ordered for you and has not yet been completed: Mammogram - Please contac

## (undated) NOTE — ED AVS SNAPSHOT
[facility] in [address]    [address] [place] [address]    Phone:  [phone]    Fax:  [phone]           Ling Catalan   MRN: [id number]    Department:  THE [facility] in [address]   Date of Visit:  4/23/2017 Return to the emergency department if worse or any concerns    Continue all of your home medications    Take Bactrim 1 pill twice a day for the next 3 days. Take Pyridium 200 mg every 8 hours as needed for painful urination.     While taking antibiotics it receive this, we would really appreciate it if you could take the time to complete it. Thank you! You were examined and treated today on an urgent basis only. This was not a substitute for ongoing medical care.  Often, one Immediate Care visit does no 4[address]  [facility] ([address]) [facility] [address] ([address]) [address]  [facility] ([address]) 21  [address] ([address]) 101